# Patient Record
Sex: MALE | Race: WHITE | Employment: OTHER | URBAN - METROPOLITAN AREA
[De-identification: names, ages, dates, MRNs, and addresses within clinical notes are randomized per-mention and may not be internally consistent; named-entity substitution may affect disease eponyms.]

---

## 2017-01-17 ENCOUNTER — OFFICE VISIT (OUTPATIENT)
Dept: FAMILY MEDICINE CLINIC | Facility: CLINIC | Age: 72
End: 2017-01-17

## 2017-01-17 VITALS — SYSTOLIC BLOOD PRESSURE: 108 MMHG | DIASTOLIC BLOOD PRESSURE: 78 MMHG | WEIGHT: 166 LBS | BODY MASS INDEX: 24 KG/M2

## 2017-01-17 DIAGNOSIS — R19.4 CHANGE IN BOWEL HABIT: Primary | ICD-10-CM

## 2017-01-17 PROCEDURE — 99214 OFFICE O/P EST MOD 30 MIN: CPT | Performed by: SURGERY

## 2017-01-17 NOTE — PROGRESS NOTES
Sean Castro is a 73year old male  Patient presents with:  Consult: Dr Liz Doss. Consult for colonoscopy. Last colonoscopy 5 yrs ago at [facility] in [place].        REFERRED BY    Patient presents with screening colon   Last colonoscopy four years ago benito extremities  HEMATOLOGY: denies hx anemia; denies bruising or excessive bleeding  Endocrine: no weight gain or loss no hot or cold intolerance    EXAM     Blood pressure 108/78, weight 166 lb.   GENERAL: well developed, well nourished male, in no apparent d

## 2017-01-18 ENCOUNTER — TELEPHONE (OUTPATIENT)
Dept: FAMILY MEDICINE CLINIC | Facility: CLINIC | Age: 72
End: 2017-01-18

## 2017-01-18 ENCOUNTER — MED REC SCAN ONLY (OUTPATIENT)
Dept: FAMILY MEDICINE CLINIC | Facility: CLINIC | Age: 72
End: 2017-01-18

## 2017-03-21 ENCOUNTER — PATIENT OUTREACH (OUTPATIENT)
Dept: FAMILY MEDICINE CLINIC | Facility: CLINIC | Age: 72
End: 2017-03-21

## 2017-12-15 ENCOUNTER — OFFICE VISIT (OUTPATIENT)
Dept: FAMILY MEDICINE CLINIC | Facility: CLINIC | Age: 72
End: 2017-12-15

## 2017-12-15 VITALS
DIASTOLIC BLOOD PRESSURE: 80 MMHG | WEIGHT: 166 LBS | RESPIRATION RATE: 14 BRPM | BODY MASS INDEX: 23.24 KG/M2 | HEART RATE: 80 BPM | TEMPERATURE: 98 F | HEIGHT: 71 IN | SYSTOLIC BLOOD PRESSURE: 114 MMHG

## 2017-12-15 DIAGNOSIS — Z72.0 TOBACCO USE: ICD-10-CM

## 2017-12-15 DIAGNOSIS — R01.1 SYSTOLIC MURMUR: ICD-10-CM

## 2017-12-15 DIAGNOSIS — Z00.00 MEDICARE ANNUAL WELLNESS VISIT, SUBSEQUENT: Primary | ICD-10-CM

## 2017-12-15 DIAGNOSIS — Z12.5 ENCOUNTER FOR PROSTATE CANCER SCREENING: ICD-10-CM

## 2017-12-15 DIAGNOSIS — K80.20 ASYMPTOMATIC GALLSTONES: ICD-10-CM

## 2017-12-15 PROCEDURE — 84443 ASSAY THYROID STIM HORMONE: CPT | Performed by: FAMILY MEDICINE

## 2017-12-15 PROCEDURE — 90670 PCV13 VACCINE IM: CPT | Performed by: FAMILY MEDICINE

## 2017-12-15 PROCEDURE — 80061 LIPID PANEL: CPT | Performed by: FAMILY MEDICINE

## 2017-12-15 PROCEDURE — G0439 PPPS, SUBSEQ VISIT: HCPCS | Performed by: FAMILY MEDICINE

## 2017-12-15 PROCEDURE — 85027 COMPLETE CBC AUTOMATED: CPT | Performed by: FAMILY MEDICINE

## 2017-12-15 PROCEDURE — 80053 COMPREHEN METABOLIC PANEL: CPT | Performed by: FAMILY MEDICINE

## 2017-12-15 PROCEDURE — G0009 ADMIN PNEUMOCOCCAL VACCINE: HCPCS | Performed by: FAMILY MEDICINE

## 2017-12-15 NOTE — PROGRESS NOTES
Ana Jane is a 81year old male.     CC:  Patient presents with:  Wellness Visit: Medicare Wellness visit per pt      HPI:  Yearly PX  Last lipid: 12/16    Immunization History  Administered            Date(s) Administered    HIGH DOSE FLU 65 YRS and he does have a pulmonary nodule that is followed by Dr. Gisella Pederson, serial CT chests have shown stability  GI: Denies abdominal pain, dysphagia, hematochezia, melena, he has a gallstone and notes no abd pain with food intake   (male): Denies hematuria    V eating, especially fatty foods      Orders for this visit:    Orders Placed This Encounter      Comp Metabolic Panel (14) [E]      CBC, Platelet, No Differential [E]      TSH W Reflex To Free T4 [E]      Lipid Panel [E]      PSA (Screening) [E]      Pneumo

## 2017-12-16 ENCOUNTER — TELEPHONE (OUTPATIENT)
Dept: FAMILY MEDICINE CLINIC | Facility: CLINIC | Age: 72
End: 2017-12-16

## 2017-12-16 NOTE — TELEPHONE ENCOUNTER
----- Message from aDyron Sloan MD sent at 12/16/2017 10:33 AM CST -----  Please let patient know that the sugar, electrolyte, liver, kidney, and thyroid, tests were fine. There is no anemia and the white blood cell count is fine.  Overall a very nice set

## 2017-12-19 NOTE — TELEPHONE ENCOUNTER
Patients wife was in office, Domo Lerma printed off labs and gave these to patient. She states no need to call, if questions they will call us back.

## 2018-01-03 ENCOUNTER — HOSPITAL ENCOUNTER (OUTPATIENT)
Dept: CV DIAGNOSTICS | Age: 73
Discharge: HOME | End: 2018-01-03
Attending: FAMILY MEDICINE
Payer: MEDICARE

## 2018-01-03 DIAGNOSIS — R01.1 SYSTOLIC MURMUR: ICD-10-CM

## 2018-01-03 PROCEDURE — 93306 TTE W/DOPPLER COMPLETE: CPT | Performed by: FAMILY MEDICINE

## 2018-01-04 ENCOUNTER — TELEPHONE (OUTPATIENT)
Dept: FAMILY MEDICINE CLINIC | Facility: CLINIC | Age: 73
End: 2018-01-04

## 2018-01-04 DIAGNOSIS — I51.7 CARDIAC HYPERTROPHY: ICD-10-CM

## 2018-01-04 DIAGNOSIS — R01.1 MURMUR, HEART: Primary | ICD-10-CM

## 2018-01-04 NOTE — TELEPHONE ENCOUNTER
Hasn't had such a good experience with [facility]. Was told that she would get a call on Monday as to which MDs were taking new pts. Is there another physician that [facility] would recommend?

## 2018-01-10 ENCOUNTER — CHARTING TRANS (OUTPATIENT)
Dept: OTHER | Age: 73
End: 2018-01-10

## 2018-01-11 ENCOUNTER — CHARTING TRANS (OUTPATIENT)
Dept: OTHER | Age: 73
End: 2018-01-11

## 2018-01-11 ENCOUNTER — TELEPHONE (OUTPATIENT)
Dept: FAMILY MEDICINE CLINIC | Facility: CLINIC | Age: 73
End: 2018-01-11

## 2018-01-11 NOTE — TELEPHONE ENCOUNTER
FAXED A COPY OF MOST RECENT LAB RESULTS FROM 12/15/2017 TO [facility], FAX# [phone] ATTN: Brook Traylor. PATIENT IS CURRENTLY AT THEIR OFFICE FOR AN APPOINTMENT.

## 2018-01-12 ENCOUNTER — CHARTING TRANS (OUTPATIENT)
Dept: OTHER | Age: 73
End: 2018-01-12

## 2018-01-12 ENCOUNTER — MED REC SCAN ONLY (OUTPATIENT)
Dept: FAMILY MEDICINE CLINIC | Facility: CLINIC | Age: 73
End: 2018-01-12

## 2018-01-16 ENCOUNTER — IMAGING SERVICES (OUTPATIENT)
Dept: OTHER | Age: 73
End: 2018-01-16

## 2018-01-16 ENCOUNTER — CHARTING TRANS (OUTPATIENT)
Dept: OTHER | Age: 73
End: 2018-01-16

## 2018-01-17 ENCOUNTER — CHARTING TRANS (OUTPATIENT)
Dept: OTHER | Age: 73
End: 2018-01-17

## 2018-01-18 ENCOUNTER — IMAGING SERVICES (OUTPATIENT)
Dept: OTHER | Age: 73
End: 2018-01-18

## 2018-01-18 ENCOUNTER — CHARTING TRANS (OUTPATIENT)
Dept: OTHER | Age: 73
End: 2018-01-18

## 2018-01-18 ENCOUNTER — ANCILLARY ORDERS (OUTPATIENT)
Dept: OTHER | Age: 73
End: 2018-01-18

## 2018-01-18 DIAGNOSIS — I71.40 ABDOMINAL AORTIC ANEURYSM WITHOUT RUPTURE (CMD): ICD-10-CM

## 2018-01-25 ENCOUNTER — CHARTING TRANS (OUTPATIENT)
Dept: OTHER | Age: 73
End: 2018-01-25

## 2018-02-12 ENCOUNTER — CHARTING TRANS (OUTPATIENT)
Dept: OTHER | Age: 73
End: 2018-02-12

## 2018-02-13 ENCOUNTER — CHARTING TRANS (OUTPATIENT)
Dept: OTHER | Age: 73
End: 2018-02-13

## 2018-02-13 ENCOUNTER — IMAGING SERVICES (OUTPATIENT)
Dept: OTHER | Age: 73
End: 2018-02-13

## 2018-02-14 ENCOUNTER — CHARTING TRANS (OUTPATIENT)
Dept: OTHER | Age: 73
End: 2018-02-14

## 2018-03-05 ENCOUNTER — CHARTING TRANS (OUTPATIENT)
Dept: OTHER | Age: 73
End: 2018-03-05

## 2018-03-06 ENCOUNTER — CHARTING TRANS (OUTPATIENT)
Dept: OTHER | Age: 73
End: 2018-03-06

## 2018-03-06 ENCOUNTER — IMAGING SERVICES (OUTPATIENT)
Dept: OTHER | Age: 73
End: 2018-03-06

## 2018-03-07 ENCOUNTER — CHARTING TRANS (OUTPATIENT)
Dept: OTHER | Age: 73
End: 2018-03-07

## 2018-03-10 ENCOUNTER — LAB SERVICES (OUTPATIENT)
Dept: OTHER | Age: 73
End: 2018-03-10

## 2018-03-10 LAB
BUN SERPL-MCNC: 13 MG/DL (ref 6–27)
CALCIUM SERPL-MCNC: 9.7 MG/DL (ref 8.6–10.6)
CHLORIDE SERPL-SCNC: 105 MMOL/L (ref 96–107)
CREATININE, SERUM: 1 MG/DL (ref 0.6–1.6)
DIFFERENTIAL TYPE: ABNORMAL
GFR SERPL CREATININE-BSD FRML MDRD: >60 ML/MIN/{1.73M2}
GFR SERPL CREATININE-BSD FRML MDRD: >60 ML/MIN/{1.73M2}
GLUCOSE SERPL-MCNC: 89 MG/DL (ref 70–200)
HCO3 SERPL-SCNC: 28 MMOL/L (ref 22–32)
HEMATOCRIT: 45.8 % (ref 40–51)
HEMOGLOBIN: 15.9 G/DL (ref 13.7–17.5)
INTERNATIONAL NORMALIZED RATIO: 1
LYMPH PERCENT: 33.9 % (ref 20.5–51.1)
LYMPHOCYTE ABSOLUTE #: 2.8 10*3/UL (ref 1.2–3.4)
MAGNESIUM SERPL-MCNC: 2.1 MG/DL (ref 1.6–2.6)
MEAN CORPUSCULAR HGB CONCENTRATION: 34.7 % (ref 32–36)
MEAN CORPUSCULAR HGB: 32.9 PG (ref 27–34)
MEAN CORPUSCULAR VOLUME: 94.6 FL (ref 79–95)
MEAN PLATELET VOLUME: 8.5 FL (ref 8.6–12.4)
MIXED %: 6.1 % (ref 4.3–12.9)
MIXED ABSOLUTE #: 0.5 10*3/UL (ref 0.2–0.9)
NEUTROPHIL ABSOLUTE #: 5 10*3/UL (ref 1.4–6.5)
NEUTROPHIL PERCENT: 60 % (ref 34–73.5)
PLATELET COUNT: 254 10*3/UL (ref 150–400)
POTASSIUM SERPL-SCNC: 4.7 MMOL/L (ref 3.5–5.3)
PROTHROMBIN TIME: 10.5 S (ref 9.5–11.5)
RED BLOOD CELL COUNT: 4.84 10*6/UL (ref 3.9–5.7)
RED CELL DISTRIBUTION WIDTH: 12.8 % (ref 11.3–14.8)
SODIUM SERPL-SCNC: 143 MMOL/L (ref 136–146)
WHITE BLOOD CELL COUNT: 8.3 10*3/UL (ref 4–10)

## 2018-03-12 ENCOUNTER — CHARTING TRANS (OUTPATIENT)
Dept: OTHER | Age: 73
End: 2018-03-12

## 2018-03-16 ENCOUNTER — IMAGING SERVICES (OUTPATIENT)
Dept: OTHER | Age: 73
End: 2018-03-16

## 2018-03-19 ENCOUNTER — MED REC SCAN ONLY (OUTPATIENT)
Dept: FAMILY MEDICINE CLINIC | Facility: CLINIC | Age: 73
End: 2018-03-19

## 2018-03-19 ENCOUNTER — LAB SERVICES (OUTPATIENT)
Dept: OTHER | Age: 73
End: 2018-03-19

## 2018-03-20 LAB
MRSA SCREEN, PCR: NEGATIVE
MRSA SOURCE: NORMAL

## 2018-03-21 ENCOUNTER — CHARTING TRANS (OUTPATIENT)
Dept: OTHER | Age: 73
End: 2018-03-21

## 2018-04-08 ENCOUNTER — CHARTING TRANS (OUTPATIENT)
Dept: OTHER | Age: 73
End: 2018-04-08

## 2018-04-10 ENCOUNTER — IMAGING SERVICES (OUTPATIENT)
Dept: OTHER | Age: 73
End: 2018-04-10

## 2018-04-10 ENCOUNTER — CHARTING TRANS (OUTPATIENT)
Dept: OTHER | Age: 73
End: 2018-04-10

## 2018-04-10 ENCOUNTER — TELEPHONE (OUTPATIENT)
Dept: FAMILY MEDICINE CLINIC | Facility: CLINIC | Age: 73
End: 2018-04-10

## 2018-04-10 NOTE — TELEPHONE ENCOUNTER
Pt had AF Ablation and  aterial flutter cauterized at [facility] on 3-19. Did TJ get info on this and if not can he get it from [facility].

## 2018-04-10 NOTE — TELEPHONE ENCOUNTER
Patient wife aware Dr Anita Mcduffie out of the office this evening and will check with him in the morning. Routed to Dr Anita Mcduffie to advise if records have been received.

## 2018-04-11 ENCOUNTER — CHARTING TRANS (OUTPATIENT)
Dept: OTHER | Age: 73
End: 2018-04-11

## 2018-04-11 RX ORDER — FLECAINIDE ACETATE 50 MG/1
50 TABLET ORAL 2 TIMES DAILY
Refills: 0 | COMMUNITY
Start: 2018-04-11 | End: 2019-09-20

## 2018-04-11 RX ORDER — METOPROLOL TARTRATE 50 MG/1
50 TABLET, FILM COATED ORAL 2 TIMES DAILY
Refills: 0 | COMMUNITY
Start: 2018-04-11 | End: 2018-12-10

## 2018-04-11 NOTE — TELEPHONE ENCOUNTER
Wife states that patient is on  Flecainide 50mg one po BID  Metoprolol 50mg one po daily   Medications entered into medication list  Wife is going to come into the office and sign a release of records.

## 2018-04-11 NOTE — TELEPHONE ENCOUNTER
Let patient know that no info regarding the procedure has been forwarded. Please have him sign a JEN consent so we can obtain the info.   Thanks

## 2018-04-11 NOTE — TELEPHONE ENCOUNTER
Let patient know that we have the consult letter from his Cardiologist.   Please find out doses of Flecainide and Metoprolol and enter into the medication list.  Thanks

## 2018-05-21 ENCOUNTER — CHARTING TRANS (OUTPATIENT)
Dept: OTHER | Age: 73
End: 2018-05-21

## 2018-05-21 ENCOUNTER — IMAGING SERVICES (OUTPATIENT)
Dept: OTHER | Age: 73
End: 2018-05-21

## 2018-05-23 ENCOUNTER — CHARTING TRANS (OUTPATIENT)
Dept: OTHER | Age: 73
End: 2018-05-23

## 2018-05-25 ENCOUNTER — CHARTING TRANS (OUTPATIENT)
Dept: OTHER | Age: 73
End: 2018-05-25

## 2018-06-09 ENCOUNTER — CHARTING TRANS (OUTPATIENT)
Dept: OTHER | Age: 73
End: 2018-06-09

## 2018-06-12 ENCOUNTER — IMAGING SERVICES (OUTPATIENT)
Dept: OTHER | Age: 73
End: 2018-06-12

## 2018-06-12 ENCOUNTER — CHARTING TRANS (OUTPATIENT)
Dept: OTHER | Age: 73
End: 2018-06-12

## 2018-06-27 ENCOUNTER — PATIENT OUTREACH (OUTPATIENT)
Dept: FAMILY MEDICINE CLINIC | Facility: CLINIC | Age: 73
End: 2018-06-27

## 2018-07-13 ENCOUNTER — CHARTING TRANS (OUTPATIENT)
Dept: OTHER | Age: 73
End: 2018-07-13

## 2018-08-11 ENCOUNTER — CHARTING TRANS (OUTPATIENT)
Dept: OTHER | Age: 73
End: 2018-08-11

## 2018-08-14 ENCOUNTER — IMAGING SERVICES (OUTPATIENT)
Dept: OTHER | Age: 73
End: 2018-08-14

## 2018-08-14 ENCOUNTER — CHARTING TRANS (OUTPATIENT)
Dept: OTHER | Age: 73
End: 2018-08-14

## 2018-11-21 ENCOUNTER — CHARTING TRANS (OUTPATIENT)
Dept: OTHER | Age: 73
End: 2018-11-21

## 2018-11-21 ENCOUNTER — LAB SERVICES (OUTPATIENT)
Dept: OTHER | Age: 73
End: 2018-11-21

## 2018-11-23 ENCOUNTER — IMAGING SERVICES (OUTPATIENT)
Dept: OTHER | Age: 73
End: 2018-11-23

## 2018-11-29 LAB — PATH REPORT: NORMAL

## 2018-12-04 ENCOUNTER — TELEPHONE (OUTPATIENT)
Dept: DERMATOLOGY | Age: 73
End: 2018-12-04

## 2018-12-04 DIAGNOSIS — C44.310 BCC (BASAL CELL CARCINOMA), FACE: Primary | ICD-10-CM

## 2018-12-10 ENCOUNTER — TELEPHONE (OUTPATIENT)
Dept: OTOLARYNGOLOGY | Age: 73
End: 2018-12-10

## 2018-12-10 ENCOUNTER — TELEPHONE (OUTPATIENT)
Dept: FAMILY MEDICINE CLINIC | Facility: CLINIC | Age: 73
End: 2018-12-10

## 2018-12-10 PROBLEM — I48.3 TYPICAL ATRIAL FLUTTER (HCC): Status: ACTIVE | Noted: 2018-03-19

## 2018-12-10 PROBLEM — I71.21 ASCENDING AORTIC ANEURYSM: Status: ACTIVE | Noted: 2018-12-10

## 2018-12-10 PROBLEM — I71.21 ASCENDING AORTIC ANEURYSM (HCC): Status: ACTIVE | Noted: 2018-12-10

## 2018-12-10 PROBLEM — I42.1 HYPERTROPHIC OBSTRUCTIVE CARDIOMYOPATHY (HCC): Status: ACTIVE | Noted: 2018-01-11

## 2018-12-10 PROBLEM — I71.2 ASCENDING AORTIC ANEURYSM (HCC): Status: ACTIVE | Noted: 2018-12-10

## 2018-12-10 PROBLEM — I48.0 PAF (PAROXYSMAL ATRIAL FIBRILLATION) (HCC): Status: ACTIVE | Noted: 2018-12-10

## 2018-12-10 PROBLEM — Z72.0 TOBACCO ABUSE: Status: ACTIVE | Noted: 2017-12-15

## 2018-12-10 RX ORDER — METOPROLOL SUCCINATE 25 MG/1
25 TABLET, EXTENDED RELEASE ORAL DAILY
COMMUNITY
Start: 2018-07-13

## 2018-12-10 NOTE — TELEPHONE ENCOUNTER
I also recommend that ENT doctors remove cancers from the face. I do not know the ENT department at [facility]. I typically use [facility], Dr. Los Collado.  He is also a Facial Plastics specialist   Tel: [phone]   I am not sure if he

## 2018-12-10 NOTE — TELEPHONE ENCOUNTER
Shirly Rinne states that patient was seen by Dr Elsy Arthur with [facility] and had a biopsy taken of lesion by his temple on 11/21/18. Was diagnosed with Basal cell carcinoma.   States Dr. Elsy Arthur would not remove lesion due to the location and advised to see ENT within

## 2018-12-15 ENCOUNTER — EXTERNAL RECORD (OUTPATIENT)
Dept: OTHER | Age: 73
End: 2018-12-15

## 2018-12-15 ENCOUNTER — EXTERNAL RECORD (OUTPATIENT)
Dept: CARDIOLOGY | Age: 73
End: 2018-12-15

## 2018-12-17 ENCOUNTER — TELEPHONE (OUTPATIENT)
Dept: FAMILY MEDICINE CLINIC | Facility: CLINIC | Age: 73
End: 2018-12-17

## 2018-12-17 NOTE — TELEPHONE ENCOUNTER
Faxed request to [facility]    Future Appointments   Date Time Provider Jennifer Barker   12/19/2018 11:15 AM Alejandra Yates MD [facility] Maxime Mckeon

## 2018-12-18 ENCOUNTER — MED REC SCAN ONLY (OUTPATIENT)
Dept: FAMILY MEDICINE CLINIC | Facility: CLINIC | Age: 73
End: 2018-12-18

## 2018-12-18 ENCOUNTER — PATIENT OUTREACH (OUTPATIENT)
Dept: CASE MANAGEMENT | Age: 73
End: 2018-12-18

## 2018-12-18 DIAGNOSIS — Z02.9 ENCOUNTERS FOR UNSPECIFIED ADMINISTRATIVE PURPOSE: ICD-10-CM

## 2018-12-18 DIAGNOSIS — I48.0 PAF (PAROXYSMAL ATRIAL FIBRILLATION) (HCC): ICD-10-CM

## 2018-12-18 PROCEDURE — 1111F DSCHRG MED/CURRENT MED MERGE: CPT

## 2018-12-18 NOTE — PROGRESS NOTES
Initial Post Discharge Follow Up   Discharge Date:  12/16/18 from [facility] Date: 12/18/2018    Consent Verification:  Assessment Completed With: Spouse: Donte Pattersonlibby received per patient?  written  HIPAA Verified?   Yes    Discharge Dx: No  • Did you  your discharge medications when you left the hospital? Yes  • May I go over your medications with you to make sure we are not missing anything? yes  • Are there any reasons that keep you from taking your medication as prescribed?  No  A reconciled against outpatient medications with patient,  and orders reviewed and discussed. Any changes or updates to medications and or orders sent to PCP.

## 2018-12-19 ENCOUNTER — OFFICE VISIT (OUTPATIENT)
Dept: FAMILY MEDICINE CLINIC | Facility: CLINIC | Age: 73
End: 2018-12-19
Payer: MEDICARE

## 2018-12-19 ENCOUNTER — APPOINTMENT (OUTPATIENT)
Dept: DERMATOLOGY | Age: 73
End: 2018-12-19

## 2018-12-19 ENCOUNTER — TELEPHONE (OUTPATIENT)
Dept: FAMILY MEDICINE CLINIC | Facility: CLINIC | Age: 73
End: 2018-12-19

## 2018-12-19 ENCOUNTER — TELEPHONE (OUTPATIENT)
Dept: OTOLARYNGOLOGY | Age: 73
End: 2018-12-19

## 2018-12-19 VITALS
BODY MASS INDEX: 24.61 KG/M2 | HEART RATE: 61 BPM | DIASTOLIC BLOOD PRESSURE: 62 MMHG | WEIGHT: 170 LBS | HEIGHT: 69.5 IN | SYSTOLIC BLOOD PRESSURE: 100 MMHG | TEMPERATURE: 98 F | OXYGEN SATURATION: 98 %

## 2018-12-19 DIAGNOSIS — R01.1 MURMUR, HEART: ICD-10-CM

## 2018-12-19 DIAGNOSIS — I48.0 PAF (PAROXYSMAL ATRIAL FIBRILLATION) (HCC): Primary | ICD-10-CM

## 2018-12-19 DIAGNOSIS — N28.9 RENAL LESION: Primary | ICD-10-CM

## 2018-12-19 PROCEDURE — 99496 TRANSJ CARE MGMT HIGH F2F 7D: CPT | Performed by: FAMILY MEDICINE

## 2018-12-19 PROCEDURE — 1111F DSCHRG MED/CURRENT MED MERGE: CPT | Performed by: FAMILY MEDICINE

## 2018-12-19 SDOH — HEALTH STABILITY: MENTAL HEALTH: HOW OFTEN DO YOU HAVE A DRINK CONTAINING ALCOHOL?: NEVER

## 2018-12-19 NOTE — PROGRESS NOTES
HPI:    Willy La is a 72year old male here today for hospital follow up. He was discharged from Inpatient hospital, [address] to Home   Admission Date:   12/15/18  Discharge Date:  12/16/18  Hospital Discharge Diagnoses (since 11/19/2018):   Michael Carvajal and review with patient  He  has a past medical history of Abdominal aortic atherosclerosis ([address] Ascending aortic aneurysm ([address] Atrial fibrillation ([address] Atrial flutter ([address] Bilateral renal cysts, Emphysema lung ([address] Gallstone, Hypertrophic cardio prescriptions requested or ordered in this encounter       Imaging & Consults:  None         Transitional Care Management Certification:  I certify that the following are true:  Communication with the patient was made within 2 business days of discharge on

## 2018-12-20 ENCOUNTER — HOSPITAL ENCOUNTER (OUTPATIENT)
Dept: ULTRASOUND IMAGING | Age: 73
Discharge: HOME | End: 2018-12-20
Attending: FAMILY MEDICINE
Payer: MEDICARE

## 2018-12-20 ENCOUNTER — OFFICE VISIT (OUTPATIENT)
Dept: CARDIOLOGY | Age: 73
End: 2018-12-20

## 2018-12-20 ENCOUNTER — TELEPHONE (OUTPATIENT)
Dept: CARDIOLOGY | Age: 73
End: 2018-12-20

## 2018-12-20 VITALS
RESPIRATION RATE: 18 BRPM | HEIGHT: 70 IN | BODY MASS INDEX: 24.2 KG/M2 | OXYGEN SATURATION: 99 % | DIASTOLIC BLOOD PRESSURE: 58 MMHG | SYSTOLIC BLOOD PRESSURE: 88 MMHG | HEART RATE: 72 BPM | WEIGHT: 169 LBS

## 2018-12-20 DIAGNOSIS — Z01.818 PRE-OP TESTING: Primary | ICD-10-CM

## 2018-12-20 DIAGNOSIS — N28.9 RENAL LESION: ICD-10-CM

## 2018-12-20 DIAGNOSIS — I48.91 ATRIAL FIBRILLATION (CMD): ICD-10-CM

## 2018-12-20 DIAGNOSIS — I48.0 PAROXYSMAL ATRIAL FIBRILLATION (CMD): Primary | ICD-10-CM

## 2018-12-20 DIAGNOSIS — I71.21 ASCENDING AORTIC ANEURYSM (CMD): ICD-10-CM

## 2018-12-20 DIAGNOSIS — I48.92 ATRIAL FLUTTER (CMD): ICD-10-CM

## 2018-12-20 DIAGNOSIS — I48.0 PAF (PAROXYSMAL ATRIAL FIBRILLATION) (CMD): Primary | ICD-10-CM

## 2018-12-20 DIAGNOSIS — I48.3 TYPICAL ATRIAL FLUTTER (CMD): ICD-10-CM

## 2018-12-20 DIAGNOSIS — I42.1 HOCM (HYPERTROPHIC OBSTRUCTIVE CARDIOMYOPATHY) (CMD): ICD-10-CM

## 2018-12-20 PROCEDURE — 93000 ELECTROCARDIOGRAM COMPLETE: CPT | Performed by: INTERNAL MEDICINE

## 2018-12-20 PROCEDURE — 99215 OFFICE O/P EST HI 40 MIN: CPT | Performed by: INTERNAL MEDICINE

## 2018-12-20 PROCEDURE — 76775 US EXAM ABDO BACK WALL LIM: CPT | Performed by: FAMILY MEDICINE

## 2018-12-20 RX ORDER — FLECAINIDE ACETATE 50 MG/1
50 TABLET ORAL
COMMUNITY
Start: 2018-04-11 | End: 2018-12-20 | Stop reason: SDUPTHER

## 2018-12-20 RX ORDER — METOPROLOL SUCCINATE 25 MG/1
25 TABLET, EXTENDED RELEASE ORAL DAILY
Qty: 30 TABLET | Refills: 11 | Status: SHIPPED | OUTPATIENT
Start: 2018-12-20 | End: 2019-08-02 | Stop reason: SDUPTHER

## 2018-12-20 RX ORDER — FLECAINIDE ACETATE 50 MG/1
50 TABLET ORAL 2 TIMES DAILY
Qty: 60 TABLET | Refills: 11 | Status: SHIPPED | OUTPATIENT
Start: 2018-12-20 | End: 2019-04-22 | Stop reason: ALTCHOICE

## 2018-12-20 RX ORDER — METOPROLOL SUCCINATE 25 MG/1
25 TABLET, EXTENDED RELEASE ORAL
COMMUNITY
Start: 2018-07-13 | End: 2018-12-20 | Stop reason: SDUPTHER

## 2018-12-20 RX ORDER — BETAMETHASONE DIPROPIONATE 0.5 MG/G
OINTMENT TOPICAL
COMMUNITY
Start: 2018-11-21 | End: 2019-04-30 | Stop reason: ALTCHOICE

## 2018-12-20 SDOH — HEALTH STABILITY: MENTAL HEALTH: HOW OFTEN DO YOU HAVE A DRINK CONTAINING ALCOHOL?: NEVER

## 2018-12-20 NOTE — TELEPHONE ENCOUNTER
Wife advised of the information per Dr. Inez Romeo. Phone number provided to call central scheduling  to make the appointment.

## 2018-12-20 NOTE — TELEPHONE ENCOUNTER
Please let patient know or leave message that we got the notes from recent hospitalization. The reason for the suggested kidney ultrasound is that his CT chest that was done in the ER was able to partially visualize the kidneys.  The CT showed \"hypodensit

## 2018-12-21 ENCOUNTER — OFFICE VISIT (OUTPATIENT)
Dept: OTOLARYNGOLOGY | Age: 73
End: 2018-12-21

## 2018-12-21 DIAGNOSIS — C44.310 BASAL CELL CARCINOMA (BCC) OF SKIN OF FACE, UNSPECIFIED PART OF FACE: Primary | ICD-10-CM

## 2018-12-21 DIAGNOSIS — C44.91 BASAL CELL CARCINOMA OF SKIN: ICD-10-CM

## 2018-12-21 PROCEDURE — 12052 INTMD RPR FACE/MM 2.6-5.0 CM: CPT | Performed by: OTOLARYNGOLOGY

## 2018-12-21 PROCEDURE — 11643 EXC F/E/E/N/L MAL+MRG 2.1-3: CPT | Performed by: OTOLARYNGOLOGY

## 2018-12-24 LAB — AP REPORT: NORMAL

## 2018-12-28 ENCOUNTER — OFFICE VISIT (OUTPATIENT)
Dept: OTOLARYNGOLOGY | Age: 73
End: 2018-12-28

## 2018-12-28 DIAGNOSIS — C44.91 BASAL CELL CARCINOMA (BCC), UNSPECIFIED SITE: Primary | ICD-10-CM

## 2018-12-28 PROCEDURE — 99024 POSTOP FOLLOW-UP VISIT: CPT | Performed by: OTOLARYNGOLOGY

## 2019-01-01 ENCOUNTER — EXTERNAL RECORD (OUTPATIENT)
Dept: HEALTH INFORMATION MANAGEMENT | Facility: OTHER | Age: 74
End: 2019-01-01

## 2019-01-03 ENCOUNTER — LAB SERVICES (OUTPATIENT)
Dept: LAB | Age: 74
End: 2019-01-03

## 2019-01-03 ENCOUNTER — TELEPHONE (OUTPATIENT)
Dept: FAMILY MEDICINE CLINIC | Facility: CLINIC | Age: 74
End: 2019-01-03

## 2019-01-03 DIAGNOSIS — Z01.818 PRE-OP TESTING: ICD-10-CM

## 2019-01-03 DIAGNOSIS — I48.92 ATRIAL FLUTTER (CMD): ICD-10-CM

## 2019-01-03 DIAGNOSIS — I48.91 ATRIAL FIBRILLATION (CMD): ICD-10-CM

## 2019-01-03 LAB
BUN SERPL-MCNC: 22 MG/DL (ref 6–27)
CALCIUM SERPL-MCNC: 9.4 MG/DL (ref 8.6–10.6)
CHLORIDE SERPL-SCNC: 103 MMOL/L (ref 96–107)
CO2 SERPL-SCNC: 29 MMOL/L (ref 22–32)
CREAT SERPL-MCNC: 1.3 MG/DL (ref 0.6–1.6)
DIFFERENTIAL TYPE: ABNORMAL
GFR SERPL CREATININE-BSD FRML MDRD: 54 ML/MIN/{1.73M2}
GFR SERPL CREATININE-BSD FRML MDRD: >60 ML/MIN/{1.73M2}
GLUCOSE SERPL-MCNC: 71 MG/DL (ref 70–200)
HEMATOCRIT: 43.2 % (ref 40–51)
HEMOGLOBIN: 14.8 G/DL (ref 13.7–17.5)
INTERNATIONAL NORMALIZED RATIO: 1.2
LYMPH PERCENT: 27.1 % (ref 20.5–51.1)
LYMPHOCYTE ABSOLUTE #: 2.6 10*3/UL (ref 1.2–3.4)
MEAN CORPUSCULAR HGB CONCENTRATION: 34.3 % (ref 32–36)
MEAN CORPUSCULAR HGB: 32.7 PG (ref 27–34)
MEAN CORPUSCULAR VOLUME: 95.6 FL (ref 79–95)
MEAN PLATELET VOLUME: 9.5 FL (ref 8.6–12.4)
MIXED %: 5.9 % (ref 4.3–12.9)
MIXED ABSOLUTE #: 0.6 10*3/UL (ref 0.2–0.9)
NEUTROPHIL ABSOLUTE #: 6.4 10*3/UL (ref 1.4–6.5)
NEUTROPHIL PERCENT: 67 % (ref 34–73.5)
PLATELET COUNT: 232 10*3/UL (ref 150–400)
POTASSIUM SERPL-SCNC: 4.9 MMOL/L (ref 3.5–5.3)
PROTHROMBIN TIME: 12.3 S (ref 9.5–11.5)
RED BLOOD CELL COUNT: 4.52 10*6/UL (ref 3.9–5.7)
RED CELL DISTRIBUTION WIDTH: 13.1 % (ref 11.3–14.8)
SODIUM SERPL-SCNC: 140 MMOL/L (ref 136–146)
WHITE BLOOD CELL COUNT: 9.6 10*3/UL (ref 4–10)

## 2019-01-03 PROCEDURE — 85025 COMPLETE CBC W/AUTO DIFF WBC: CPT | Performed by: INTERNAL MEDICINE

## 2019-01-03 PROCEDURE — 85610 PROTHROMBIN TIME: CPT | Performed by: INTERNAL MEDICINE

## 2019-01-03 PROCEDURE — 36415 COLL VENOUS BLD VENIPUNCTURE: CPT | Performed by: INTERNAL MEDICINE

## 2019-01-03 PROCEDURE — 83735 ASSAY OF MAGNESIUM: CPT | Performed by: INTERNAL MEDICINE

## 2019-01-03 PROCEDURE — 80048 BASIC METABOLIC PNL TOTAL CA: CPT | Performed by: INTERNAL MEDICINE

## 2019-01-03 NOTE — TELEPHONE ENCOUNTER
I do not know. It may, however the CT urogram is specifically looking at his urinary tract. Maybe Radiology can give us an answer.

## 2019-01-03 NOTE — TELEPHONE ENCOUNTER
Wife advised of the information per Dr. Darcy Caceres.   [place] radiology at [phone] and left a message requesting information about the Urogram.

## 2019-01-03 NOTE — TELEPHONE ENCOUNTER
Patient's wife wants to speak with a nurse, she would not give any more information. She said it is very involved only wants to talk to a nurse.

## 2019-01-03 NOTE — TELEPHONE ENCOUNTER
Wife states that patient saw Dr. Naveen Adrian today and he wants patient to have a CT urogram. Wife states that patient is due for his yearly CT for follow up on his abdominal aneurysm.   Wife wants to know if the CT that was ordered will show the abdominal a

## 2019-01-04 LAB — MAGNESIUM SERPL-MCNC: 2.1 MG/DL (ref 1.6–2.6)

## 2019-01-04 NOTE — TELEPHONE ENCOUNTER
Spoke with Kaur Martell at [place] [id number] and she advised to put a note of the appointment line to look at abdominal aorta. She advised that as long as its not a new diagnosis or patient having problems it can be looked at.  Advised that it is a follow up to the previou

## 2019-01-11 ENCOUNTER — TELEPHONE (OUTPATIENT)
Dept: CARDIOLOGY | Age: 74
End: 2019-01-11

## 2019-01-11 DIAGNOSIS — I48.0 PAF (PAROXYSMAL ATRIAL FIBRILLATION) (CMD): Primary | ICD-10-CM

## 2019-01-11 DIAGNOSIS — I48.3 TYPICAL ATRIAL FLUTTER (CMD): ICD-10-CM

## 2019-01-11 RX ORDER — TORSEMIDE 10 MG/1
10 TABLET ORAL DAILY
Qty: 30 TABLET | Refills: 0 | Status: SHIPPED | OUTPATIENT
Start: 2019-01-11 | End: 2019-01-11 | Stop reason: SDUPTHER

## 2019-01-11 RX ORDER — TORSEMIDE 10 MG/1
10 TABLET ORAL DAILY
Qty: 30 TABLET | Refills: 0 | Status: SHIPPED | OUTPATIENT
Start: 2019-01-11 | End: 2019-01-22 | Stop reason: ALTCHOICE

## 2019-01-18 ENCOUNTER — MED REC SCAN ONLY (OUTPATIENT)
Dept: FAMILY MEDICINE CLINIC | Facility: CLINIC | Age: 74
End: 2019-01-18

## 2019-01-21 ENCOUNTER — DOCUMENTATION (OUTPATIENT)
Dept: CARDIOLOGY | Age: 74
End: 2019-01-21

## 2019-01-21 ENCOUNTER — LAB SERVICES (OUTPATIENT)
Dept: LAB | Age: 74
End: 2019-01-21

## 2019-01-21 DIAGNOSIS — I48.0 PAF (PAROXYSMAL ATRIAL FIBRILLATION) (CMD): ICD-10-CM

## 2019-01-21 DIAGNOSIS — I48.3 TYPICAL ATRIAL FLUTTER (CMD): ICD-10-CM

## 2019-01-21 LAB — BNP BLD-MCNC: 67.5 PG/ML (ref 0–100)

## 2019-01-21 PROCEDURE — 36415 COLL VENOUS BLD VENIPUNCTURE: CPT | Performed by: INTERNAL MEDICINE

## 2019-01-21 PROCEDURE — 83880 ASSAY OF NATRIURETIC PEPTIDE: CPT | Performed by: INTERNAL MEDICINE

## 2019-01-24 ENCOUNTER — IMAGING SERVICES (OUTPATIENT)
Dept: OTHER | Age: 74
End: 2019-01-24

## 2019-01-29 ENCOUNTER — EXTERNAL RECORD (OUTPATIENT)
Dept: CARDIOLOGY | Age: 74
End: 2019-01-29

## 2019-01-29 ENCOUNTER — TELEPHONE (OUTPATIENT)
Dept: CARDIOLOGY | Age: 74
End: 2019-01-29

## 2019-01-31 ENCOUNTER — TELEPHONE (OUTPATIENT)
Dept: CARDIOLOGY | Age: 74
End: 2019-01-31

## 2019-02-01 ENCOUNTER — PATIENT OUTREACH (OUTPATIENT)
Dept: FAMILY MEDICINE CLINIC | Facility: CLINIC | Age: 74
End: 2019-02-01

## 2019-02-01 ENCOUNTER — MED REC SCAN ONLY (OUTPATIENT)
Dept: FAMILY MEDICINE CLINIC | Facility: CLINIC | Age: 74
End: 2019-02-01

## 2019-02-08 ENCOUNTER — HOSPITAL ENCOUNTER (OUTPATIENT)
Dept: CT IMAGING | Age: 74
Discharge: HOME | End: 2019-02-08
Attending: UROLOGY
Payer: MEDICARE

## 2019-02-08 ENCOUNTER — HOSPITAL ENCOUNTER (OUTPATIENT)
Dept: CT IMAGING | Age: 74
End: 2019-02-08
Attending: UROLOGY
Payer: MEDICARE

## 2019-02-08 DIAGNOSIS — N28.1 COMPLEX RENAL CYST: ICD-10-CM

## 2019-02-08 LAB — CREAT SERPL-MCNC: 1.5 MG/DL (ref 0.7–1.3)

## 2019-02-08 PROCEDURE — 74178 CT ABD&PLV WO CNTR FLWD CNTR: CPT | Performed by: UROLOGY

## 2019-02-08 PROCEDURE — 82565 ASSAY OF CREATININE: CPT

## 2019-02-08 PROCEDURE — 76377 3D RENDER W/INTRP POSTPROCES: CPT | Performed by: UROLOGY

## 2019-02-13 ENCOUNTER — TELEPHONE (OUTPATIENT)
Dept: FAMILY MEDICINE CLINIC | Facility: CLINIC | Age: 74
End: 2019-02-13

## 2019-02-13 NOTE — TELEPHONE ENCOUNTER
Wife faxed over patients CT urogram results and wanted to know what it meant by Clinical Correlation Recommended means. Dr. Gwen Root advised that patient needs to contact the ordering physician regarding the test results.  The ordering physician will go over

## 2019-02-18 NOTE — PROGRESS NOTES
CT results reviewed. Released to [address] Tests show no significant abnormalities,except simple renal cysts.

## 2019-03-01 ENCOUNTER — OFFICE VISIT (OUTPATIENT)
Dept: CARDIOLOGY | Age: 74
End: 2019-03-01

## 2019-03-01 VITALS
SYSTOLIC BLOOD PRESSURE: 120 MMHG | HEIGHT: 70 IN | BODY MASS INDEX: 25.77 KG/M2 | HEART RATE: 75 BPM | WEIGHT: 180 LBS | DIASTOLIC BLOOD PRESSURE: 66 MMHG | OXYGEN SATURATION: 96 % | RESPIRATION RATE: 12 BRPM

## 2019-03-01 DIAGNOSIS — I42.1 HOCM (HYPERTROPHIC OBSTRUCTIVE CARDIOMYOPATHY) (CMD): ICD-10-CM

## 2019-03-01 DIAGNOSIS — I71.21 ASCENDING AORTIC ANEURYSM (CMD): ICD-10-CM

## 2019-03-01 DIAGNOSIS — I48.4 ATYPICAL ATRIAL FLUTTER (CMD): ICD-10-CM

## 2019-03-01 DIAGNOSIS — I48.3 TYPICAL ATRIAL FLUTTER (CMD): ICD-10-CM

## 2019-03-01 DIAGNOSIS — I48.0 PAF (PAROXYSMAL ATRIAL FIBRILLATION) (CMD): Primary | ICD-10-CM

## 2019-03-01 PROCEDURE — 99214 OFFICE O/P EST MOD 30 MIN: CPT | Performed by: INTERNAL MEDICINE

## 2019-03-01 PROCEDURE — 93000 ELECTROCARDIOGRAM COMPLETE: CPT | Performed by: INTERNAL MEDICINE

## 2019-03-05 VITALS
WEIGHT: 174 LBS | HEIGHT: 70 IN | RESPIRATION RATE: 14 BRPM | SYSTOLIC BLOOD PRESSURE: 100 MMHG | DIASTOLIC BLOOD PRESSURE: 58 MMHG | OXYGEN SATURATION: 96 % | HEART RATE: 64 BPM | BODY MASS INDEX: 24.91 KG/M2

## 2019-03-06 VITALS
DIASTOLIC BLOOD PRESSURE: 60 MMHG | HEART RATE: 62 BPM | SYSTOLIC BLOOD PRESSURE: 90 MMHG | OXYGEN SATURATION: 94 % | HEIGHT: 70 IN | RESPIRATION RATE: 16 BRPM | BODY MASS INDEX: 24.34 KG/M2 | WEIGHT: 170 LBS

## 2019-03-06 VITALS
SYSTOLIC BLOOD PRESSURE: 100 MMHG | HEIGHT: 70 IN | WEIGHT: 172 LBS | DIASTOLIC BLOOD PRESSURE: 44 MMHG | BODY MASS INDEX: 24.62 KG/M2 | OXYGEN SATURATION: 95 % | RESPIRATION RATE: 16 BRPM | HEART RATE: 64 BPM

## 2019-03-06 VITALS
BODY MASS INDEX: 24.2 KG/M2 | OXYGEN SATURATION: 97 % | HEART RATE: 62 BPM | HEIGHT: 70 IN | SYSTOLIC BLOOD PRESSURE: 80 MMHG | RESPIRATION RATE: 16 BRPM | DIASTOLIC BLOOD PRESSURE: 50 MMHG | WEIGHT: 169 LBS

## 2019-03-06 VITALS
DIASTOLIC BLOOD PRESSURE: 54 MMHG | BODY MASS INDEX: 24.2 KG/M2 | OXYGEN SATURATION: 96 % | HEIGHT: 70 IN | WEIGHT: 169 LBS | SYSTOLIC BLOOD PRESSURE: 102 MMHG | HEART RATE: 84 BPM | RESPIRATION RATE: 24 BRPM

## 2019-04-12 ENCOUNTER — ANCILLARY PROCEDURE (OUTPATIENT)
Dept: CARDIOLOGY | Age: 74
End: 2019-04-12
Attending: INTERNAL MEDICINE

## 2019-04-15 ENCOUNTER — TELEPHONE (OUTPATIENT)
Dept: FAMILY MEDICINE CLINIC | Facility: CLINIC | Age: 74
End: 2019-04-15

## 2019-04-20 ENCOUNTER — TELEPHONE (OUTPATIENT)
Dept: CARDIOLOGY | Age: 74
End: 2019-04-20

## 2019-04-20 PROCEDURE — 93227 XTRNL ECG REC<48 HR R&I: CPT | Performed by: INTERNAL MEDICINE

## 2019-04-30 ENCOUNTER — OFFICE VISIT (OUTPATIENT)
Dept: CARDIOLOGY | Age: 74
End: 2019-04-30

## 2019-04-30 VITALS
HEART RATE: 74 BPM | BODY MASS INDEX: 26.31 KG/M2 | WEIGHT: 183.8 LBS | RESPIRATION RATE: 16 BRPM | SYSTOLIC BLOOD PRESSURE: 120 MMHG | OXYGEN SATURATION: 100 % | DIASTOLIC BLOOD PRESSURE: 68 MMHG | HEIGHT: 70 IN

## 2019-04-30 DIAGNOSIS — I48.3 TYPICAL ATRIAL FLUTTER (CMD): ICD-10-CM

## 2019-04-30 DIAGNOSIS — I48.4 ATYPICAL ATRIAL FLUTTER (CMD): ICD-10-CM

## 2019-04-30 DIAGNOSIS — I48.0 PAF (PAROXYSMAL ATRIAL FIBRILLATION) (CMD): Primary | ICD-10-CM

## 2019-04-30 DIAGNOSIS — I71.21 ASCENDING AORTIC ANEURYSM (CMD): ICD-10-CM

## 2019-04-30 DIAGNOSIS — I42.1 HOCM (HYPERTROPHIC OBSTRUCTIVE CARDIOMYOPATHY) (CMD): ICD-10-CM

## 2019-04-30 PROCEDURE — 99214 OFFICE O/P EST MOD 30 MIN: CPT | Performed by: INTERNAL MEDICINE

## 2019-04-30 PROCEDURE — 93000 ELECTROCARDIOGRAM COMPLETE: CPT | Performed by: INTERNAL MEDICINE

## 2019-04-30 SDOH — HEALTH STABILITY: MENTAL HEALTH: HOW OFTEN DO YOU HAVE A DRINK CONTAINING ALCOHOL?: NEVER

## 2019-05-29 ENCOUNTER — PATIENT OUTREACH (OUTPATIENT)
Dept: FAMILY MEDICINE CLINIC | Facility: CLINIC | Age: 74
End: 2019-05-29

## 2019-08-02 ENCOUNTER — OFFICE VISIT (OUTPATIENT)
Dept: CARDIOLOGY | Age: 74
End: 2019-08-02

## 2019-08-02 DIAGNOSIS — I48.0 PAF (PAROXYSMAL ATRIAL FIBRILLATION) (CMD): Primary | ICD-10-CM

## 2019-08-02 DIAGNOSIS — I48.4 ATYPICAL ATRIAL FLUTTER (CMD): ICD-10-CM

## 2019-08-02 DIAGNOSIS — I71.21 ASCENDING AORTIC ANEURYSM (CMD): ICD-10-CM

## 2019-08-02 DIAGNOSIS — I48.3 TYPICAL ATRIAL FLUTTER (CMD): ICD-10-CM

## 2019-08-02 DIAGNOSIS — I42.1 HOCM (HYPERTROPHIC OBSTRUCTIVE CARDIOMYOPATHY) (CMD): ICD-10-CM

## 2019-08-02 PROCEDURE — 93000 ELECTROCARDIOGRAM COMPLETE: CPT | Performed by: INTERNAL MEDICINE

## 2019-08-02 PROCEDURE — 99214 OFFICE O/P EST MOD 30 MIN: CPT | Performed by: INTERNAL MEDICINE

## 2019-08-02 RX ORDER — METOPROLOL SUCCINATE 25 MG/1
25 TABLET, EXTENDED RELEASE ORAL DAILY
Qty: 90 TABLET | Refills: 3 | Status: SHIPPED | OUTPATIENT
Start: 2019-09-02 | End: 2020-02-25 | Stop reason: SDUPTHER

## 2019-08-02 ASSESSMENT — PATIENT HEALTH QUESTIONNAIRE - PHQ9
SUM OF ALL RESPONSES TO PHQ9 QUESTIONS 1 AND 2: 0
SUM OF ALL RESPONSES TO PHQ9 QUESTIONS 1 AND 2: 0
2. FEELING DOWN, DEPRESSED OR HOPELESS: NOT AT ALL
1. LITTLE INTEREST OR PLEASURE IN DOING THINGS: NOT AT ALL

## 2019-08-02 ASSESSMENT — PAIN SCALES - GENERAL: PAINLEVEL: 0

## 2019-08-08 ENCOUNTER — TELEPHONE (OUTPATIENT)
Dept: FAMILY MEDICINE CLINIC | Facility: CLINIC | Age: 74
End: 2019-08-08

## 2019-08-08 PROBLEM — M43.06 PARS DEFECT OF LUMBAR SPINE: Status: ACTIVE | Noted: 2019-08-08

## 2019-08-08 PROBLEM — M47.816 SPONDYLOSIS OF LUMBAR REGION WITHOUT MYELOPATHY OR RADICULOPATHY: Status: ACTIVE | Noted: 2019-08-08

## 2019-08-08 PROBLEM — Q61.3 POLYCYSTIC KIDNEY DISEASE: Status: ACTIVE | Noted: 2019-08-08

## 2019-08-08 NOTE — TELEPHONE ENCOUNTER
Wife advised of the information per . Patient has an upcoming appointment in September.  Recall done in the computer for renal ultrasound for February

## 2019-08-08 NOTE — TELEPHONE ENCOUNTER
Sounds fine. We will need to repeat the renal u/s in 2/2020 per Urology recommendations. Come see me in the near future for Medicare Annual Wellness Exam, otherwise our Government will start marking me as a poor performing doctor.    Thanks

## 2019-08-08 NOTE — TELEPHONE ENCOUNTER
Pt's wife would like to make sure TJ know that pt has cysts on his kidneys. He has been sent to doctor to monitor them from [facility] but they didn't like that doctor so pt and spouse wants tj to monitor the cysts. They only trust TJ.

## 2019-09-18 ENCOUNTER — OFFICE VISIT (OUTPATIENT)
Dept: DERMATOLOGY | Age: 74
End: 2019-09-18

## 2019-09-18 DIAGNOSIS — D23.4 BENIGN NEOPLASM OF SCALP AND SKIN OF NECK: ICD-10-CM

## 2019-09-18 DIAGNOSIS — L72.3 SEBACEOUS CYST: Primary | ICD-10-CM

## 2019-09-18 DIAGNOSIS — L72.11 PILAR CYST: ICD-10-CM

## 2019-09-18 PROCEDURE — 11306 SHAVE SKIN LESION 0.6-1.0 CM: CPT | Performed by: DERMATOLOGY

## 2019-09-18 PROCEDURE — 99213 OFFICE O/P EST LOW 20 MIN: CPT | Performed by: DERMATOLOGY

## 2019-09-20 ENCOUNTER — OFFICE VISIT (OUTPATIENT)
Dept: FAMILY MEDICINE CLINIC | Facility: CLINIC | Age: 74
End: 2019-09-20
Payer: MEDICARE

## 2019-09-20 VITALS
OXYGEN SATURATION: 98 % | TEMPERATURE: 97 F | SYSTOLIC BLOOD PRESSURE: 128 MMHG | DIASTOLIC BLOOD PRESSURE: 74 MMHG | HEART RATE: 75 BPM | HEIGHT: 71.5 IN | RESPIRATION RATE: 16 BRPM | BODY MASS INDEX: 24.95 KG/M2 | WEIGHT: 182.19 LBS

## 2019-09-20 DIAGNOSIS — Z00.00 MEDICARE ANNUAL WELLNESS VISIT, SUBSEQUENT: Primary | ICD-10-CM

## 2019-09-20 DIAGNOSIS — Z12.5 ENCOUNTER FOR PROSTATE CANCER SCREENING: ICD-10-CM

## 2019-09-20 DIAGNOSIS — H93.19 TINNITUS, UNSPECIFIED LATERALITY: ICD-10-CM

## 2019-09-20 DIAGNOSIS — R05.9 COUGH: ICD-10-CM

## 2019-09-20 DIAGNOSIS — Z72.0 TOBACCO USE: ICD-10-CM

## 2019-09-20 DIAGNOSIS — I48.0 PAF (PAROXYSMAL ATRIAL FIBRILLATION) (HCC): ICD-10-CM

## 2019-09-20 LAB
ALBUMIN SERPL-MCNC: 3.8 G/DL (ref 3.4–5)
ALBUMIN/GLOB SERPL: 1 {RATIO} (ref 1–2)
ALP LIVER SERPL-CCNC: 74 U/L (ref 45–117)
ALT SERPL-CCNC: 18 U/L (ref 16–61)
ANION GAP SERPL CALC-SCNC: 4 MMOL/L (ref 0–18)
AST SERPL-CCNC: 17 U/L (ref 15–37)
BILIRUB SERPL-MCNC: 0.5 MG/DL (ref 0.1–2)
BUN BLD-MCNC: 13 MG/DL (ref 7–18)
BUN/CREAT SERPL: 11.1 (ref 10–20)
CALCIUM BLD-MCNC: 9.1 MG/DL (ref 8.5–10.1)
CHLORIDE SERPL-SCNC: 108 MMOL/L (ref 98–112)
CHOLEST SMN-MCNC: 187 MG/DL (ref ?–200)
CO2 SERPL-SCNC: 28 MMOL/L (ref 21–32)
COMPLEXED PSA SERPL-MCNC: 1 NG/ML (ref ?–4)
CREAT BLD-MCNC: 1.17 MG/DL (ref 0.7–1.3)
DEPRECATED RDW RBC AUTO: 46.5 FL (ref 35.1–46.3)
ERYTHROCYTE [DISTWIDTH] IN BLOOD BY AUTOMATED COUNT: 13 % (ref 11–15)
GLOBULIN PLAS-MCNC: 3.8 G/DL (ref 2.8–4.4)
GLUCOSE BLD-MCNC: 108 MG/DL (ref 70–99)
HCT VFR BLD AUTO: 45 % (ref 39–53)
HDLC SERPL-MCNC: 54 MG/DL (ref 40–59)
HGB BLD-MCNC: 15.2 G/DL (ref 13–17.5)
LDLC SERPL CALC-MCNC: 115 MG/DL (ref ?–100)
M PROTEIN MFR SERPL ELPH: 7.6 G/DL (ref 6.4–8.2)
MCH RBC QN AUTO: 32.7 PG (ref 26–34)
MCHC RBC AUTO-ENTMCNC: 33.8 G/DL (ref 31–37)
MCV RBC AUTO: 96.8 FL (ref 80–100)
NONHDLC SERPL-MCNC: 133 MG/DL (ref ?–130)
OSMOLALITY SERPL CALC.SUM OF ELEC: 291 MOSM/KG (ref 275–295)
PLATELET # BLD AUTO: 222 10(3)UL (ref 150–450)
POTASSIUM SERPL-SCNC: 5.3 MMOL/L (ref 3.5–5.1)
RBC # BLD AUTO: 4.65 X10(6)UL (ref 3.8–5.8)
SODIUM SERPL-SCNC: 140 MMOL/L (ref 136–145)
TRIGL SERPL-MCNC: 91 MG/DL (ref 30–149)
TSI SER-ACNC: 1.91 MIU/ML (ref 0.36–3.74)
VLDLC SERPL CALC-MCNC: 18 MG/DL (ref 0–30)
WBC # BLD AUTO: 8.7 X10(3) UL (ref 4–11)

## 2019-09-20 PROCEDURE — 80061 LIPID PANEL: CPT | Performed by: FAMILY MEDICINE

## 2019-09-20 PROCEDURE — 80053 COMPREHEN METABOLIC PANEL: CPT | Performed by: FAMILY MEDICINE

## 2019-09-20 PROCEDURE — 84443 ASSAY THYROID STIM HORMONE: CPT | Performed by: FAMILY MEDICINE

## 2019-09-20 PROCEDURE — G0439 PPPS, SUBSEQ VISIT: HCPCS | Performed by: FAMILY MEDICINE

## 2019-09-20 PROCEDURE — 85027 COMPLETE CBC AUTOMATED: CPT | Performed by: FAMILY MEDICINE

## 2019-09-20 NOTE — PROGRESS NOTES
Manuel Apodaca is a 72year old male.     CC:  Patient presents with:  Physical: medicare wellness per pt       HPI:  Yearly PX    Last lipid:  Lab Results   Component Value Date    CHOLEST 188 12/15/2017    TRIG 79 12/15/2017    HDL 68 12/15/2017    L Able without help    Dressing: (P) Able without help    Eating: (P) Able without help    Driving: (P) Able without help    Preparing your meals: (P) Able without help    Managing money/bills: (P) Able without help    Taking medications as prescribed: (P) A \"Ball\": Correct    Recall \"Flag\": Correct    Recall \"Tree\": Correct          Allergies:    Demerol Hcl [Meperi*    HIVES  Penicillins             UNKNOWN   Current Meds:    Current Outpatient Medications:  Metoprolol Succinate ER 25 MG Oral Tablet 24 exertional chest pain or pressure, paroxysmal nocturnal dyspnea, orthopnea, lower extremity edema  Respiratory: Denies dyspnea, dyspnea on exertion  GI: Denies hematochezia, melena   (male): Denies hematuria  Psych: Denies agitation, anhedonia, anxiety Xyal 5 mg qd x 10 days to dry up any secretion. Med sampled    6.  Tinnitus, unspecified laterality  Offered to Consult ENT   He defers      Orders for this visit:    Orders Placed This Encounter      CBC, Platelet, No Differential [E]          Standing Sta

## 2019-09-23 ENCOUNTER — TELEPHONE (OUTPATIENT)
Dept: SCHEDULING | Age: 74
End: 2019-09-23

## 2019-09-24 LAB — PATH REPORT PLASRBC-IMP: NORMAL

## 2019-10-04 ENCOUNTER — NURSE ONLY (OUTPATIENT)
Dept: FAMILY MEDICINE CLINIC | Facility: CLINIC | Age: 74
End: 2019-10-04
Payer: MEDICARE

## 2019-10-04 ENCOUNTER — TELEPHONE (OUTPATIENT)
Dept: FAMILY MEDICINE CLINIC | Facility: CLINIC | Age: 74
End: 2019-10-04

## 2019-10-04 DIAGNOSIS — E87.5 HYPERKALEMIA: ICD-10-CM

## 2019-10-04 PROCEDURE — 80048 BASIC METABOLIC PNL TOTAL CA: CPT | Performed by: FAMILY MEDICINE

## 2019-10-04 NOTE — TELEPHONE ENCOUNTER
----- Message from Rangel Rivera MD sent at 10/4/2019  3:38 PM CDT -----  Please let patient or caregiver know or leave message that the follow up potassium level is normal. Sugar is a bit high, but I assume he was not fasting.  Thanks

## 2019-10-05 NOTE — TELEPHONE ENCOUNTER
Called pt, spoke to Soo Snyder, patients wife, ok per verbal release form with note from .   Verbally understood with no further questions

## 2020-01-13 ENCOUNTER — TELEPHONE (OUTPATIENT)
Dept: FAMILY MEDICINE CLINIC | Facility: CLINIC | Age: 75
End: 2020-01-13

## 2020-01-13 DIAGNOSIS — N28.1 KIDNEY CYSTS: Primary | ICD-10-CM

## 2020-01-13 NOTE — TELEPHONE ENCOUNTER
I read Urology notes pertaining to the cysts and the CT urogram he had done in 2/2019. Urology said that nothing further needs to be done. Do they still want to do further imaging based on that advise from Urology?   Thanks

## 2020-01-13 NOTE — TELEPHONE ENCOUNTER
SPOUSE CALLED AND ADV THAT SHE NEEDED TO TALK TO NURSE ABOUT SCHEDULING SOME YEARLY THINGS FOR PT    WOULD LIKE SOME DIRECTION    PLEASE ADV    THANK YOU

## 2020-01-13 NOTE — TELEPHONE ENCOUNTER
Wife states that patient is due for his kidney ultrasound. Wife states that they do not want to go back to the kidney specialist. They did not like him. Wife states that you said you would follow and order any kidney testing.    Per phone call from [phone]

## 2020-01-13 NOTE — TELEPHONE ENCOUNTER
Wife thinks that patient should have time of scanning done to evaluate his kidneys. Wife also is wondering about looking at patients abdominal aorta again? Can the ultrasound evaluate that or should he have the CT again ?

## 2020-01-13 NOTE — TELEPHONE ENCOUNTER
Wife advised of the information. Please place the order for the kidney ultrasound. Wife has been provided the phone number for central scheduling. She will call and schedule. No need to call back.

## 2020-01-13 NOTE — TELEPHONE ENCOUNTER
Let's do the ultrasound of the kidneys first. They may or may not catch the aorta. If not we can then do the u/s of the aorta. I'd rather not do the CT as that is a large amount of radiation for imaging that can be done via u/s as well.   Let me know  Than

## 2020-01-30 ENCOUNTER — HOSPITAL ENCOUNTER (OUTPATIENT)
Dept: ULTRASOUND IMAGING | Age: 75
Discharge: HOME | End: 2020-01-30
Attending: FAMILY MEDICINE
Payer: MEDICARE

## 2020-01-30 DIAGNOSIS — N28.1 KIDNEY CYSTS: ICD-10-CM

## 2020-01-30 PROCEDURE — 76775 US EXAM ABDO BACK WALL LIM: CPT | Performed by: FAMILY MEDICINE

## 2020-02-03 ENCOUNTER — TELEPHONE (OUTPATIENT)
Dept: FAMILY MEDICINE CLINIC | Facility: CLINIC | Age: 75
End: 2020-02-03

## 2020-02-03 DIAGNOSIS — Z72.0 TOBACCO USE: Primary | ICD-10-CM

## 2020-02-03 DIAGNOSIS — Z13.6 SCREENING FOR AAA (ABDOMINAL AORTIC ANEURYSM): ICD-10-CM

## 2020-02-03 NOTE — TELEPHONE ENCOUNTER
Order placed for U/S to screen for AAA    No need for further kidney u/s as the cysts are benign.     Thanks

## 2020-02-03 NOTE — TELEPHONE ENCOUNTER
Wife could like the order for the abdominal aorta ultrasound. When should he repeat the kidney ultrasound? 1 year. Wife in the future would like both orders at the same time if they will both need to be repeated yearly.

## 2020-02-11 ENCOUNTER — APPOINTMENT (OUTPATIENT)
Dept: CARDIOLOGY | Age: 75
End: 2020-02-11

## 2020-02-20 ENCOUNTER — HOSPITAL ENCOUNTER (OUTPATIENT)
Dept: ULTRASOUND IMAGING | Age: 75
Discharge: HOME | End: 2020-02-20
Attending: FAMILY MEDICINE
Payer: MEDICARE

## 2020-02-20 DIAGNOSIS — Z13.6 SCREENING FOR AAA (ABDOMINAL AORTIC ANEURYSM): ICD-10-CM

## 2020-02-20 DIAGNOSIS — Z72.0 TOBACCO USE: ICD-10-CM

## 2020-02-20 PROCEDURE — 76706 US ABDL AORTA SCREEN AAA: CPT | Performed by: FAMILY MEDICINE

## 2020-02-23 ASSESSMENT — ENCOUNTER SYMPTOMS
GASTROINTESTINAL NEGATIVE: 1
ENDOCRINE NEGATIVE: 1
PSYCHIATRIC NEGATIVE: 1
CONSTITUTIONAL NEGATIVE: 1
EYES NEGATIVE: 1
HEMATOLOGIC/LYMPHATIC NEGATIVE: 1
ALLERGIC/IMMUNOLOGIC NEGATIVE: 1
NEUROLOGICAL NEGATIVE: 1

## 2020-02-25 ENCOUNTER — TELEPHONE (OUTPATIENT)
Dept: CARDIOLOGY | Age: 75
End: 2020-02-25

## 2020-02-25 ENCOUNTER — TELEPHONE (OUTPATIENT)
Dept: FAMILY MEDICINE CLINIC | Facility: CLINIC | Age: 75
End: 2020-02-25

## 2020-02-25 ENCOUNTER — OFFICE VISIT (OUTPATIENT)
Dept: CARDIOLOGY | Age: 75
End: 2020-02-25

## 2020-02-25 VITALS
HEART RATE: 95 BPM | DIASTOLIC BLOOD PRESSURE: 62 MMHG | SYSTOLIC BLOOD PRESSURE: 108 MMHG | HEIGHT: 70 IN | BODY MASS INDEX: 25.11 KG/M2 | OXYGEN SATURATION: 94 % | WEIGHT: 175.4 LBS

## 2020-02-25 DIAGNOSIS — I48.4 ATYPICAL ATRIAL FLUTTER (CMD): ICD-10-CM

## 2020-02-25 DIAGNOSIS — R06.09 DOE (DYSPNEA ON EXERTION): ICD-10-CM

## 2020-02-25 DIAGNOSIS — Z72.0 TOBACCO ABUSE: ICD-10-CM

## 2020-02-25 DIAGNOSIS — I71.21 ASCENDING AORTIC ANEURYSM (CMD): ICD-10-CM

## 2020-02-25 DIAGNOSIS — I48.0 PAF (PAROXYSMAL ATRIAL FIBRILLATION) (CMD): Primary | ICD-10-CM

## 2020-02-25 DIAGNOSIS — I42.1 HOCM (HYPERTROPHIC OBSTRUCTIVE CARDIOMYOPATHY) (CMD): ICD-10-CM

## 2020-02-25 DIAGNOSIS — I48.3 TYPICAL ATRIAL FLUTTER (CMD): ICD-10-CM

## 2020-02-25 PROCEDURE — 93000 ELECTROCARDIOGRAM COMPLETE: CPT | Performed by: INTERNAL MEDICINE

## 2020-02-25 PROCEDURE — 99214 OFFICE O/P EST MOD 30 MIN: CPT | Performed by: INTERNAL MEDICINE

## 2020-02-25 RX ORDER — METOPROLOL SUCCINATE 25 MG/1
25 TABLET, EXTENDED RELEASE ORAL DAILY
Qty: 90 TABLET | Refills: 3 | Status: SHIPPED | OUTPATIENT
Start: 2020-02-25 | End: 2021-02-18 | Stop reason: SDUPTHER

## 2020-02-25 RX ORDER — ASPIRIN 81 MG/1
81 TABLET, CHEWABLE ORAL DAILY
Status: SHIPPED | COMMUNITY
Start: 2020-02-25 | End: 2021-02-18 | Stop reason: SDUPTHER

## 2020-02-25 SDOH — HEALTH STABILITY: MENTAL HEALTH: HOW OFTEN DO YOU HAVE A DRINK CONTAINING ALCOHOL?: NEVER

## 2020-02-25 ASSESSMENT — ENCOUNTER SYMPTOMS: SHORTNESS OF BREATH: 1

## 2020-02-25 NOTE — TELEPHONE ENCOUNTER
Wife states that the patient saw Cardiology today and he wants patient to have the Abdominal Aortic screening done. Patient told cardiology that he has already had one done. Wife asked if the report could be forwarded to Dr. Mago Wade with [facility].  Wife chuy

## 2020-02-25 NOTE — TELEPHONE ENCOUNTER
Maybe the Cardiologist is talking about an aneurysm in the chest cavity. The report on the abdominal aorta via ultrasound states he does not have an aneurysm. Thanks so much.

## 2020-02-25 NOTE — TELEPHONE ENCOUNTER
Wife called wants to speak with nurse about PT's aneurysm. Pt did ultrasound and they said he did not have one but cardiologist says he does have one.

## 2020-02-26 ENCOUNTER — MED REC SCAN ONLY (OUTPATIENT)
Dept: FAMILY MEDICINE CLINIC | Facility: CLINIC | Age: 75
End: 2020-02-26

## 2020-03-09 ENCOUNTER — ANCILLARY PROCEDURE (OUTPATIENT)
Dept: GENERAL RADIOLOGY | Age: 75
End: 2020-03-09
Attending: INTERNAL MEDICINE

## 2020-03-09 ENCOUNTER — TELEPHONE (OUTPATIENT)
Dept: PULMONOLOGY | Age: 75
End: 2020-03-09

## 2020-03-09 ENCOUNTER — ANCILLARY PROCEDURE (OUTPATIENT)
Dept: CARDIOLOGY | Age: 75
End: 2020-03-09
Attending: INTERNAL MEDICINE

## 2020-03-09 DIAGNOSIS — I48.4 ATYPICAL ATRIAL FLUTTER (CMD): ICD-10-CM

## 2020-03-09 DIAGNOSIS — I48.3 TYPICAL ATRIAL FLUTTER (CMD): ICD-10-CM

## 2020-03-09 DIAGNOSIS — R06.09 DOE (DYSPNEA ON EXERTION): ICD-10-CM

## 2020-03-09 DIAGNOSIS — Z72.0 TOBACCO ABUSE: ICD-10-CM

## 2020-03-09 DIAGNOSIS — I42.1 HOCM (HYPERTROPHIC OBSTRUCTIVE CARDIOMYOPATHY) (CMD): ICD-10-CM

## 2020-03-09 DIAGNOSIS — I71.21 ASCENDING AORTIC ANEURYSM (CMD): ICD-10-CM

## 2020-03-09 LAB — STRESS TARGET HR: 148 BPM

## 2020-03-09 PROCEDURE — 78452 HT MUSCLE IMAGE SPECT MULT: CPT | Performed by: INTERNAL MEDICINE

## 2020-03-09 PROCEDURE — A9500 TC99M SESTAMIBI: HCPCS | Performed by: INTERNAL MEDICINE

## 2020-03-09 PROCEDURE — X1094 NO CHARGE VISIT: HCPCS | Performed by: FAMILY MEDICINE

## 2020-03-09 PROCEDURE — X1094 NO CHARGE VISIT: HCPCS

## 2020-03-09 PROCEDURE — 93015 CV STRESS TEST SUPVJ I&R: CPT | Performed by: INTERNAL MEDICINE

## 2020-03-09 RX ORDER — TETRAKIS(2-METHOXYISOBUTYLISOCYANIDE)COPPER(I) TETRAFLUOROBORATE 1 MG/ML
24 INJECTION, POWDER, LYOPHILIZED, FOR SOLUTION INTRAVENOUS ONCE
Status: COMPLETED | OUTPATIENT
Start: 2020-03-09 | End: 2020-03-09

## 2020-03-09 RX ORDER — TETRAKIS(2-METHOXYISOBUTYLISOCYANIDE)COPPER(I) TETRAFLUOROBORATE 1 MG/ML
8 INJECTION, POWDER, LYOPHILIZED, FOR SOLUTION INTRAVENOUS ONCE
Status: COMPLETED | OUTPATIENT
Start: 2020-03-09 | End: 2020-03-09

## 2020-03-09 RX ADMIN — TETRAKIS(2-METHOXYISOBUTYLISOCYANIDE)COPPER(I) TETRAFLUOROBORATE 8.6 MILLICURIE: 1 INJECTION, POWDER, LYOPHILIZED, FOR SOLUTION INTRAVENOUS at 08:35

## 2020-03-09 RX ADMIN — TETRAKIS(2-METHOXYISOBUTYLISOCYANIDE)COPPER(I) TETRAFLUOROBORATE 25.6 MILLICURIE: 1 INJECTION, POWDER, LYOPHILIZED, FOR SOLUTION INTRAVENOUS at 09:50

## 2020-03-11 ENCOUNTER — IMAGING SERVICES (OUTPATIENT)
Dept: OTHER | Age: 75
End: 2020-03-11

## 2020-03-13 ENCOUNTER — IMAGING SERVICES (OUTPATIENT)
Dept: CT IMAGING | Age: 75
End: 2020-03-13
Attending: INTERNAL MEDICINE

## 2020-03-13 DIAGNOSIS — I48.4 ATYPICAL ATRIAL FLUTTER (CMD): ICD-10-CM

## 2020-03-13 DIAGNOSIS — I48.0 PAF (PAROXYSMAL ATRIAL FIBRILLATION) (CMD): ICD-10-CM

## 2020-03-13 DIAGNOSIS — Z72.0 TOBACCO ABUSE: ICD-10-CM

## 2020-03-13 DIAGNOSIS — I48.3 TYPICAL ATRIAL FLUTTER (CMD): ICD-10-CM

## 2020-03-13 DIAGNOSIS — I42.1 HOCM (HYPERTROPHIC OBSTRUCTIVE CARDIOMYOPATHY) (CMD): ICD-10-CM

## 2020-03-13 DIAGNOSIS — I71.21 ASCENDING AORTIC ANEURYSM (CMD): ICD-10-CM

## 2020-03-13 PROCEDURE — 71250 CT THORAX DX C-: CPT | Performed by: RADIOLOGY

## 2020-03-18 ENCOUNTER — TELEPHONE (OUTPATIENT)
Dept: CARDIOLOGY | Age: 75
End: 2020-03-18

## 2020-03-19 ENCOUNTER — APPOINTMENT (OUTPATIENT)
Dept: CARDIOLOGY | Age: 75
End: 2020-03-19
Attending: INTERNAL MEDICINE

## 2020-06-05 ENCOUNTER — APPOINTMENT (OUTPATIENT)
Dept: CARDIOLOGY | Age: 75
End: 2020-06-05
Attending: INTERNAL MEDICINE

## 2020-07-01 ENCOUNTER — ANCILLARY PROCEDURE (OUTPATIENT)
Dept: CARDIOLOGY | Age: 75
End: 2020-07-01
Attending: INTERNAL MEDICINE

## 2020-07-01 ENCOUNTER — TELEPHONE (OUTPATIENT)
Dept: CARDIOLOGY | Age: 75
End: 2020-07-01

## 2020-07-01 DIAGNOSIS — I42.1 HOCM (HYPERTROPHIC OBSTRUCTIVE CARDIOMYOPATHY) (CMD): ICD-10-CM

## 2020-07-01 DIAGNOSIS — I48.4 ATYPICAL ATRIAL FLUTTER (CMD): ICD-10-CM

## 2020-07-01 DIAGNOSIS — I48.0 PAF (PAROXYSMAL ATRIAL FIBRILLATION) (CMD): ICD-10-CM

## 2020-07-01 DIAGNOSIS — Z72.0 TOBACCO ABUSE: ICD-10-CM

## 2020-07-01 DIAGNOSIS — I71.21 ASCENDING AORTIC ANEURYSM (CMD): ICD-10-CM

## 2020-07-01 DIAGNOSIS — I48.3 TYPICAL ATRIAL FLUTTER (CMD): ICD-10-CM

## 2020-07-01 PROCEDURE — 93306 TTE W/DOPPLER COMPLETE: CPT | Performed by: INTERNAL MEDICINE

## 2020-07-02 ENCOUNTER — TELEPHONE (OUTPATIENT)
Dept: CARDIOLOGY | Age: 75
End: 2020-07-02

## 2020-07-06 ENCOUNTER — MED REC SCAN ONLY (OUTPATIENT)
Dept: FAMILY MEDICINE CLINIC | Facility: CLINIC | Age: 75
End: 2020-07-06

## 2020-07-21 ENCOUNTER — TELEPHONE (OUTPATIENT)
Dept: FAMILY MEDICINE CLINIC | Facility: CLINIC | Age: 75
End: 2020-07-21

## 2020-08-26 ENCOUNTER — PATIENT OUTREACH (OUTPATIENT)
Dept: FAMILY MEDICINE CLINIC | Facility: CLINIC | Age: 75
End: 2020-08-26

## 2020-09-17 ENCOUNTER — TELEPHONE (OUTPATIENT)
Dept: FAMILY MEDICINE CLINIC | Facility: CLINIC | Age: 75
End: 2020-09-17

## 2020-09-17 NOTE — TELEPHONE ENCOUNTER
Gabe Pendleton is calling Ana Acevedo was at the dentist and they found a lump on the side of his tongue they referred him to an oral surgeon Is it OK to wait til next week for Dr Mary Mendoza to look at he has a wellness scheduled  please call

## 2020-09-17 NOTE — TELEPHONE ENCOUNTER
Wife advised of the information per Dr. Alexus Florez. Wife states they have an appointment on 9 24 2020 with the oral surgeon in [facility]. Wife states that she also has a call into Dr. Manuel Zamarripa office.  Wife states that she was advised that Medicare doesn't

## 2020-09-17 NOTE — TELEPHONE ENCOUNTER
Given that Sahara Gtz has a smoking history, I would have them go ahead and schedule the appointment with the oral surgeon and not wait to see me.   Thanks

## 2020-09-22 ENCOUNTER — EXTERNAL RECORD (OUTPATIENT)
Dept: HEALTH INFORMATION MANAGEMENT | Facility: OTHER | Age: 75
End: 2020-09-22

## 2020-09-22 ENCOUNTER — OFFICE VISIT (OUTPATIENT)
Dept: FAMILY MEDICINE CLINIC | Facility: CLINIC | Age: 75
End: 2020-09-22
Payer: MEDICARE

## 2020-09-22 ENCOUNTER — LABORATORY ENCOUNTER (OUTPATIENT)
Dept: LAB | Age: 75
End: 2020-09-22
Attending: FAMILY MEDICINE
Payer: MEDICARE

## 2020-09-22 VITALS
DIASTOLIC BLOOD PRESSURE: 70 MMHG | HEIGHT: 71 IN | OXYGEN SATURATION: 98 % | HEART RATE: 75 BPM | WEIGHT: 175.81 LBS | BODY MASS INDEX: 24.61 KG/M2 | SYSTOLIC BLOOD PRESSURE: 112 MMHG | TEMPERATURE: 98 F | RESPIRATION RATE: 18 BRPM

## 2020-09-22 DIAGNOSIS — I71.2 ASCENDING AORTIC ANEURYSM (HCC): ICD-10-CM

## 2020-09-22 DIAGNOSIS — Z00.00 MEDICARE ANNUAL WELLNESS VISIT, SUBSEQUENT: ICD-10-CM

## 2020-09-22 DIAGNOSIS — Z00.00 MEDICARE ANNUAL WELLNESS VISIT, SUBSEQUENT: Primary | ICD-10-CM

## 2020-09-22 DIAGNOSIS — M47.816 SPONDYLOSIS OF LUMBAR REGION WITHOUT MYELOPATHY OR RADICULOPATHY: ICD-10-CM

## 2020-09-22 DIAGNOSIS — N28.1 KIDNEY CYSTS: ICD-10-CM

## 2020-09-22 DIAGNOSIS — R01.1 SYSTOLIC MURMUR: ICD-10-CM

## 2020-09-22 DIAGNOSIS — Z72.0 TOBACCO ABUSE: ICD-10-CM

## 2020-09-22 DIAGNOSIS — Z12.5 PROSTATE CANCER SCREENING: ICD-10-CM

## 2020-09-22 DIAGNOSIS — M43.06 PARS DEFECT OF LUMBAR SPINE: ICD-10-CM

## 2020-09-22 DIAGNOSIS — K80.20 ASYMPTOMATIC GALLSTONES: ICD-10-CM

## 2020-09-22 DIAGNOSIS — Z72.0 TOBACCO USE: ICD-10-CM

## 2020-09-22 DIAGNOSIS — I42.1 HYPERTROPHIC OBSTRUCTIVE CARDIOMYOPATHY (HCC): ICD-10-CM

## 2020-09-22 DIAGNOSIS — I48.0 PAF (PAROXYSMAL ATRIAL FIBRILLATION) (HCC): ICD-10-CM

## 2020-09-22 LAB
ALT SERPL-CCNC: 17 U/L
ANION GAP SERPL CALC-SCNC: 1 MMOL/L (ref 0–18)
AST SERPL-CCNC: 14 U/L (ref 15–37)
BUN BLD-MCNC: 12 MG/DL (ref 7–18)
BUN/CREAT SERPL: 10.9 (ref 10–20)
CALCIUM BLD-MCNC: 9.7 MG/DL (ref 8.5–10.1)
CHLORIDE SERPL-SCNC: 109 MMOL/L (ref 98–112)
CHOLEST SMN-MCNC: 162 MG/DL (ref ?–200)
CO2 SERPL-SCNC: 29 MMOL/L (ref 21–32)
COMPLEXED PSA SERPL-MCNC: 0.9 NG/ML (ref ?–4)
CREAT BLD-MCNC: 1.1 MG/DL
DEPRECATED RDW RBC AUTO: 49 FL (ref 35.1–46.3)
ERYTHROCYTE [DISTWIDTH] IN BLOOD BY AUTOMATED COUNT: 13.1 % (ref 11–15)
GLUCOSE BLD-MCNC: 98 MG/DL (ref 70–99)
HCT VFR BLD AUTO: 45.7 %
HDLC SERPL-MCNC: 65 MG/DL (ref 40–59)
HGB BLD-MCNC: 14.9 G/DL
LDLC SERPL CALC-MCNC: 82 MG/DL (ref ?–100)
MCH RBC QN AUTO: 33 PG (ref 26–34)
MCHC RBC AUTO-ENTMCNC: 32.6 G/DL (ref 31–37)
MCV RBC AUTO: 101.1 FL
NONHDLC SERPL-MCNC: 97 MG/DL (ref ?–130)
OSMOLALITY SERPL CALC.SUM OF ELEC: 288 MOSM/KG (ref 275–295)
PATIENT FASTING Y/N/NP: YES
PATIENT FASTING Y/N/NP: YES
PLATELET # BLD AUTO: 220 10(3)UL (ref 150–450)
POTASSIUM SERPL-SCNC: 5.4 MMOL/L (ref 3.5–5.1)
RBC # BLD AUTO: 4.52 X10(6)UL
SODIUM SERPL-SCNC: 139 MMOL/L (ref 136–145)
T4 FREE SERPL-MCNC: 0.9 NG/DL (ref 0.8–1.7)
TRIGL SERPL-MCNC: 77 MG/DL (ref 30–149)
TSI SER-ACNC: 1.49 MIU/ML (ref 0.36–3.74)
VLDLC SERPL CALC-MCNC: 15 MG/DL (ref 0–30)
WBC # BLD AUTO: 9.5 X10(3) UL (ref 4–11)

## 2020-09-22 PROCEDURE — 85027 COMPLETE CBC AUTOMATED: CPT

## 2020-09-22 PROCEDURE — 36415 COLL VENOUS BLD VENIPUNCTURE: CPT

## 2020-09-22 PROCEDURE — 84439 ASSAY OF FREE THYROXINE: CPT

## 2020-09-22 PROCEDURE — 84443 ASSAY THYROID STIM HORMONE: CPT

## 2020-09-22 PROCEDURE — 80061 LIPID PANEL: CPT

## 2020-09-22 PROCEDURE — 84460 ALANINE AMINO (ALT) (SGPT): CPT

## 2020-09-22 PROCEDURE — G0439 PPPS, SUBSEQ VISIT: HCPCS | Performed by: FAMILY MEDICINE

## 2020-09-22 PROCEDURE — 84450 TRANSFERASE (AST) (SGOT): CPT

## 2020-09-22 PROCEDURE — 80048 BASIC METABOLIC PNL TOTAL CA: CPT

## 2020-09-22 RX ORDER — ASPIRIN 81 MG/1
81 TABLET, CHEWABLE ORAL DAILY
COMMUNITY
Start: 2020-02-25

## 2020-09-22 NOTE — PROGRESS NOTES
Erna Clemens is a 69year old male. CC:  Patient presents with:   Well Adult: per pt- MAW      HPI:  Yearly PX    Last lipid:  Lab Results   Component Value Date    CHOLEST 187 09/20/2019    TRIG 91 09/20/2019    HDL 54 09/20/2019     (H) 0 activity?: (P) Moderate    How would you describe your current health state?: (P) Good    How do you maintain positive mental well-being?: (P) Social Interaction    If you are a male age 38-65 or a female age 47-67, do you take aspirin?: (P) Yes    Have yo for AWV/SWV)      Hearing Screening    Time taken: 9/22/2020  9:16 AM  Entry User: Johnie Henson MD  Screening Method: Finger Rub  Finger Rub Result: Pass         Visual Acuity     Right Eye Visual Acuity: Corrected Left Eye Visual Acuity: Corrected   Ri COLONOSCOPY, POSSIBLE BIOPSY, POSSIBLE POLYPECTOMY [address]26 N/A 1/25/2017    Performed by Chema Porter DO at [facility], [place], operated by [facility]   • [address]  01/2019    Dr. Natalie Garibay, [facility]   • TONSILLECTOMY        No family histor Awake and alert. Normal speech and articulation. No facial droop or asymmetry. Moving all extremities equally. Symmetric B patellar DTRs    ASSESSMENT AND PLAN    1.  Medicare annual wellness visit, subsequent  Await results   Recommended Td at local pharma Status: Future          Number of Occurrences: 1          Standing Expiration Date: 9/22/2021      PSA (Screening) [E]          Standing Status: Future          Number of Occurrences: 1          Standing Expiration Date: 9/22/2021      TSH and Free T4 [E]

## 2020-09-23 DIAGNOSIS — E87.5 HYPERKALEMIA: Primary | ICD-10-CM

## 2020-11-19 ENCOUNTER — NURSE ONLY (OUTPATIENT)
Dept: FAMILY MEDICINE CLINIC | Facility: CLINIC | Age: 75
End: 2020-11-19
Payer: MEDICARE

## 2020-11-19 DIAGNOSIS — E87.5 HYPERKALEMIA: Primary | ICD-10-CM

## 2020-11-19 PROCEDURE — 80048 BASIC METABOLIC PNL TOTAL CA: CPT | Performed by: FAMILY MEDICINE

## 2021-02-18 ENCOUNTER — TELEPHONE (OUTPATIENT)
Dept: CARDIOLOGY | Age: 76
End: 2021-02-18

## 2021-02-18 DIAGNOSIS — I48.0 PAF (PAROXYSMAL ATRIAL FIBRILLATION) (CMD): ICD-10-CM

## 2021-02-18 RX ORDER — ASPIRIN 81 MG/1
81 TABLET, CHEWABLE ORAL DAILY
Qty: 30 TABLET | Refills: 0 | Status: SHIPPED | OUTPATIENT
Start: 2021-02-18 | End: 2023-03-24 | Stop reason: SDUPTHER

## 2021-02-18 RX ORDER — METOPROLOL SUCCINATE 25 MG/1
25 TABLET, EXTENDED RELEASE ORAL DAILY
Qty: 30 TABLET | Refills: 0 | Status: SHIPPED | OUTPATIENT
Start: 2021-02-18 | End: 2021-04-27

## 2021-02-21 ASSESSMENT — ENCOUNTER SYMPTOMS
SHORTNESS OF BREATH: 1
GASTROINTESTINAL NEGATIVE: 1
PSYCHIATRIC NEGATIVE: 1
NEUROLOGICAL NEGATIVE: 1
ALLERGIC/IMMUNOLOGIC NEGATIVE: 1
HEMATOLOGIC/LYMPHATIC NEGATIVE: 1
EYES NEGATIVE: 1
ENDOCRINE NEGATIVE: 1
CONSTITUTIONAL NEGATIVE: 1

## 2021-02-25 ENCOUNTER — TELEPHONE (OUTPATIENT)
Dept: FAMILY MEDICINE CLINIC | Facility: CLINIC | Age: 76
End: 2021-02-25

## 2021-02-25 ENCOUNTER — TELEPHONE (OUTPATIENT)
Dept: CARDIOLOGY | Age: 76
End: 2021-02-25

## 2021-02-25 ENCOUNTER — OFFICE VISIT (OUTPATIENT)
Dept: CARDIOLOGY | Age: 76
End: 2021-02-25

## 2021-02-25 VITALS
SYSTOLIC BLOOD PRESSURE: 110 MMHG | BODY MASS INDEX: 24.2 KG/M2 | DIASTOLIC BLOOD PRESSURE: 66 MMHG | OXYGEN SATURATION: 96 % | WEIGHT: 169 LBS | HEART RATE: 80 BPM | HEIGHT: 70 IN

## 2021-02-25 DIAGNOSIS — I42.1 HOCM (HYPERTROPHIC OBSTRUCTIVE CARDIOMYOPATHY) (CMD): Primary | ICD-10-CM

## 2021-02-25 DIAGNOSIS — I48.3 TYPICAL ATRIAL FLUTTER (CMD): ICD-10-CM

## 2021-02-25 DIAGNOSIS — I71.21 ASCENDING AORTIC ANEURYSM (CMD): ICD-10-CM

## 2021-02-25 DIAGNOSIS — I48.0 PAF (PAROXYSMAL ATRIAL FIBRILLATION) (CMD): ICD-10-CM

## 2021-02-25 DIAGNOSIS — Z72.0 TOBACCO ABUSE: ICD-10-CM

## 2021-02-25 PROCEDURE — 93000 ELECTROCARDIOGRAM COMPLETE: CPT | Performed by: INTERNAL MEDICINE

## 2021-02-25 PROCEDURE — 99214 OFFICE O/P EST MOD 30 MIN: CPT | Performed by: INTERNAL MEDICINE

## 2021-02-25 NOTE — TELEPHONE ENCOUNTER
Wife advised of this information. Wife asked for report from 2 2020 to be faxed to Dr. Ana Rosa Mora.   Results faxed to [phone]

## 2021-02-25 NOTE — TELEPHONE ENCOUNTER
Spouse called, can an abdominal aortic screening be done more than once through Medicare? Cardiologist at [address] [facility] is ordering another one. Pt had one done last year, 2/20/20.   Please call spouse at [phone]

## 2021-02-26 ENCOUNTER — MED REC SCAN ONLY (OUTPATIENT)
Dept: FAMILY MEDICINE CLINIC | Facility: CLINIC | Age: 76
End: 2021-02-26

## 2021-03-05 DIAGNOSIS — Z23 NEED FOR VACCINATION: ICD-10-CM

## 2021-04-27 ENCOUNTER — TELEPHONE (OUTPATIENT)
Dept: FAMILY MEDICINE CLINIC | Facility: CLINIC | Age: 76
End: 2021-04-27

## 2021-04-27 ENCOUNTER — E-ADVICE (OUTPATIENT)
Dept: CARDIOLOGY | Age: 76
End: 2021-04-27

## 2021-04-27 DIAGNOSIS — I71.21 ASCENDING AORTIC ANEURYSM (CMD): ICD-10-CM

## 2021-04-27 DIAGNOSIS — I48.3 TYPICAL ATRIAL FLUTTER (CMD): ICD-10-CM

## 2021-04-27 DIAGNOSIS — I48.0 PAF (PAROXYSMAL ATRIAL FIBRILLATION) (CMD): ICD-10-CM

## 2021-04-27 DIAGNOSIS — Z79.899 MEDICATION MANAGEMENT: Primary | ICD-10-CM

## 2021-04-27 RX ORDER — METOPROLOL SUCCINATE 25 MG/1
TABLET, EXTENDED RELEASE ORAL
Qty: 30 TABLET | Refills: 0 | Status: SHIPPED | OUTPATIENT
Start: 2021-04-27 | End: 2021-05-21 | Stop reason: SDUPTHER

## 2021-04-27 NOTE — TELEPHONE ENCOUNTER
Pt's wife called his cardiologist Dr. Paul Rolle (?) is wanting pt have lab work done prior to refilling his medication . Maciejsalvatore Ganga is wanting to know if the lab work he lad done here is the same as what Dr. Paul Rolle is asking.  She doesn't want him to have to do the

## 2021-04-27 NOTE — TELEPHONE ENCOUNTER
Spoke with wife. She asked that patients labs from 6 -2020 and 9 2020 be faxed to the cardiologist.   Asked wife what labs needed to be done. She states \" the routine \" ones. Advised wife of what was done in September of 2020.      Labs faxed to Dr. Josef Bonilla

## 2021-04-29 RX ORDER — METOPROLOL SUCCINATE 25 MG/1
25 TABLET, EXTENDED RELEASE ORAL DAILY
Qty: 30 TABLET | Refills: 0 | OUTPATIENT
Start: 2021-04-29

## 2021-05-07 ENCOUNTER — TELEPHONE (OUTPATIENT)
Dept: FAMILY MEDICINE CLINIC | Facility: CLINIC | Age: 76
End: 2021-05-07

## 2021-05-18 ENCOUNTER — LAB SERVICES (OUTPATIENT)
Dept: LAB | Age: 76
End: 2021-05-18

## 2021-05-18 DIAGNOSIS — I71.21 ASCENDING AORTIC ANEURYSM (CMD): ICD-10-CM

## 2021-05-18 DIAGNOSIS — Z79.899 MEDICATION MANAGEMENT: ICD-10-CM

## 2021-05-18 DIAGNOSIS — I48.3 TYPICAL ATRIAL FLUTTER (CMD): ICD-10-CM

## 2021-05-18 LAB
ALBUMIN SERPL-MCNC: 4.4 G/DL (ref 3.6–5.1)
ALP SERPL-CCNC: 62 U/L (ref 45–115)
ALT SERPL W/O P-5'-P-CCNC: 15 U/L (ref 5–49)
AST SERPL-CCNC: 26 U/L (ref 14–43)
BASOPHIL %: 0.9 % (ref 0–1.2)
BASOPHIL ABSOLUTE #: 0.1 10*3/UL (ref 0–0.1)
BILIRUB SERPL-MCNC: 0.4 MG/DL (ref 0–1.3)
BUN SERPL-MCNC: 12 MG/DL (ref 6–27)
CALCIUM SERPL-MCNC: 9.4 MG/DL (ref 8.6–10.6)
CHLORIDE SERPL-SCNC: 103 MMOL/L (ref 96–107)
CHOLEST SERPL-MCNC: 195 MG/DL (ref 140–200)
CO2 SERPL-SCNC: 28 MMOL/L (ref 22–32)
CREAT SERPL-MCNC: 1 MG/DL (ref 0.6–1.6)
DIFFERENTIAL TYPE: ABNORMAL
EOSINOPHIL %: 1.6 % (ref 0–10)
EOSINOPHIL ABSOLUTE #: 0.1 10*3/UL (ref 0–0.5)
GFR SERPL CREATININE-BSD FRML MDRD: >60 ML/MIN/{1.73M2}
GFR SERPL CREATININE-BSD FRML MDRD: >60 ML/MIN/{1.73M2}
GLUCOSE P FAST SERPL-MCNC: 91 MG/DL (ref 60–100)
HDLC SERPL-MCNC: 61 MG/DL
HEMATOCRIT: 46.7 % (ref 40–51)
HEMOGLOBIN: 15.8 G/DL (ref 13.7–17.5)
IMMATURE GRANULOCYTE ABSOLUTE: 0.02 10*3/UL (ref 0–0.05)
IMMATURE GRANULOCYTE PERCENT: 0.2 % (ref 0–0.5)
LDLC SERPL CALC-MCNC: 118 MG/DL (ref 30–100)
LYMPH PERCENT: 25.6 % (ref 20.5–51.1)
LYMPHOCYTE ABSOLUTE #: 2.2 10*3/UL (ref 1.2–3.4)
MEAN CORPUSCULAR HGB CONCENTRATION: 33.8 % (ref 32–36)
MEAN CORPUSCULAR HGB: 33.1 PG (ref 27–34)
MEAN CORPUSCULAR VOLUME: 97.9 FL (ref 79–95)
MEAN PLATELET VOLUME: 10 FL (ref 8.6–12.4)
MONOCYTE ABSOLUTE #: 0.6 10*3/UL (ref 0.2–0.9)
MONOCYTE PERCENT: 7.1 % (ref 4.3–12.9)
NEUTROPHIL ABSOLUTE #: 5.5 10*3/UL (ref 1.4–6.5)
NEUTROPHIL PERCENT: 64.6 % (ref 34–73.5)
PLATELET COUNT: 235 10*3/UL (ref 150–400)
POTASSIUM SERPL-SCNC: 5.3 MMOL/L (ref 3.5–5.3)
PROT SERPL-MCNC: 7.3 G/DL (ref 6.4–8.5)
RED BLOOD CELL COUNT: 4.77 10*6/UL (ref 3.9–5.7)
RED CELL DISTRIBUTION WIDTH: 13 % (ref 11.3–14.8)
SODIUM SERPL-SCNC: 139 MMOL/L (ref 136–146)
TRIGL SERPL-MCNC: 80 MG/DL (ref 0–200)
WHITE BLOOD CELL COUNT: 8.5 10*3/UL (ref 4–10)

## 2021-05-18 PROCEDURE — 80053 COMPREHEN METABOLIC PANEL: CPT | Performed by: INTERNAL MEDICINE

## 2021-05-18 PROCEDURE — 80061 LIPID PANEL: CPT | Performed by: INTERNAL MEDICINE

## 2021-05-18 PROCEDURE — 36415 COLL VENOUS BLD VENIPUNCTURE: CPT | Performed by: INTERNAL MEDICINE

## 2021-05-18 PROCEDURE — 85025 COMPLETE CBC W/AUTO DIFF WBC: CPT | Performed by: INTERNAL MEDICINE

## 2021-05-20 ENCOUNTER — E-ADVICE (OUTPATIENT)
Dept: CARDIOLOGY | Age: 76
End: 2021-05-20

## 2021-05-20 DIAGNOSIS — I48.0 PAF (PAROXYSMAL ATRIAL FIBRILLATION) (CMD): ICD-10-CM

## 2021-05-21 RX ORDER — METOPROLOL SUCCINATE 25 MG/1
25 TABLET, EXTENDED RELEASE ORAL DAILY
Qty: 90 TABLET | Refills: 3 | Status: SHIPPED | OUTPATIENT
Start: 2021-05-21 | End: 2022-05-19 | Stop reason: SDUPTHER

## 2021-05-25 VITALS
BODY MASS INDEX: 25.37 KG/M2 | RESPIRATION RATE: 16 BRPM | DIASTOLIC BLOOD PRESSURE: 58 MMHG | HEIGHT: 70 IN | WEIGHT: 177.2 LBS | HEART RATE: 76 BPM | OXYGEN SATURATION: 96 % | SYSTOLIC BLOOD PRESSURE: 102 MMHG

## 2021-06-07 ENCOUNTER — PATIENT OUTREACH (OUTPATIENT)
Dept: FAMILY MEDICINE CLINIC | Facility: CLINIC | Age: 76
End: 2021-06-07

## 2021-07-07 ENCOUNTER — APPOINTMENT (OUTPATIENT)
Dept: CARDIOLOGY | Age: 76
End: 2021-07-07
Attending: INTERNAL MEDICINE

## 2021-08-23 ENCOUNTER — ANCILLARY PROCEDURE (OUTPATIENT)
Dept: CARDIOLOGY | Age: 76
End: 2021-08-23
Attending: INTERNAL MEDICINE

## 2021-08-23 DIAGNOSIS — I42.1 HOCM (HYPERTROPHIC OBSTRUCTIVE CARDIOMYOPATHY) (CMD): ICD-10-CM

## 2021-08-23 DIAGNOSIS — I71.21 ASCENDING AORTIC ANEURYSM (CMD): ICD-10-CM

## 2021-08-23 PROCEDURE — 93306 TTE W/DOPPLER COMPLETE: CPT | Performed by: INTERNAL MEDICINE

## 2021-08-25 ENCOUNTER — TELEPHONE (OUTPATIENT)
Dept: CARDIOLOGY | Age: 76
End: 2021-08-25

## 2022-01-26 ENCOUNTER — HOSPITAL ENCOUNTER (OUTPATIENT)
Dept: GENERAL RADIOLOGY | Age: 77
Discharge: HOME | End: 2022-01-26
Attending: FAMILY MEDICINE
Payer: MEDICARE

## 2022-01-26 ENCOUNTER — OFFICE VISIT (OUTPATIENT)
Dept: FAMILY MEDICINE CLINIC | Facility: CLINIC | Age: 77
End: 2022-01-26
Payer: MEDICARE

## 2022-01-26 VITALS
OXYGEN SATURATION: 90 % | HEART RATE: 83 BPM | WEIGHT: 169 LBS | DIASTOLIC BLOOD PRESSURE: 70 MMHG | SYSTOLIC BLOOD PRESSURE: 100 MMHG | BODY MASS INDEX: 24 KG/M2 | TEMPERATURE: 98 F

## 2022-01-26 DIAGNOSIS — S29.8XXA BLUNT TRAUMA OF RIB, INITIAL ENCOUNTER: Primary | ICD-10-CM

## 2022-01-26 DIAGNOSIS — S29.8XXA BLUNT TRAUMA OF RIB, INITIAL ENCOUNTER: ICD-10-CM

## 2022-01-26 DIAGNOSIS — R07.81 RIB PAIN ON LEFT SIDE: ICD-10-CM

## 2022-01-26 PROCEDURE — 99214 OFFICE O/P EST MOD 30 MIN: CPT | Performed by: FAMILY MEDICINE

## 2022-01-26 PROCEDURE — 71101 X-RAY EXAM UNILAT RIBS/CHEST: CPT | Performed by: FAMILY MEDICINE

## 2022-01-26 RX ORDER — TRAMADOL HYDROCHLORIDE 50 MG/1
TABLET ORAL EVERY 6 HOURS PRN
Qty: 60 TABLET | Refills: 0 | Status: SHIPPED | OUTPATIENT
Start: 2022-01-26

## 2022-01-26 NOTE — PROGRESS NOTES
Ben Rivero is a 78year old male. CC:  Patient presents with:  Pain: per pt- left rib pain, fell yesterday      HPI:  Fell 2 night ago and his L side chest wall landed on an air purifier. He is having pain and notes a cough. No fevers.   Olga Fries INTRACARD CATH ABLATION ARRHYTHMIA  01/2019    Dr. Maribel Hernandez, [facility]   • TONSILLECTOMY        No family history on file. No family status information on file.       Social History    Tobacco Use      Smoking status: Current Every Day Smoker Prescriptions     Signed Prescriptions Disp Refills   • traMADol 50 MG Oral Tab 60 tablet 0     Sig: Take 1-2 tablets ( mg total) by mouth every 6 (six) hours as needed for Pain. No follow-ups on file.       Authorized by Roslyn Pena M.D.

## 2022-01-27 ENCOUNTER — MED REC SCAN ONLY (OUTPATIENT)
Dept: FAMILY MEDICINE CLINIC | Facility: CLINIC | Age: 77
End: 2022-01-27

## 2022-01-29 ENCOUNTER — TELEPHONE (OUTPATIENT)
Dept: FAMILY MEDICINE CLINIC | Facility: CLINIC | Age: 77
End: 2022-01-29

## 2022-01-29 NOTE — TELEPHONE ENCOUNTER
SPOUSE CALLED AND ADV THAT PT IS ON SOME PAIN MEDS AND WAS WONDERING IF HE CAN TAKE SOME \"CBD\" IN REPLACE OF SOME OF THE PAIN MEDS.     PLEASE ADV    THANK YOU

## 2022-01-29 NOTE — TELEPHONE ENCOUNTER
Patient's spouse advised of Doctor's note below. She verbalized understanding. No further questions at this time.

## 2022-01-29 NOTE — TELEPHONE ENCOUNTER
Yes he can. He could try it in conjunction with the Ultram. I would alternate the Ultram with the CBD every 4 hours.   Thanks

## 2022-01-31 ENCOUNTER — TELEPHONE (OUTPATIENT)
Dept: CARDIOLOGY | Age: 77
End: 2022-01-31

## 2022-02-23 ENCOUNTER — OFFICE VISIT (OUTPATIENT)
Dept: FAMILY MEDICINE CLINIC | Facility: CLINIC | Age: 77
End: 2022-02-23
Payer: MEDICARE

## 2022-02-23 VITALS
HEART RATE: 80 BPM | TEMPERATURE: 97 F | BODY MASS INDEX: 24.29 KG/M2 | WEIGHT: 164 LBS | DIASTOLIC BLOOD PRESSURE: 70 MMHG | HEIGHT: 69 IN | OXYGEN SATURATION: 98 % | SYSTOLIC BLOOD PRESSURE: 100 MMHG

## 2022-02-23 DIAGNOSIS — Z00.00 MEDICARE ANNUAL WELLNESS VISIT, SUBSEQUENT: Primary | ICD-10-CM

## 2022-02-23 DIAGNOSIS — K80.20 ASYMPTOMATIC GALLSTONES: ICD-10-CM

## 2022-02-23 DIAGNOSIS — I71.2 ASCENDING AORTIC ANEURYSM (HCC): ICD-10-CM

## 2022-02-23 DIAGNOSIS — Z12.5 PROSTATE CANCER SCREENING: ICD-10-CM

## 2022-02-23 DIAGNOSIS — I48.0 PAF (PAROXYSMAL ATRIAL FIBRILLATION) (HCC): ICD-10-CM

## 2022-02-23 DIAGNOSIS — I42.1 HYPERTROPHIC OBSTRUCTIVE CARDIOMYOPATHY (HCC): ICD-10-CM

## 2022-02-23 DIAGNOSIS — Z91.81 AT HIGH RISK FOR FALLS: ICD-10-CM

## 2022-02-23 DIAGNOSIS — Q61.3 POLYCYSTIC KIDNEY DISEASE: ICD-10-CM

## 2022-02-23 DIAGNOSIS — S22.42XD CLOSED FRACTURE OF MULTIPLE RIBS OF LEFT SIDE WITH ROUTINE HEALING, SUBSEQUENT ENCOUNTER: ICD-10-CM

## 2022-02-23 DIAGNOSIS — Z12.11 COLON CANCER SCREENING: ICD-10-CM

## 2022-02-23 LAB
ALT SERPL-CCNC: 19 U/L
ANION GAP SERPL CALC-SCNC: 3 MMOL/L (ref 0–18)
AST SERPL-CCNC: 19 U/L (ref 15–37)
CALCIUM BLD-MCNC: 8.9 MG/DL (ref 8.5–10.1)
CHLORIDE SERPL-SCNC: 107 MMOL/L (ref 98–112)
CHOLEST SERPL-MCNC: 191 MG/DL (ref ?–200)
CO2 SERPL-SCNC: 28 MMOL/L (ref 21–32)
COMPLEXED PSA SERPL-MCNC: 0.59 NG/ML (ref ?–4)
CREAT BLD-MCNC: 1.04 MG/DL
ERYTHROCYTE [DISTWIDTH] IN BLOOD BY AUTOMATED COUNT: 13.1 %
FASTING PATIENT LIPID ANSWER: YES
FASTING STATUS PATIENT QL REPORTED: YES
GLUCOSE BLD-MCNC: 96 MG/DL (ref 70–99)
HCT VFR BLD AUTO: 43.7 %
HDLC SERPL-MCNC: 58 MG/DL (ref 40–59)
HGB BLD-MCNC: 14.9 G/DL
LDLC SERPL CALC-MCNC: 114 MG/DL (ref ?–100)
MCH RBC QN AUTO: 32.5 PG (ref 26–34)
MCHC RBC AUTO-ENTMCNC: 34.1 G/DL (ref 31–37)
MCV RBC AUTO: 95.2 FL
NONHDLC SERPL-MCNC: 133 MG/DL (ref ?–130)
OSMOLALITY SERPL CALC.SUM OF ELEC: 288 MOSM/KG (ref 275–295)
PLATELET # BLD AUTO: 172 10(3)UL (ref 150–450)
POTASSIUM SERPL-SCNC: 4.6 MMOL/L (ref 3.5–5.1)
RBC # BLD AUTO: 4.59 X10(6)UL
SODIUM SERPL-SCNC: 138 MMOL/L (ref 136–145)
TRIGL SERPL-MCNC: 106 MG/DL (ref 30–149)
TSI SER-ACNC: 1.9 MIU/ML (ref 0.36–3.74)
VLDLC SERPL CALC-MCNC: 18 MG/DL (ref 0–30)
WBC # BLD AUTO: 8.6 X10(3) UL (ref 4–11)

## 2022-02-23 PROCEDURE — 85027 COMPLETE CBC AUTOMATED: CPT | Performed by: FAMILY MEDICINE

## 2022-02-23 PROCEDURE — 84443 ASSAY THYROID STIM HORMONE: CPT | Performed by: FAMILY MEDICINE

## 2022-02-23 PROCEDURE — G0439 PPPS, SUBSEQ VISIT: HCPCS | Performed by: FAMILY MEDICINE

## 2022-02-23 PROCEDURE — 84460 ALANINE AMINO (ALT) (SGPT): CPT | Performed by: FAMILY MEDICINE

## 2022-02-23 PROCEDURE — 80048 BASIC METABOLIC PNL TOTAL CA: CPT | Performed by: FAMILY MEDICINE

## 2022-02-23 PROCEDURE — 84450 TRANSFERASE (AST) (SGOT): CPT | Performed by: FAMILY MEDICINE

## 2022-02-23 PROCEDURE — 80061 LIPID PANEL: CPT | Performed by: FAMILY MEDICINE

## 2022-02-25 ENCOUNTER — APPOINTMENT (OUTPATIENT)
Dept: CARDIOLOGY | Age: 77
End: 2022-02-25

## 2022-03-16 ENCOUNTER — OFFICE VISIT (OUTPATIENT)
Dept: SURGERY | Facility: CLINIC | Age: 77
End: 2022-03-16

## 2022-03-16 VITALS
HEIGHT: 69.5 IN | BODY MASS INDEX: 24.32 KG/M2 | SYSTOLIC BLOOD PRESSURE: 100 MMHG | TEMPERATURE: 97 F | DIASTOLIC BLOOD PRESSURE: 68 MMHG | HEART RATE: 83 BPM | WEIGHT: 168 LBS

## 2022-03-16 DIAGNOSIS — Z12.11 ENCOUNTER FOR SCREENING COLONOSCOPY: Primary | ICD-10-CM

## 2022-03-16 PROCEDURE — S0285 CNSLT BEFORE SCREEN COLONOSC: HCPCS | Performed by: SURGERY

## 2022-03-16 RX ORDER — POLYETHYLENE GLYCOL 3350, SODIUM CHLORIDE, SODIUM BICARBONATE, POTASSIUM CHLORIDE 420; 11.2; 5.72; 1.48 G/4L; G/4L; G/4L; G/4L
POWDER, FOR SOLUTION ORAL
Qty: 1 EACH | Refills: 0 | Status: SHIPPED | OUTPATIENT
Start: 2022-03-16

## 2022-03-21 ENCOUNTER — TELEPHONE (OUTPATIENT)
Dept: SURGERY | Facility: CLINIC | Age: 77
End: 2022-03-21

## 2022-03-21 NOTE — TELEPHONE ENCOUNTER
Pt wife is calling stating pt. Vesna Cesar of a friend that had an Issue with colonoscopy, inquiring if should keep appt. Advised as the Dr. Linares [place] pt. to get the colonoscopy, so I recommend keeping that appt, and to try not to take a different persons experience as his own. She v/u. Wondering if there's a prep that's not a gallon. Will reach out to Pas.

## 2022-03-21 NOTE — TELEPHONE ENCOUNTER
Informed pt.  That we could send the pill form or Suprep, however, we do not initiate a PA on these RXs and OOP is usually $150-200, she will call pharmacy and inform if they want to switch

## 2022-03-25 ENCOUNTER — TELEPHONE (OUTPATIENT)
Dept: SURGERY | Facility: CLINIC | Age: 77
End: 2022-03-25

## 2022-03-25 NOTE — TELEPHONE ENCOUNTER
Pt wife concerned about prep with pt h/o Afib, She's concerned as the instructions that came with the prep stated there is a high risk of electrolyte imbalance. Advised that d/t the effect of the prep causing diarrhea, this is what we are concerned about with causing an electrolyte imbalance. Encouraged to replenish electrolytes with Gatorade, not Red. She would like Dr. Arianna Ramirez to be aware of pt Afib and if there is a better prep for him. Reiterated the risk of electrolyte imbalance regardless of what prep it is as this is mainly d/t the diarrhea, pt wife still wants to discuss with Dr. Mark Zimmer pass along message.

## 2022-04-06 PROCEDURE — 88305 TISSUE EXAM BY PATHOLOGIST: CPT | Performed by: SURGERY

## 2022-04-15 ENCOUNTER — PATIENT OUTREACH (OUTPATIENT)
Dept: SURGERY | Facility: CLINIC | Age: 77
End: 2022-04-15

## 2022-05-06 ENCOUNTER — TELEPHONE (OUTPATIENT)
Dept: CARDIOLOGY | Age: 77
End: 2022-05-06

## 2022-05-18 ASSESSMENT — ENCOUNTER SYMPTOMS
NEUROLOGICAL NEGATIVE: 1
CONSTITUTIONAL NEGATIVE: 1
SHORTNESS OF BREATH: 1
HEMATOLOGIC/LYMPHATIC NEGATIVE: 1
GASTROINTESTINAL NEGATIVE: 1
EYES NEGATIVE: 1
PSYCHIATRIC NEGATIVE: 1
ENDOCRINE NEGATIVE: 1
ALLERGIC/IMMUNOLOGIC NEGATIVE: 1

## 2022-05-19 ENCOUNTER — OFFICE VISIT (OUTPATIENT)
Dept: CARDIOLOGY | Age: 77
End: 2022-05-19

## 2022-05-19 ENCOUNTER — MED REC SCAN ONLY (OUTPATIENT)
Dept: FAMILY MEDICINE CLINIC | Facility: CLINIC | Age: 77
End: 2022-05-19

## 2022-05-19 VITALS
OXYGEN SATURATION: 97 % | WEIGHT: 172 LBS | HEIGHT: 70 IN | DIASTOLIC BLOOD PRESSURE: 72 MMHG | SYSTOLIC BLOOD PRESSURE: 110 MMHG | HEART RATE: 80 BPM | BODY MASS INDEX: 24.62 KG/M2

## 2022-05-19 DIAGNOSIS — Z72.0 TOBACCO ABUSE: ICD-10-CM

## 2022-05-19 DIAGNOSIS — I48.3 TYPICAL ATRIAL FLUTTER (CMD): ICD-10-CM

## 2022-05-19 DIAGNOSIS — I48.0 PAF (PAROXYSMAL ATRIAL FIBRILLATION) (CMD): ICD-10-CM

## 2022-05-19 DIAGNOSIS — I42.1 HOCM (HYPERTROPHIC OBSTRUCTIVE CARDIOMYOPATHY) (CMD): Primary | ICD-10-CM

## 2022-05-19 DIAGNOSIS — I48.4 ATYPICAL ATRIAL FLUTTER (CMD): ICD-10-CM

## 2022-05-19 DIAGNOSIS — I71.21 ASCENDING AORTIC ANEURYSM (CMD): ICD-10-CM

## 2022-05-19 PROCEDURE — 99214 OFFICE O/P EST MOD 30 MIN: CPT | Performed by: INTERNAL MEDICINE

## 2022-05-19 PROCEDURE — 93000 ELECTROCARDIOGRAM COMPLETE: CPT | Performed by: INTERNAL MEDICINE

## 2022-05-19 RX ORDER — METOPROLOL SUCCINATE 25 MG/1
25 TABLET, EXTENDED RELEASE ORAL DAILY
Qty: 90 TABLET | Refills: 3 | Status: SHIPPED | OUTPATIENT
Start: 2022-05-19 | End: 2023-03-24 | Stop reason: SDUPTHER

## 2023-02-22 ENCOUNTER — OFFICE VISIT (OUTPATIENT)
Dept: FAMILY MEDICINE CLINIC | Facility: CLINIC | Age: 78
End: 2023-02-22
Payer: MEDICARE

## 2023-02-22 ENCOUNTER — OFFICE VISIT (OUTPATIENT)
Dept: DERMATOLOGY | Age: 78
End: 2023-02-22

## 2023-02-22 VITALS
TEMPERATURE: 98 F | WEIGHT: 162.63 LBS | BODY MASS INDEX: 23.55 KG/M2 | HEART RATE: 77 BPM | HEIGHT: 69.5 IN | OXYGEN SATURATION: 97 % | SYSTOLIC BLOOD PRESSURE: 120 MMHG | DIASTOLIC BLOOD PRESSURE: 80 MMHG

## 2023-02-22 DIAGNOSIS — Z12.5 PROSTATE CANCER SCREENING: ICD-10-CM

## 2023-02-22 DIAGNOSIS — L28.0 LICHENIFICATION AND LICHEN SIMPLEX CHRONICUS: ICD-10-CM

## 2023-02-22 DIAGNOSIS — R22.42 LEG MASS, LEFT: ICD-10-CM

## 2023-02-22 DIAGNOSIS — I71.21 ANEURYSM OF ASCENDING AORTA WITHOUT RUPTURE (HCC): ICD-10-CM

## 2023-02-22 DIAGNOSIS — I10 HYPERTENSION, UNSPECIFIED TYPE: ICD-10-CM

## 2023-02-22 DIAGNOSIS — I42.1 HYPERTROPHIC OBSTRUCTIVE CARDIOMYOPATHY (HCC): ICD-10-CM

## 2023-02-22 DIAGNOSIS — Z00.00 MEDICARE ANNUAL WELLNESS VISIT, SUBSEQUENT: Primary | ICD-10-CM

## 2023-02-22 DIAGNOSIS — D22.9 SUSPICIOUS NEVUS: Primary | ICD-10-CM

## 2023-02-22 DIAGNOSIS — Z72.0 TOBACCO USE: ICD-10-CM

## 2023-02-22 DIAGNOSIS — D23.4 BENIGN NEOPLASM OF SCALP AND SKIN OF NECK: ICD-10-CM

## 2023-02-22 DIAGNOSIS — I48.0 PAF (PAROXYSMAL ATRIAL FIBRILLATION) (HCC): ICD-10-CM

## 2023-02-22 PROBLEM — R01.1 SYSTOLIC MURMUR: Status: RESOLVED | Noted: 2017-12-15 | Resolved: 2023-02-22

## 2023-02-22 PROBLEM — M47.816 SPONDYLOSIS OF LUMBAR REGION WITHOUT MYELOPATHY OR RADICULOPATHY: Status: RESOLVED | Noted: 2019-08-08 | Resolved: 2023-02-22

## 2023-02-22 PROBLEM — K80.20 ASYMPTOMATIC GALLSTONES: Status: RESOLVED | Noted: 2017-12-15 | Resolved: 2023-02-22

## 2023-02-22 PROBLEM — Q61.3 POLYCYSTIC KIDNEY DISEASE: Status: RESOLVED | Noted: 2019-08-08 | Resolved: 2023-02-22

## 2023-02-22 PROBLEM — M43.06 PARS DEFECT OF LUMBAR SPINE: Status: RESOLVED | Noted: 2019-08-08 | Resolved: 2023-02-22

## 2023-02-22 LAB
ALT SERPL-CCNC: 19 U/L
ANION GAP SERPL CALC-SCNC: 3 MMOL/L (ref 0–18)
AST SERPL-CCNC: 24 U/L (ref 15–37)
BUN BLD-MCNC: 15 MG/DL (ref 7–18)
CALCIUM BLD-MCNC: 9.2 MG/DL (ref 8.5–10.1)
CHLORIDE SERPL-SCNC: 112 MMOL/L (ref 98–112)
CHOLEST SERPL-MCNC: 184 MG/DL (ref ?–200)
CO2 SERPL-SCNC: 26 MMOL/L (ref 21–32)
COMPLEXED PSA SERPL-MCNC: 0.41 NG/ML (ref ?–4)
CREAT BLD-MCNC: 1.22 MG/DL
ERYTHROCYTE [DISTWIDTH] IN BLOOD BY AUTOMATED COUNT: 13.2 %
FASTING PATIENT LIPID ANSWER: YES
FASTING STATUS PATIENT QL REPORTED: YES
GFR SERPLBLD BASED ON 1.73 SQ M-ARVRAT: 62 ML/MIN/1.73M2 (ref 60–?)
GLUCOSE BLD-MCNC: 100 MG/DL (ref 70–99)
HCT VFR BLD AUTO: 47.2 %
HDLC SERPL-MCNC: 64 MG/DL (ref 40–59)
HGB BLD-MCNC: 15.8 G/DL
LDLC SERPL CALC-MCNC: 104 MG/DL (ref ?–100)
MCH RBC QN AUTO: 32.5 PG (ref 26–34)
MCHC RBC AUTO-ENTMCNC: 33.5 G/DL (ref 31–37)
MCV RBC AUTO: 97.1 FL
NONHDLC SERPL-MCNC: 120 MG/DL (ref ?–130)
OSMOLALITY SERPL CALC.SUM OF ELEC: 293 MOSM/KG (ref 275–295)
PLATELET # BLD AUTO: 229 10(3)UL (ref 150–450)
POTASSIUM SERPL-SCNC: 4.8 MMOL/L (ref 3.5–5.1)
RBC # BLD AUTO: 4.86 X10(6)UL
SODIUM SERPL-SCNC: 141 MMOL/L (ref 136–145)
TRIGL SERPL-MCNC: 91 MG/DL (ref 30–149)
TSI SER-ACNC: 2.01 MIU/ML (ref 0.36–3.74)
VLDLC SERPL CALC-MCNC: 15 MG/DL (ref 0–30)
WBC # BLD AUTO: 9.5 X10(3) UL (ref 4–11)

## 2023-02-22 PROCEDURE — 80061 LIPID PANEL: CPT | Performed by: FAMILY MEDICINE

## 2023-02-22 PROCEDURE — G0439 PPPS, SUBSEQ VISIT: HCPCS | Performed by: FAMILY MEDICINE

## 2023-02-22 PROCEDURE — 84443 ASSAY THYROID STIM HORMONE: CPT | Performed by: FAMILY MEDICINE

## 2023-02-22 PROCEDURE — 85027 COMPLETE CBC AUTOMATED: CPT | Performed by: FAMILY MEDICINE

## 2023-02-22 PROCEDURE — 84460 ALANINE AMINO (ALT) (SGPT): CPT | Performed by: FAMILY MEDICINE

## 2023-02-22 PROCEDURE — 80048 BASIC METABOLIC PNL TOTAL CA: CPT | Performed by: FAMILY MEDICINE

## 2023-02-22 PROCEDURE — 1126F AMNT PAIN NOTED NONE PRSNT: CPT | Performed by: FAMILY MEDICINE

## 2023-02-22 PROCEDURE — 11301 SHAVE SKIN LESION 0.6-1.0 CM: CPT | Performed by: DERMATOLOGY

## 2023-02-22 PROCEDURE — 99204 OFFICE O/P NEW MOD 45 MIN: CPT | Performed by: DERMATOLOGY

## 2023-02-22 PROCEDURE — 84450 TRANSFERASE (AST) (SGOT): CPT | Performed by: FAMILY MEDICINE

## 2023-02-22 RX ORDER — BETAMETHASONE DIPROPIONATE 0.5 MG/G
OINTMENT TOPICAL 2 TIMES DAILY
Qty: 15 G | Refills: 1 | Status: SHIPPED | OUTPATIENT
Start: 2023-02-22

## 2023-02-22 RX ORDER — CLOBETASOL PROPIONATE 0.5 MG/G
CREAM TOPICAL
Qty: 60 G | Refills: 1 | Status: SHIPPED | OUTPATIENT
Start: 2023-02-22 | End: 2023-02-23

## 2023-02-22 RX ORDER — CLOBETASOL PROPIONATE 0.5 MG/G
CREAM TOPICAL
COMMUNITY
Start: 2023-02-22 | End: 2023-03-24 | Stop reason: ALTCHOICE

## 2023-02-27 LAB — PATH REPORT PLASRBC-IMP: NORMAL

## 2023-03-02 ENCOUNTER — ANCILLARY PROCEDURE (OUTPATIENT)
Dept: CARDIOLOGY | Age: 78
End: 2023-03-02
Attending: INTERNAL MEDICINE

## 2023-03-02 DIAGNOSIS — I42.1 HOCM (HYPERTROPHIC OBSTRUCTIVE CARDIOMYOPATHY) (CMD): ICD-10-CM

## 2023-03-02 DIAGNOSIS — Z72.0 TOBACCO ABUSE: ICD-10-CM

## 2023-03-02 DIAGNOSIS — I71.21 ASCENDING AORTIC ANEURYSM (CMD): ICD-10-CM

## 2023-03-02 DIAGNOSIS — I48.0 PAF (PAROXYSMAL ATRIAL FIBRILLATION) (CMD): ICD-10-CM

## 2023-03-02 DIAGNOSIS — I48.3 TYPICAL ATRIAL FLUTTER (CMD): ICD-10-CM

## 2023-03-02 DIAGNOSIS — I48.4 ATYPICAL ATRIAL FLUTTER (CMD): ICD-10-CM

## 2023-03-02 LAB
AORTIC VALVE AREA (AVA): 0.83
AORTIC VALVE AREA: 3.09
ASCENDING AORTA (AAD): 3
AV MEAN GRADIENT (AVMG): 6
AV MEAN VELOCITY (AVMV): 1.13
AV PEAK GRADIENT (AVPG): 11
AV PEAK VELOCITY (AVPV): 1.66
AV STENOSIS SEVERITY TEXT: NORMAL
AVI LVOT PEAK GRADIENT (LVOTMG): 1.1
E WAVE DECELARATION TIME (MDT): 17.36
INTERVENTRICULAR SEPTUM IN END DIASTOLE (IVSD): 2.3
LEFT INTERNAL DIMENSION IN SYSTOLE (LVSD): 1.8
LEFT VENTRICULAR INTERNAL DIMENSION IN DIASTOLE (LVDD): 2.6
LEFT VENTRICULAR POSTERIOR WALL IN END DIASTOLE (LVPW): 3.9
LV EF: NORMAL %
LVOT 2D (LVOTD): 28.7
LVOT VTI (LVOTVTI): 1.56
MV E TISSUE VEL LAT (MELV): 0.88
MV E TISSUE VEL MED (MESV): 5.79
MV E WAVE VEL/E TISSUE VEL MED(MSR): 4.78
MV PEAK A VELOCITY (MVPAV): 301
MV PEAK E VELOCITY (MVPEV): 0.59
RV END SYSTOLIC LONGITUDINAL STRAIN FREE WALL (RVGS): 2.4
TRICUSPID VALVE PEAK REGURGITATION VELOCITY (TRPV): 3.8
TV ESTIMATED RIGHT ARTERIAL PRESSURE (RAP): 12.3

## 2023-03-02 PROCEDURE — 93306 TTE W/DOPPLER COMPLETE: CPT | Performed by: INTERNAL MEDICINE

## 2023-03-04 ENCOUNTER — TELEPHONE (OUTPATIENT)
Dept: CARDIOLOGY | Age: 78
End: 2023-03-04

## 2023-03-04 ENCOUNTER — TELEPHONE (OUTPATIENT)
Dept: DERMATOLOGY | Age: 78
End: 2023-03-04

## 2023-03-04 DIAGNOSIS — I71.21 ANEURYSM OF ASCENDING AORTA WITHOUT RUPTURE (CMD): ICD-10-CM

## 2023-03-04 DIAGNOSIS — I42.1 HOCM (HYPERTROPHIC OBSTRUCTIVE CARDIOMYOPATHY) (CMD): Primary | ICD-10-CM

## 2023-03-06 DIAGNOSIS — I42.1 HOCM (HYPERTROPHIC OBSTRUCTIVE CARDIOMYOPATHY) (CMD): Primary | ICD-10-CM

## 2023-03-09 ENCOUNTER — MED REC SCAN ONLY (OUTPATIENT)
Dept: FAMILY MEDICINE CLINIC | Facility: CLINIC | Age: 78
End: 2023-03-09

## 2023-03-19 ASSESSMENT — ENCOUNTER SYMPTOMS
CONSTITUTIONAL NEGATIVE: 1
ALLERGIC/IMMUNOLOGIC NEGATIVE: 1
EYES NEGATIVE: 1
GASTROINTESTINAL NEGATIVE: 1
PSYCHIATRIC NEGATIVE: 1
ENDOCRINE NEGATIVE: 1
HEMATOLOGIC/LYMPHATIC NEGATIVE: 1
NEUROLOGICAL NEGATIVE: 1

## 2023-03-24 ENCOUNTER — OFFICE VISIT (OUTPATIENT)
Dept: CARDIOLOGY | Age: 78
End: 2023-03-24

## 2023-03-24 VITALS
HEIGHT: 70 IN | WEIGHT: 162.81 LBS | OXYGEN SATURATION: 96 % | HEART RATE: 94 BPM | SYSTOLIC BLOOD PRESSURE: 124 MMHG | BODY MASS INDEX: 23.31 KG/M2 | RESPIRATION RATE: 18 BRPM | DIASTOLIC BLOOD PRESSURE: 72 MMHG

## 2023-03-24 DIAGNOSIS — Z72.0 TOBACCO ABUSE: ICD-10-CM

## 2023-03-24 DIAGNOSIS — I71.21 ANEURYSM OF ASCENDING AORTA WITHOUT RUPTURE (CMD): ICD-10-CM

## 2023-03-24 DIAGNOSIS — I48.4 ATYPICAL ATRIAL FLUTTER (CMD): ICD-10-CM

## 2023-03-24 DIAGNOSIS — I48.3 TYPICAL ATRIAL FLUTTER (CMD): ICD-10-CM

## 2023-03-24 DIAGNOSIS — I48.0 PAF (PAROXYSMAL ATRIAL FIBRILLATION) (CMD): ICD-10-CM

## 2023-03-24 DIAGNOSIS — I42.1 HOCM (HYPERTROPHIC OBSTRUCTIVE CARDIOMYOPATHY) (CMD): Primary | ICD-10-CM

## 2023-03-24 PROCEDURE — 93000 ELECTROCARDIOGRAM COMPLETE: CPT | Performed by: INTERNAL MEDICINE

## 2023-03-24 PROCEDURE — 99214 OFFICE O/P EST MOD 30 MIN: CPT | Performed by: INTERNAL MEDICINE

## 2023-03-24 RX ORDER — ASPIRIN 81 MG/1
81 TABLET, CHEWABLE ORAL DAILY
Qty: 90 TABLET | Refills: 3 | Status: SHIPPED | OUTPATIENT
Start: 2023-03-24

## 2023-03-24 RX ORDER — METOPROLOL SUCCINATE 25 MG/1
25 TABLET, EXTENDED RELEASE ORAL DAILY
Qty: 90 TABLET | Refills: 3 | Status: SHIPPED | OUTPATIENT
Start: 2023-03-24

## 2023-03-24 ASSESSMENT — ENCOUNTER SYMPTOMS: RESPIRATORY NEGATIVE: 1

## 2023-03-29 ENCOUNTER — TELEPHONE (OUTPATIENT)
Dept: DERMATOLOGY | Age: 78
End: 2023-03-29

## 2023-04-05 ENCOUNTER — APPOINTMENT (OUTPATIENT)
Dept: DERMATOLOGY | Age: 78
End: 2023-04-05

## 2023-04-12 ENCOUNTER — OFFICE VISIT (OUTPATIENT)
Dept: DERMATOLOGY | Age: 78
End: 2023-04-12

## 2023-04-12 ENCOUNTER — TELEPHONE (OUTPATIENT)
Dept: DERMATOLOGY | Age: 78
End: 2023-04-12

## 2023-04-12 DIAGNOSIS — D23.9 DYSPLASTIC NEVUS: Primary | ICD-10-CM

## 2023-04-12 PROCEDURE — 11402 EXC TR-EXT B9+MARG 1.1-2 CM: CPT | Performed by: DERMATOLOGY

## 2023-04-12 PROCEDURE — 12031 INTMD RPR S/A/T/EXT 2.5 CM/<: CPT | Performed by: DERMATOLOGY

## 2023-04-12 RX ORDER — AZITHROMYCIN 250 MG/1
TABLET, FILM COATED ORAL
Qty: 6 TABLET | Refills: 0 | Status: SHIPPED | OUTPATIENT
Start: 2023-04-12 | End: 2023-04-12 | Stop reason: SDUPTHER

## 2023-04-12 RX ORDER — AZITHROMYCIN 250 MG/1
TABLET, FILM COATED ORAL
Qty: 6 TABLET | Refills: 0 | Status: SHIPPED | OUTPATIENT
Start: 2023-04-12 | End: 2023-04-13 | Stop reason: SDUPTHER

## 2023-04-13 RX ORDER — AZITHROMYCIN 250 MG/1
TABLET, FILM COATED ORAL
Qty: 6 TABLET | Refills: 0 | Status: SHIPPED | OUTPATIENT
Start: 2023-04-13

## 2023-04-19 ENCOUNTER — TELEPHONE (OUTPATIENT)
Dept: DERMATOLOGY | Age: 78
End: 2023-04-19

## 2023-04-19 LAB — PATH REPORT PLASRBC-IMP: NORMAL

## 2023-04-26 ENCOUNTER — NURSE ONLY (OUTPATIENT)
Dept: DERMATOLOGY | Age: 78
End: 2023-04-26

## 2023-04-26 DIAGNOSIS — Z48.02 VISIT FOR SUTURE REMOVAL: Primary | ICD-10-CM

## 2023-05-24 ENCOUNTER — OFFICE VISIT (OUTPATIENT)
Dept: DERMATOLOGY | Age: 78
End: 2023-05-24

## 2023-05-24 DIAGNOSIS — I83.90 ASYMPTOMATIC VARICOSE VEINS: Primary | ICD-10-CM

## 2023-05-24 RX ORDER — AZITHROMYCIN 250 MG/1
TABLET, FILM COATED ORAL
Qty: 6 TABLET | Refills: 0 | Status: SHIPPED | OUTPATIENT
Start: 2023-05-24

## 2023-06-08 ENCOUNTER — NURSE ONLY (OUTPATIENT)
Dept: DERMATOLOGY | Age: 78
End: 2023-06-08

## 2023-06-08 ENCOUNTER — TELEPHONE (OUTPATIENT)
Dept: DERMATOLOGY | Age: 78
End: 2023-06-08

## 2023-06-08 DIAGNOSIS — Z48.02 VISIT FOR SUTURE REMOVAL: Primary | ICD-10-CM

## 2024-01-23 ENCOUNTER — PATIENT OUTREACH (OUTPATIENT)
Dept: FAMILY MEDICINE CLINIC | Facility: CLINIC | Age: 79
End: 2024-01-23

## 2024-03-08 ENCOUNTER — OFFICE VISIT (OUTPATIENT)
Dept: FAMILY MEDICINE CLINIC | Facility: CLINIC | Age: 79
End: 2024-03-08
Payer: MEDICARE

## 2024-03-08 VITALS
SYSTOLIC BLOOD PRESSURE: 106 MMHG | HEART RATE: 50 BPM | DIASTOLIC BLOOD PRESSURE: 64 MMHG | TEMPERATURE: 98 F | OXYGEN SATURATION: 93 % | WEIGHT: 172 LBS | RESPIRATION RATE: 16 BRPM | BODY MASS INDEX: 25 KG/M2

## 2024-03-08 DIAGNOSIS — I42.2 HYPERTROPHIC CARDIOMYOPATHY (HCC): ICD-10-CM

## 2024-03-08 DIAGNOSIS — S16.1XXA STRAIN OF NECK MUSCLE, INITIAL ENCOUNTER: ICD-10-CM

## 2024-03-08 DIAGNOSIS — I48.0 PAF (PAROXYSMAL ATRIAL FIBRILLATION) (HCC): ICD-10-CM

## 2024-03-08 DIAGNOSIS — Z00.00 MEDICARE ANNUAL WELLNESS VISIT, SUBSEQUENT: Primary | ICD-10-CM

## 2024-03-08 DIAGNOSIS — J43.9 PULMONARY EMPHYSEMA, UNSPECIFIED EMPHYSEMA TYPE (HCC): ICD-10-CM

## 2024-03-08 DIAGNOSIS — I70.0 ABDOMINAL AORTIC ATHEROSCLEROSIS (HCC): ICD-10-CM

## 2024-03-08 DIAGNOSIS — I71.21 ANEURYSM OF ASCENDING AORTA WITHOUT RUPTURE (HCC): ICD-10-CM

## 2024-03-08 DIAGNOSIS — Z12.5 PROSTATE CANCER SCREENING: ICD-10-CM

## 2024-03-08 LAB
ANION GAP SERPL CALC-SCNC: 2 MMOL/L (ref 0–18)
AST SERPL-CCNC: 13 U/L (ref 15–37)
BUN BLD-MCNC: 13 MG/DL (ref 9–23)
CALCIUM BLD-MCNC: 9 MG/DL (ref 8.5–10.1)
CHLORIDE SERPL-SCNC: 108 MMOL/L (ref 98–112)
CHOLEST SERPL-MCNC: 194 MG/DL (ref ?–200)
CO2 SERPL-SCNC: 29 MMOL/L (ref 21–32)
COMPLEXED PSA SERPL-MCNC: 0.42 NG/ML (ref ?–4)
CREAT BLD-MCNC: 1.27 MG/DL
EGFRCR SERPLBLD CKD-EPI 2021: 59 ML/MIN/1.73M2 (ref 60–?)
ERYTHROCYTE [DISTWIDTH] IN BLOOD BY AUTOMATED COUNT: 13.1 %
FASTING PATIENT LIPID ANSWER: YES
FASTING STATUS PATIENT QL REPORTED: YES
GLUCOSE BLD-MCNC: 90 MG/DL (ref 70–99)
HCT VFR BLD AUTO: 42 %
HDLC SERPL-MCNC: 62 MG/DL (ref 40–59)
HGB BLD-MCNC: 14.4 G/DL
LDLC SERPL CALC-MCNC: 113 MG/DL (ref ?–100)
MCH RBC QN AUTO: 32.7 PG (ref 26–34)
MCHC RBC AUTO-ENTMCNC: 34.3 G/DL (ref 31–37)
MCV RBC AUTO: 95.5 FL
NONHDLC SERPL-MCNC: 132 MG/DL (ref ?–130)
OSMOLALITY SERPL CALC.SUM OF ELEC: 288 MOSM/KG (ref 275–295)
PLATELET # BLD AUTO: 229 10(3)UL (ref 150–450)
POTASSIUM SERPL-SCNC: 4.7 MMOL/L (ref 3.5–5.1)
RBC # BLD AUTO: 4.4 X10(6)UL
SODIUM SERPL-SCNC: 139 MMOL/L (ref 136–145)
TRIGL SERPL-MCNC: 104 MG/DL (ref 30–149)
TSI SER-ACNC: 1.56 MIU/ML (ref 0.36–3.74)
VLDLC SERPL CALC-MCNC: 18 MG/DL (ref 0–30)
WBC # BLD AUTO: 8.3 X10(3) UL (ref 4–11)

## 2024-03-08 PROCEDURE — 84443 ASSAY THYROID STIM HORMONE: CPT | Performed by: FAMILY MEDICINE

## 2024-03-08 PROCEDURE — 84450 TRANSFERASE (AST) (SGOT): CPT | Performed by: FAMILY MEDICINE

## 2024-03-08 PROCEDURE — 80048 BASIC METABOLIC PNL TOTAL CA: CPT | Performed by: FAMILY MEDICINE

## 2024-03-08 PROCEDURE — 80061 LIPID PANEL: CPT | Performed by: FAMILY MEDICINE

## 2024-03-08 PROCEDURE — 85027 COMPLETE CBC AUTOMATED: CPT | Performed by: FAMILY MEDICINE

## 2024-03-08 NOTE — PROGRESS NOTES
Enoch Walker is a 78 year old male.    CC:    Chief Complaint   Patient presents with    Physical     MAW       HPI:  Yearly PX    Last PSA:   Recent Results (from the past 906217 hour(s))   PSA Screen    Collection Time: 02/22/23  8:50 AM   Result Value Ref Range    Prostate Specific Antigen Screen 0.41 <=4.00 ng/mL     *Note: Due to a large number of results and/or encounters for the requested time period, some results have not been displayed. A complete set of results can be found in Results Review.        Last lipid:  Lab Results   Component Value Date    CHOLEST 184 02/22/2023    TRIG 91 02/22/2023    HDL 64 (H) 02/22/2023     (H) 02/22/2023    VLDL 15 02/22/2023    TCHDLRATIO 2.76 12/15/2017    NONHDLC 120 02/22/2023       Last Colon Cancer screening: Colonoscopy 04/06/2022 to be repeated 2027      Immunization History   Administered Date(s) Administered    HIGH DOSE FLU 65 YRS AND OLDER PRSV FREE SINGLE D (96924) FLU CLINIC 12/14/2016    Pneumococcal (Prevnar 13) 12/15/2017    Pneumovax 23 12/05/2012, 12/14/2016    TD 12/01/2010       Patient Active Problem List   Diagnosis    Ascending aortic aneurysm (HCC): BB, Followed by Cardiology, no chest pain    Hypertrophic obstructive cardiomyopathy (HCC): BB, Followed by Cardiology    PAF (paroxysmal atrial fibrillation) (HCC): BB, ASA, S/p ablation, followed by Cardiology    Abdominal aortic atherosclerosis (HCC): BB, Followed by Cardiology, no abdominal pain    Emphysema lung (HCC): Still smoking, no inhalers, denies SOB or excess mucous      He fell 3 days ago after mis judging a step. Hit his head. No LOC. Notes some posterior neck pain that is non radiating. Did see his Chiro for therapy which helped. No arm/leg weakness.     Interested in a smoking cessation pill. His son is taking one that seems to help. Enoch does not know the name of the medication.    General Health     In the past six months, have you lost more than 10 pounds without  trying?: 2 - No    Has your appetite been poor?: No    Type of Diet: Balanced    How does the patient maintain a good energy level?: Appropriate Exercise    How would you describe your daily physical activity?: Moderate    How would you describe your current health state?: Good    How do you maintain positive mental well-being?: Social Interaction;Visiting Friends         Have you had any immunizations at another office such as Influenza, Hepatitis B, Tetanus, or Pneumococcal?: No     Functional Ability     Bathing or Showering: Able without help    Toileting: Able without help    Dressing: Able without help    Eating: Able without help    Driving: Able without help    Preparing your meals: Able without help    Managing money/bills: Able without help    Taking medications as prescribed: Able without help    Are you able to afford your medications?: Yes    Hearing Problems?: No     Functional Status     Hearing Problems?: No    Vision Problems? : No    Difficulty walking?: No    Difficulty dressing or bathing?: No    Problems with daily activities? : No    Memory Problems?: No      Fall Screen: See Flow sheet  Fall Risk Assessment:   He has been screened for Falls and is High Risk. Fall Prevention information provided to patient in After Visit Summary.    Do you feel unsteady when standing or walking?: No  Do you worry about falling?: Yes  Have you fallen in the past year?: Yes  How many times have you fallen?: 1  Were you injured?: Yes       PHQ-2 SCORE: 0  , done 3/8/2024           Advance Directives     Do you have a healthcare power of ?: Yes    Do you have a living will?: Yes     Hearing Assessment (Required for AWV/SWV)      Hearing Screening    Time taken: 3/8/2024  8:06 AM  Screening Method: Finger Rub  Finger Rub Result: Pass               Visual Acuity     Right Eye Visual Acuity: Corrected Left Eye Visual Acuity: Corrected   Right Eye Chart Acuity: 20/20 Left Eye Chart Acuity: 20/20     Cognitive  Assessment     What day of the week is this?: Correct    What month is it?: Correct    What year is it?: Correct    Recall \"Ball\": Correct    Recall \"Flag\": Correct    Recall \"Tree\": Incorrect          Allergies:  Allergies   Allergen Reactions    Demerol Hcl [Meperidine] HIVES    Penicillins UNKNOWN and OTHER (SEE COMMENTS)     Other reaction(s): Other - See Comments  Unknown        Current Meds:  Current Outpatient Medications   Medication Sig Dispense Refill    Multiple Vitamin (MULTIVITAMIN ADULT OR) Take by mouth.      aspirin 81 MG Oral Chew Tab Chew 81 mg by mouth daily. 1 qd      Metoprolol Succinate ER 25 MG Oral Tablet 24 Hr Take 25 mg by mouth daily.          History:  Past Medical History:   Diagnosis Date    Abdominal aortic atherosclerosis (HCC)     With mild dilation. 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 Fib/flutter    COLONOSCOPY      Li report 3/2010--tics and polyps    COLONOSCOPY N/A 1/25/2017    Procedure: COLONOSCOPY, POSSIBLE BIOPSY, POSSIBLE POLYPECTOMY 28180;  Surgeon: Lalito Benton DO;  Location: [facility] INTRACARD CATH ABLATION ARRHYTHMIA  01/2019    Dr. Denis Hurt, [facility]    TONSILLECTOMY         No family history on file.   Family Status   Relation Status    Mo     Fa       Social History     Socioeconomic History    Marital status:    Tobacco Use    Smoking status: Every Day     Packs/day: 1.00     Years: 30.00     Additional pack years: 0.00     Total pack years: 30.00     Types: Cigarettes    Smokeless tobacco: Never   Vaping Use    Vaping Use: Never used   Substance and Sexual Activity    Alcohol use: Yes     Comment: 6 beers/day    Drug use: No        ROS:  General: energy level stable  Cardiovascular/Pulses: Denies palpitations, exertional chest pain or pressure  Respiratory: Denies cough, dyspnea, sputum, wheezing  GI: Denies hematochezia   (male): Denies hematuria    Vitals: /64 (BP Location: Left arm, Patient Position: Sitting, Cuff Size: adult)   Pulse 50   Temp 97.6 °F (36.4 °C)   Resp 16   Wt 172 lb (78 kg)   SpO2 93%   BMI 25.04 kg/m²    Reviewed by STELLA Powell M.D.    Physical Exam:  GEN: well developed, well nourished, in no apparent distress  EYE: B conjunctiva and lids normal  HENT: normocephalic; normal nose, pharynx and TM's  NECK: No lymphadenopathy, thyromegaly or masses  CAR: S1, S2 normal, RRR; no S3, no S4; no click; murmur negative  PULM: clear to auscultation B, no accessory muscle use  GI: not examined  PSYCH: alert and oriented x 3; affect appropriate  SKIN: superficial abrasion above the lateral L eyebrow   EXTREMITIES: No clubbing, cyanosis or edema  GENITAL: not examined  LYMPH: no supraclavicular nodes  MS: No cervical spine tenderness, + B para cervical spasm  NEURO: Awake and alert. Normal speech and articulation. No facial droop or asymmetry. Moving all extremities equally. Symmetric B patellar DTRs    ASSESSMENT AND PLAN    1. Medicare annual wellness visit, subsequent  Can be done with colon cancer screen  He defers PT for fall prevention  Recommended Td at local pharmacy. He can also have COVID, Shingles and RSV vaccines at local  pharmacy if desired    - VENIPUNCTURE  - AST (SGOT); Future  - Basic Metabolic Panel (8); Future  - PSA Total, Screen; Future  - CBC, Platelet; No Differential; Future  - Lipid Panel; Future  - TSH W Reflex To Free T4; Future    2. Aneurysm of ascending aorta without rupture (HCC)  Stable   Continue present medications   Continue to follow with Cardiology    3. PAF (paroxysmal atrial fibrillation) (HCC)  Currently in sinus  Stable   Continue present medications   Continue to follow with Cardiology    4. Abdominal aortic atherosclerosis (HCC)  Stable   Continue present medications   Continue to follow with Cardiology    5. Pulmonary emphysema, unspecified emphysema type (HCC)  Encouraged smoking cessation  He can contact me with the name of the medication his son is using for smoking cessation    6. Hypertrophic cardiomyopathy (HCC)  Stable   Continue present medications   Continue to follow with Cardiology    7. Prostate cancer screening    - PSA Total, Screen; Future    8. Strain of neck muscle, initial encounter  He will alternate heat and ice  He can contact me for PT if desired.       No follow-ups on file.    Orders for this visit:    Orders Placed This Encounter   Procedures    AST (SGOT)     Standing Status:   Future     Standing Expiration Date:   3/8/2025    Basic Metabolic Panel (8)     Standing Status:   Future     Standing Expiration Date:   3/8/2025    PSA Total, Screen     Standing Status:   Future     Standing Expiration Date:   3/8/2025    CBC, Platelet; No Differential     Standing Status:   Future     Standing Expiration Date:   3/8/2025    Lipid Panel     Standing Status:   Future     Standing Expiration Date:   3/8/2025    TSH W Reflex To Free T4     Standing Status:   Future     Standing Expiration Date:   3/8/2025    VENIPUNCTURE     Order Specific Question:   Release to patient     Answer:   Immediate       None    Meds & Refills for this Visit:  Requested Prescriptions      No prescriptions  requested or ordered in this encounter             Authorized by Joseph Powell M.D.

## 2024-03-12 ENCOUNTER — TELEPHONE (OUTPATIENT)
Dept: FAMILY MEDICINE CLINIC | Facility: CLINIC | Age: 79
End: 2024-03-12

## 2024-03-12 NOTE — TELEPHONE ENCOUNTER
Please forward his lab results (most recent) to Rubin Dawn out of [facility]. She did not have the fax#. ph [phone] , [address].

## 2024-03-13 ENCOUNTER — APPOINTMENT (OUTPATIENT)
Dept: CARDIOLOGY | Age: 79
End: 2024-03-13
Attending: INTERNAL MEDICINE

## 2024-03-13 DIAGNOSIS — I42.1 HOCM (HYPERTROPHIC OBSTRUCTIVE CARDIOMYOPATHY) (CMD): ICD-10-CM

## 2024-03-13 LAB
AORTIC VALVE AREA (AVA): 0.95
AORTIC VALVE AREA: 3.93
ASCENDING AORTA (AAD): 3
AV MEAN GRADIENT (AVMG): 4.1
AV MEAN VELOCITY (AVMV): 0.93
AV PEAK GRADIENT (AVPG): 7.6
AV PEAK VELOCITY (AVPV): 1.38
AV STENOSIS SEVERITY TEXT: NORMAL
AVI LVOT PEAK GRADIENT (LVOTMG): 0.9
E WAVE DECELARATION TIME (MDT): 17.54
INTERVENTRICULAR SEPTUM IN END DIASTOLE (IVSD): 2.38
LEFT INTERNAL DIMENSION IN SYSTOLE (LVSD): 1.9
LEFT VENTRICULAR INTERNAL DIMENSION IN DIASTOLE (LVDD): 1.9
LEFT VENTRICULAR POSTERIOR WALL IN END DIASTOLE (LVPW): 4
LV EF: NORMAL %
LVOT 2D (LVOTD): 37.2
LVOT VTI (LVOTVTI): 1.74
MV E TISSUE VEL LAT (MELV): 0.87
MV E TISSUE VEL MED (MESV): 8.49
MV E WAVE VEL/E TISSUE VEL MED(MSR): 5.44
MV PEAK A VELOCITY (MVPAV): 264
MV PEAK E VELOCITY (MVPEV): 0.61
RV END SYSTOLIC LONGITUDINAL STRAIN FREE WALL (RVGS): 2.2
TRICUSPID VALVE ANNULAR PEAK VELOCITY (TVAPV): 24
TRICUSPID VALVE PEAK REGURGITATION VELOCITY (TRPV): 3.8

## 2024-03-13 PROCEDURE — 93306 TTE W/DOPPLER COMPLETE: CPT | Performed by: INTERNAL MEDICINE

## 2024-03-20 ENCOUNTER — TELEPHONE (OUTPATIENT)
Dept: CARDIOLOGY | Age: 79
End: 2024-03-20

## 2024-03-20 PROBLEM — J43.9 EMPHYSEMA LUNG  (CMD): Status: ACTIVE | Noted: 2024-03-08

## 2024-03-20 PROBLEM — I70.0 ABDOMINAL AORTIC ATHEROSCLEROSIS (CMD): Status: ACTIVE | Noted: 2024-03-08

## 2024-03-20 ASSESSMENT — ENCOUNTER SYMPTOMS
EYES NEGATIVE: 1
ENDOCRINE NEGATIVE: 1
ALLERGIC/IMMUNOLOGIC NEGATIVE: 1
HEMATOLOGIC/LYMPHATIC NEGATIVE: 1
NEUROLOGICAL NEGATIVE: 1
RESPIRATORY NEGATIVE: 1
GASTROINTESTINAL NEGATIVE: 1
CONSTITUTIONAL NEGATIVE: 1
PSYCHIATRIC NEGATIVE: 1

## 2024-03-21 ENCOUNTER — APPOINTMENT (OUTPATIENT)
Dept: CARDIOLOGY | Age: 79
End: 2024-03-21

## 2024-03-21 VITALS
OXYGEN SATURATION: 98 % | RESPIRATION RATE: 18 BRPM | DIASTOLIC BLOOD PRESSURE: 60 MMHG | BODY MASS INDEX: 24.31 KG/M2 | HEART RATE: 70 BPM | WEIGHT: 169.8 LBS | SYSTOLIC BLOOD PRESSURE: 126 MMHG | HEIGHT: 70 IN

## 2024-03-21 DIAGNOSIS — I48.3 TYPICAL ATRIAL FLUTTER (CMD): Primary | ICD-10-CM

## 2024-03-21 DIAGNOSIS — Z72.0 TOBACCO ABUSE: ICD-10-CM

## 2024-03-21 DIAGNOSIS — I48.4 ATYPICAL ATRIAL FLUTTER (CMD): ICD-10-CM

## 2024-03-21 DIAGNOSIS — I42.1 HOCM (HYPERTROPHIC OBSTRUCTIVE CARDIOMYOPATHY) (CMD): ICD-10-CM

## 2024-03-21 DIAGNOSIS — I71.21 ANEURYSM OF ASCENDING AORTA WITHOUT RUPTURE (CMD): ICD-10-CM

## 2024-03-21 DIAGNOSIS — I48.0 PAF (PAROXYSMAL ATRIAL FIBRILLATION) (CMD): ICD-10-CM

## 2024-03-21 RX ORDER — VARENICLINE TARTRATE 0.5 (11)-1
KIT ORAL
Qty: 90 EACH | Refills: 3 | Status: SHIPPED | OUTPATIENT
Start: 2024-03-21 | End: 2024-04-27

## 2024-03-21 RX ORDER — METOPROLOL SUCCINATE 25 MG/1
25 TABLET, EXTENDED RELEASE ORAL DAILY
Qty: 90 TABLET | Refills: 3 | Status: SHIPPED | OUTPATIENT
Start: 2024-03-21

## 2024-03-21 RX ORDER — BUPROPION HYDROCHLORIDE 150 MG/1
150 TABLET ORAL EVERY MORNING
Qty: 90 TABLET | Refills: 0 | Status: SHIPPED | OUTPATIENT
Start: 2024-03-21

## 2024-03-21 NOTE — TELEPHONE ENCOUNTER
SPOUSE CALLED AND ADV THAT THE MEDICATION THAT PT WAS SUPPOSE TO REQUEST TO SEE IF DR WOULD PRESCRIBE   TO STOP SMOKING IS:    BUPROPION  MG    PLEASE ADV    THANK YOU

## 2024-05-17 ENCOUNTER — TELEPHONE (OUTPATIENT)
Dept: CARDIOLOGY | Age: 79
End: 2024-05-17

## 2024-06-05 ENCOUNTER — TELEPHONE (OUTPATIENT)
Dept: CARDIOLOGY | Age: 79
End: 2024-06-05

## 2024-06-05 DIAGNOSIS — R91.1 LUNG NODULE: ICD-10-CM

## 2024-06-05 DIAGNOSIS — Z72.0 TOBACCO ABUSE: Primary | ICD-10-CM

## 2024-06-05 DIAGNOSIS — Z87.891 PERSONAL HISTORY OF NICOTINE DEPENDENCE: ICD-10-CM

## 2024-06-14 ENCOUNTER — APPOINTMENT (OUTPATIENT)
Dept: CT IMAGING | Age: 79
End: 2024-06-14
Attending: INTERNAL MEDICINE

## 2024-06-14 DIAGNOSIS — Z87.891 PERSONAL HISTORY OF NICOTINE DEPENDENCE: ICD-10-CM

## 2024-06-14 DIAGNOSIS — R91.1 LUNG NODULE: ICD-10-CM

## 2024-06-14 DIAGNOSIS — Z72.0 TOBACCO ABUSE: ICD-10-CM

## 2024-06-14 PROCEDURE — 71271 CT THORAX LUNG CANCER SCR C-: CPT | Performed by: RADIOLOGY

## 2024-09-12 ENCOUNTER — APPOINTMENT (OUTPATIENT)
Dept: DERMATOLOGY | Age: 79
End: 2024-09-12

## 2024-09-12 DIAGNOSIS — L57.0 AK (ACTINIC KERATOSIS): Primary | ICD-10-CM

## 2024-10-28 ENCOUNTER — HOSPITAL ENCOUNTER (EMERGENCY)
Age: 79
Discharge: HOME | End: 2024-10-28
Attending: EMERGENCY MEDICINE
Payer: MEDICARE

## 2024-10-28 ENCOUNTER — APPOINTMENT (OUTPATIENT)
Dept: GENERAL RADIOLOGY | Age: 79
End: 2024-10-28
Attending: EMERGENCY MEDICINE
Payer: MEDICARE

## 2024-10-28 VITALS
WEIGHT: 170 LBS | SYSTOLIC BLOOD PRESSURE: 107 MMHG | TEMPERATURE: 99 F | HEIGHT: 70 IN | OXYGEN SATURATION: 97 % | RESPIRATION RATE: 20 BRPM | HEART RATE: 75 BPM | BODY MASS INDEX: 24.34 KG/M2 | DIASTOLIC BLOOD PRESSURE: 69 MMHG

## 2024-10-28 DIAGNOSIS — I48.91 ATRIAL FIBRILLATION WITH RAPID VENTRICULAR RESPONSE (HCC): Primary | ICD-10-CM

## 2024-10-28 LAB
ALBUMIN SERPL-MCNC: 3.7 G/DL (ref 3.4–5)
ALBUMIN/GLOB SERPL: 1.1 {RATIO} (ref 1–2)
ALP LIVER SERPL-CCNC: 60 U/L
ALT SERPL-CCNC: 23 U/L
ANION GAP SERPL CALC-SCNC: 10 MMOL/L (ref 0–18)
APTT PPP: 30.9 SECONDS (ref 23–36)
AST SERPL-CCNC: 21 U/L (ref 15–37)
BASOPHILS # BLD AUTO: 0.07 X10(3) UL (ref 0–0.2)
BASOPHILS NFR BLD AUTO: 0.6 %
BILIRUB SERPL-MCNC: 0.5 MG/DL (ref 0.1–2)
BUN BLD-MCNC: 19 MG/DL (ref 9–23)
CALCIUM BLD-MCNC: 9.4 MG/DL (ref 8.5–10.1)
CHLORIDE SERPL-SCNC: 102 MMOL/L (ref 98–112)
CO2 SERPL-SCNC: 22 MMOL/L (ref 21–32)
CREAT BLD-MCNC: 1.17 MG/DL
EGFRCR SERPLBLD CKD-EPI 2021: 65 ML/MIN/1.73M2 (ref 60–?)
EOSINOPHIL # BLD AUTO: 0.2 X10(3) UL (ref 0–0.7)
EOSINOPHIL NFR BLD AUTO: 1.8 %
ERYTHROCYTE [DISTWIDTH] IN BLOOD BY AUTOMATED COUNT: 13.1 %
GLOBULIN PLAS-MCNC: 3.3 G/DL (ref 2.8–4.4)
GLUCOSE BLD-MCNC: 97 MG/DL (ref 70–99)
HCT VFR BLD AUTO: 39.8 %
HGB BLD-MCNC: 13.8 G/DL
IMM GRANULOCYTES # BLD AUTO: 0.03 X10(3) UL (ref 0–1)
IMM GRANULOCYTES NFR BLD: 0.3 %
LYMPHOCYTES # BLD AUTO: 2.22 X10(3) UL (ref 1–4)
LYMPHOCYTES NFR BLD AUTO: 20.4 %
MCH RBC QN AUTO: 33.9 PG (ref 26–34)
MCHC RBC AUTO-ENTMCNC: 34.7 G/DL (ref 31–37)
MCV RBC AUTO: 97.8 FL
MONOCYTES # BLD AUTO: 0.82 X10(3) UL (ref 0.1–1)
MONOCYTES NFR BLD AUTO: 7.5 %
NEUTROPHILS # BLD AUTO: 7.54 X10 (3) UL (ref 1.5–7.7)
NEUTROPHILS # BLD AUTO: 7.54 X10(3) UL (ref 1.5–7.7)
NEUTROPHILS NFR BLD AUTO: 69.4 %
OSMOLALITY SERPL CALC.SUM OF ELEC: 280 MOSM/KG (ref 275–295)
PLATELET # BLD AUTO: 209 10(3)UL (ref 150–450)
POTASSIUM SERPL-SCNC: 3.9 MMOL/L (ref 3.5–5.1)
PROT SERPL-MCNC: 7 G/DL (ref 6.4–8.2)
RBC # BLD AUTO: 4.07 X10(6)UL
SODIUM SERPL-SCNC: 134 MMOL/L (ref 136–145)
TROPONIN I SERPL HS-MCNC: 18 NG/L
WBC # BLD AUTO: 10.9 X10(3) UL (ref 4–11)

## 2024-10-28 PROCEDURE — 85730 THROMBOPLASTIN TIME PARTIAL: CPT | Performed by: EMERGENCY MEDICINE

## 2024-10-28 PROCEDURE — 93005 ELECTROCARDIOGRAM TRACING: CPT

## 2024-10-28 PROCEDURE — 99291 CRITICAL CARE FIRST HOUR: CPT

## 2024-10-28 PROCEDURE — 85025 COMPLETE CBC W/AUTO DIFF WBC: CPT | Performed by: EMERGENCY MEDICINE

## 2024-10-28 PROCEDURE — 93010 ELECTROCARDIOGRAM REPORT: CPT

## 2024-10-28 PROCEDURE — 96366 THER/PROPH/DIAG IV INF ADDON: CPT

## 2024-10-28 PROCEDURE — 96365 THER/PROPH/DIAG IV INF INIT: CPT

## 2024-10-28 PROCEDURE — 80053 COMPREHEN METABOLIC PANEL: CPT | Performed by: EMERGENCY MEDICINE

## 2024-10-28 PROCEDURE — 71045 X-RAY EXAM CHEST 1 VIEW: CPT | Performed by: EMERGENCY MEDICINE

## 2024-10-28 PROCEDURE — 84484 ASSAY OF TROPONIN QUANT: CPT | Performed by: EMERGENCY MEDICINE

## 2024-10-28 PROCEDURE — 99285 EMERGENCY DEPT VISIT HI MDM: CPT

## 2024-10-28 RX ORDER — METOPROLOL TARTRATE 1 MG/ML
5 INJECTION, SOLUTION INTRAVENOUS ONCE
Status: DISCONTINUED | OUTPATIENT
Start: 2024-10-28 | End: 2024-10-28

## 2024-10-28 RX ORDER — HEPARIN SODIUM AND DEXTROSE 10000; 5 [USP'U]/100ML; G/100ML
INJECTION INTRAVENOUS CONTINUOUS
Status: DISCONTINUED | OUTPATIENT
Start: 2024-10-29 | End: 2024-10-28

## 2024-10-28 RX ORDER — HEPARIN SODIUM AND DEXTROSE 10000; 5 [USP'U]/100ML; G/100ML
12 INJECTION INTRAVENOUS ONCE
Status: COMPLETED | OUTPATIENT
Start: 2024-10-28 | End: 2024-10-28

## 2024-10-28 RX ORDER — HEPARIN SODIUM 1000 [USP'U]/ML
60 INJECTION, SOLUTION INTRAVENOUS; SUBCUTANEOUS ONCE
Status: COMPLETED | OUTPATIENT
Start: 2024-10-28 | End: 2024-10-28

## 2024-10-29 ENCOUNTER — TELEPHONE (OUTPATIENT)
Dept: CARDIOLOGY | Age: 79
End: 2024-10-29

## 2024-10-29 DIAGNOSIS — I48.0 PAF (PAROXYSMAL ATRIAL FIBRILLATION)  (CMD): Primary | ICD-10-CM

## 2024-10-29 LAB
ATRIAL RATE: 73 BPM
P AXIS: 91 DEGREES
P-R INTERVAL: 172 MS
Q-T INTERVAL: 330 MS
Q-T INTERVAL: 432 MS
QRS DURATION: 104 MS
QRS DURATION: 98 MS
QTC CALCULATION (BEZET): 475 MS
QTC CALCULATION (BEZET): 541 MS
R AXIS: 29 DEGREES
R AXIS: 46 DEGREES
T AXIS: 30 DEGREES
T AXIS: 50 DEGREES
VENTRICULAR RATE: 162 BPM
VENTRICULAR RATE: 73 BPM

## 2024-10-29 NOTE — ED QUICK NOTES
Patient converted back to NSR, HR 75. MD notified. Order given for repeat EKG and to discontinue cardizem and heparin per MD.

## 2024-10-29 NOTE — ED PROVIDER NOTES
Patient Seen in: [facility] [employer]      History     Chief Complaint   Patient presents with    Arrythmia/Palpitations     Stated Complaint: irregular heart beat    Subjective:   HPI      78-year-old male who has a history of having atrial fibrillation who has had an ablation whose last bout he believes that was consistent was almost 2 years ago.  He says had short momentary bout rethought he went back into A-fib but will come out very quickly.  Today at about 5:30 PM he says has been persistently in A-fib he believes since that time.  After working outside he noticed he started to feel the quickening of his heartbeat as well as some chest pressure with that.  He felt dizzy as well.  He denies any headaches.  He denies any nausea or vomiting.  He did feel short of breath.  Denies any abdominal pains.  Is denying any other complaints at this time.  He does take metoprolol but is not on any other anticoagulants since the ablation.  He does take a baby aspirin daily.    Objective:     No pertinent past medical history.            No pertinent past surgical history.              No pertinent social history.                Physical Exam     ED Triage Vitals [10/28/24 1907]   /77   Pulse (!) 139   Resp 15   Temp 97.6 °F (36.4 °C)   Temp src Oral   SpO2 100 %   O2 Device None (Room air)       Current Vitals:   Vital Signs  BP: 94/85  Pulse: 116  Resp: 13  Temp: 97.6 °F (36.4 °C)  Temp src: Oral    Oxygen Therapy  SpO2: 96 %  O2 Device: None (Room air)        Physical Exam  HEENT : NCAT, EOMI, PEERL,  neck supple, no JVD, trachea midline, No LAD  Heart: S1S2 normal. No murmurs, irregular rate and rhythm, tachycardia  Lungs: Clear to auscultation bilaterally  Abdomen: Soft nontender nondistended normal active bowel sounds without rebound, guarding or masses noted  Back nontender without CVA tenderness  Extremity no clubbing, cyanosis or edema noted.  Full range of motion noted without  tenderness  Neuro: No focal deficits noted    All measures to prevent infection transmission during my interaction with the patient were taken.  The patient was already wearing droplet mask on my arrival to the room.  Personal protective equipment including a droplet mask as well as gloves were worn throughout the duration of my exam.  Hand washing was performed prior to and after the exam.  Stethoscope and equipment used during my examination was cleaned with a super Sani cloth germicidal wipe following the exam.    ED Course     Labs Reviewed   COMP METABOLIC PANEL (14) - Abnormal; Notable for the following components:       Result Value    Sodium 134 (*)     All other components within normal limits   TROPONIN I HIGH SENSITIVITY - Normal   PTT, ACTIVATED - Normal   CBC WITH DIFFERENTIAL WITH PLATELET   RAINBOW DRAW LAVENDER   RAINBOW DRAW LIGHT GREEN   RAINBOW DRAW BLUE     EKG    Rate, intervals and axes as noted on EKG Report.  Rate: 162  Rhythm: Atrial Fibrillation  Reading: QRS of 104, patient is atrial fibrillation with a rapid ventricular response incomplete right bundle branch block         Second EKG  EKG    Rate, intervals and axes as noted on EKG Report.  Rate: 73  Rhythm: Sinus Rhythm  Reading: , QRS of 90, patient is a normal sinus rhythm noted some without ischemic change.      ED Course as of 10/28/24 2001  ------------------------------------------------------------  Time: 10/28 1953  Comment: Will have the patient's CMP was negative.  His initial troponin was normal.  His CBC was unremarkable.  He was started on a Cardizem bolus and drip.  He did have a bit of a borderline low blood pressure with this so IV fluid as a bolus was given.  Heparin was started the patient's case was discussed with cardiology as well as cardiology patient admitted for further management.  The patient a chest x-ray done that I person interpreted showed no acute cardiopulmonary process.  I reviewed the radiology  report as well.       The patient while here did convert to a normal sinus rhythm      XR CHEST AP PORTABLE  (CPT=71045)    Result Date: 10/28/2024  PROCEDURE:  XR CHEST AP PORTABLE  (CPT=71045)  TECHNIQUE:  AP chest radiograph was obtained.  COMPARISON:  [facility], XR, XR RIBS WITH CHEST (3 VIEWS), LEFT  (CPT=71101), 1/26/2022, 3:42 PM.  INDICATIONS:  irregular heart beat  PATIENT STATED HISTORY: (As transcribed by Technologist)  Sudden onset mid/left side chest pain, started at 17:30 today.    FINDINGS:  No focal consolidation.  No pleural effusion.  No pneumothorax.  No pulmonary edema.  The cardiomediastinal silhouette is within normal limits.  No acute osseous findings.            CONCLUSION:  No acute radiographic findings.   LOCATION:  [facility]      Dictated by (CST): Jarred Evans MD on 10/28/2024 at 7:48 PM     Finalized by (CST): Jarred Evans MD on 10/28/2024 at 7:48 PM        Medications   dilTIAZem 10 mg BOLUS FROM BAG infusion (10 mg Intravenous Bolus from Bag 10/28/24 1915)   dilTIAZem (cardIZEM) 100 mg in sodium chloride 0.9% 100 mL IVPB-ADDV (5 mg/hr Intravenous New Bag 10/28/24 1917)   metoprolol (Lopressor) 5 mg/5mL injection 5 mg (has no administration in time range)   sodium chloride 0.9 % IV bolus 1,000 mL (1,000 mL Intravenous New Bag 10/28/24 2001)   heparin (Porcine) 1000 UNIT/ML injection - BOLUS IV 4,600 Units (has no administration in time range)   heparin (Porcine) 11157 units/250mL infusion ED INITIAL DOSE (has no administration in time range)   heparin (Porcine) 38248 units/250mL infusion ACS/AFIB CONTINUOUS (has no administration in time range)            MDM      Differential diagnosis includes coronary artery insufficiency, atrial fibs, atrial flutter, SVT, and ventricular tachycardia but not limited to these.  The patient here does arrive in rapid atrial fibrillation.  He has had improvement with IV Cardizem given.  He was started on IV heparin as well.  The patient converted back  to normal sinus rhythm at this time we discharged him to follow-up with his cardiologist.      Critical Care Note:  The patient arrived with a condition with significant morbidity and mortality associated. The services I provided  were to promote improvement and reduce mortality specifically involving complex record review, complex medical decisions and interventions, and consultations outside the regular procedures and care normally rendered for 45 minutes of critical care time      This note was prepared using Dragon Medical voice recognition dictation software.  As a result errors may occur.  When identified to these areas have been corrected.  While every attempt is made to correct errors during dictation discrepancies may still exist.  Please contact if there are any errors.    Admission disposition: 10/28/2024  7:58 PM           Medical Decision Making      Disposition and Plan     Clinical Impression:  1. Atrial fibrillation with rapid ventricular response (HCC)         Disposition:  Admit  10/28/2024  7:58 pm    Follow-up:  No follow-up provider specified.        Medications Prescribed:  Current Discharge Medication List              Supplementary Documentation:         Hospital Problems       Present on Admission  Date Reviewed: 3/8/2024            ICD-10-CM Noted POA    * (Principal) Atrial fibrillation with rapid ventricular response (HCC) I48.91 10/28/2024 Unknown

## 2024-10-29 NOTE — ED INITIAL ASSESSMENT (HPI)
Patient reports he was fixing some farm equipment and began feeling lightheaded, states he \"felt an irregular heartbeat\", hx of A.fib and cardiac ablation. Pt denies SOB, talking in complete sentences.

## 2024-10-29 NOTE — ED QUICK NOTES
BP 94/85. MD notified. Order for IVF per MD. \"Just a couple hundred mL, not full bag\" per Dr. Finley.

## 2024-10-29 NOTE — ED QUICK NOTES
Dr. Finley at bedside assessing patient. Patient remains alert and oriented, denies chest pain, denies SOB, stable at this time. Ok to discharge per MD. Patient's wife remains at bedside. Patient and wife verbalized understanding of discharge instructions and to follow up with cardiologist per MD. VSS. Patient up with steady gait.

## 2024-10-29 NOTE — ED QUICK NOTES
Pt states \"The pain is actually gone. I don't feel that ache in my chest anymore.\" Pt remains alert and oriented, stable at this time, requesting to watch Monday night football. Wife remains at bedside.

## 2024-10-31 ENCOUNTER — HOSPITAL ENCOUNTER (INPATIENT)
Facility: HOSPITAL | Age: 79
LOS: 2 days | Discharge: HOME | End: 2024-11-04
Attending: EMERGENCY MEDICINE | Admitting: INTERNAL MEDICINE
Payer: MEDICARE

## 2024-10-31 ENCOUNTER — APPOINTMENT (OUTPATIENT)
Dept: GENERAL RADIOLOGY | Age: 79
End: 2024-10-31
Attending: EMERGENCY MEDICINE
Payer: MEDICARE

## 2024-10-31 ENCOUNTER — APPOINTMENT (OUTPATIENT)
Dept: CV DIAGNOSTICS | Facility: HOSPITAL | Age: 79
End: 2024-10-31
Attending: INTERNAL MEDICINE
Payer: MEDICARE

## 2024-10-31 DIAGNOSIS — R00.0 WIDE-COMPLEX TACHYCARDIA: Primary | ICD-10-CM

## 2024-10-31 LAB
ALBUMIN SERPL-MCNC: 3.5 G/DL (ref 3.4–5)
ALBUMIN/GLOB SERPL: 0.9 {RATIO} (ref 1–2)
ALP LIVER SERPL-CCNC: 63 U/L
ALT SERPL-CCNC: 30 U/L
ANION GAP SERPL CALC-SCNC: 9 MMOL/L (ref 0–18)
APTT PPP: 33.3 SECONDS (ref 23–36)
AST SERPL-CCNC: 21 U/L (ref 15–37)
BASOPHILS # BLD AUTO: 0.09 X10(3) UL (ref 0–0.2)
BASOPHILS NFR BLD AUTO: 0.7 %
BILIRUB SERPL-MCNC: 0.5 MG/DL (ref 0.1–2)
BUN BLD-MCNC: 13 MG/DL (ref 9–23)
CALCIUM BLD-MCNC: 9.2 MG/DL (ref 8.5–10.1)
CHLORIDE SERPL-SCNC: 105 MMOL/L (ref 98–112)
CHOLEST SERPL-MCNC: 171 MG/DL (ref ?–200)
CO2 SERPL-SCNC: 22 MMOL/L (ref 21–32)
CREAT BLD-MCNC: 1.49 MG/DL
EGFRCR SERPLBLD CKD-EPI 2021: 48 ML/MIN/1.73M2 (ref 60–?)
EOSINOPHIL # BLD AUTO: 0.42 X10(3) UL (ref 0–0.7)
EOSINOPHIL NFR BLD AUTO: 3.1 %
ERYTHROCYTE [DISTWIDTH] IN BLOOD BY AUTOMATED COUNT: 13.3 %
EST. AVERAGE GLUCOSE BLD GHB EST-MCNC: 114 MG/DL (ref 68–126)
GLOBULIN PLAS-MCNC: 3.7 G/DL (ref 2.8–4.4)
GLUCOSE BLD-MCNC: 209 MG/DL (ref 70–99)
HBA1C MFR BLD: 5.6 % (ref ?–5.7)
HCT VFR BLD AUTO: 46.7 %
HDLC SERPL-MCNC: 56 MG/DL (ref 40–59)
HGB BLD-MCNC: 16.1 G/DL
IMM GRANULOCYTES # BLD AUTO: 0.07 X10(3) UL (ref 0–1)
IMM GRANULOCYTES NFR BLD: 0.5 %
INR BLD: 1.21 (ref 0.8–1.2)
LDLC SERPL CALC-MCNC: 90 MG/DL (ref ?–100)
LYMPHOCYTES # BLD AUTO: 4.06 X10(3) UL (ref 1–4)
LYMPHOCYTES NFR BLD AUTO: 30.1 %
MCH RBC QN AUTO: 34.4 PG (ref 26–34)
MCHC RBC AUTO-ENTMCNC: 34.5 G/DL (ref 31–37)
MCV RBC AUTO: 99.8 FL
MONOCYTES # BLD AUTO: 1.02 X10(3) UL (ref 0.1–1)
MONOCYTES NFR BLD AUTO: 7.6 %
NEUTROPHILS # BLD AUTO: 7.81 X10 (3) UL (ref 1.5–7.7)
NEUTROPHILS # BLD AUTO: 7.81 X10(3) UL (ref 1.5–7.7)
NEUTROPHILS NFR BLD AUTO: 58 %
NONHDLC SERPL-MCNC: 115 MG/DL (ref ?–130)
OSMOLALITY SERPL CALC.SUM OF ELEC: 288 MOSM/KG (ref 275–295)
PLATELET # BLD AUTO: 291 10(3)UL (ref 150–450)
PLATELETS.RETICULATED NFR BLD AUTO: 2.8 % (ref 0–7)
POTASSIUM SERPL-SCNC: 3.9 MMOL/L (ref 3.5–5.1)
PROT SERPL-MCNC: 7.2 G/DL (ref 6.4–8.2)
PROTHROMBIN TIME: 15.1 SECONDS (ref 11.6–14.8)
RBC # BLD AUTO: 4.68 X10(6)UL
SODIUM SERPL-SCNC: 136 MMOL/L (ref 136–145)
TRIGL SERPL-MCNC: 143 MG/DL (ref 30–149)
TROPONIN I SERPL HS-MCNC: 545 NG/L
TROPONIN I SERPL HS-MCNC: 95 NG/L
TSI SER-ACNC: 3.73 MIU/ML (ref 0.55–4.78)
VLDLC SERPL CALC-MCNC: 23 MG/DL (ref 0–30)
WBC # BLD AUTO: 13.5 X10(3) UL (ref 4–11)

## 2024-10-31 PROCEDURE — 93306 TTE W/DOPPLER COMPLETE: CPT | Performed by: INTERNAL MEDICINE

## 2024-10-31 PROCEDURE — 71045 X-RAY EXAM CHEST 1 VIEW: CPT | Performed by: EMERGENCY MEDICINE

## 2024-10-31 PROCEDURE — 99223 1ST HOSP IP/OBS HIGH 75: CPT | Performed by: INTERNAL MEDICINE

## 2024-10-31 RX ORDER — ONDANSETRON 2 MG/ML
4 INJECTION INTRAMUSCULAR; INTRAVENOUS EVERY 6 HOURS PRN
Status: DISCONTINUED | OUTPATIENT
Start: 2024-10-31 | End: 2024-11-04

## 2024-10-31 RX ORDER — BENZONATATE 100 MG/1
200 CAPSULE ORAL 3 TIMES DAILY PRN
Status: DISCONTINUED | OUTPATIENT
Start: 2024-10-31 | End: 2024-11-04

## 2024-10-31 RX ORDER — ETOMIDATE 2 MG/ML
INJECTION INTRAVENOUS
Status: COMPLETED
Start: 2024-10-31 | End: 2024-10-31

## 2024-10-31 RX ORDER — METOPROLOL SUCCINATE 25 MG/1
25 TABLET, EXTENDED RELEASE ORAL
Status: DISCONTINUED | OUTPATIENT
Start: 2024-11-01 | End: 2024-11-01

## 2024-10-31 RX ORDER — SODIUM PHOSPHATE, DIBASIC AND SODIUM PHOSPHATE, MONOBASIC 7; 19 G/230ML; G/230ML
1 ENEMA RECTAL ONCE AS NEEDED
Status: DISCONTINUED | OUTPATIENT
Start: 2024-10-31 | End: 2024-11-04

## 2024-10-31 RX ORDER — BUPROPION HYDROCHLORIDE 150 MG/1
150 TABLET ORAL EVERY MORNING
Status: DISCONTINUED | OUTPATIENT
Start: 2024-11-01 | End: 2024-10-31

## 2024-10-31 RX ORDER — NICOTINE 21 MG/24HR
1 PATCH, TRANSDERMAL 24 HOURS TRANSDERMAL DAILY
Status: DISCONTINUED | OUTPATIENT
Start: 2024-10-31 | End: 2024-11-04

## 2024-10-31 RX ORDER — BISACODYL 10 MG
10 SUPPOSITORY, RECTAL RECTAL
Status: DISCONTINUED | OUTPATIENT
Start: 2024-10-31 | End: 2024-11-04

## 2024-10-31 RX ORDER — ASPIRIN 81 MG/1
81 TABLET, CHEWABLE ORAL DAILY
Status: DISCONTINUED | OUTPATIENT
Start: 2024-11-01 | End: 2024-11-04

## 2024-10-31 RX ORDER — ACETAMINOPHEN 500 MG
500 TABLET ORAL EVERY 4 HOURS PRN
Status: DISCONTINUED | OUTPATIENT
Start: 2024-10-31 | End: 2024-11-04

## 2024-10-31 RX ORDER — METOCLOPRAMIDE HYDROCHLORIDE 5 MG/ML
5 INJECTION INTRAMUSCULAR; INTRAVENOUS EVERY 8 HOURS PRN
Status: DISCONTINUED | OUTPATIENT
Start: 2024-10-31 | End: 2024-11-04

## 2024-10-31 RX ORDER — POLYETHYLENE GLYCOL 3350 17 G/17G
17 POWDER, FOR SOLUTION ORAL DAILY PRN
Status: DISCONTINUED | OUTPATIENT
Start: 2024-10-31 | End: 2024-11-04

## 2024-10-31 RX ORDER — SODIUM CHLORIDE 9 MG/ML
INJECTION, SOLUTION INTRAVENOUS CONTINUOUS
Status: DISCONTINUED | OUTPATIENT
Start: 2024-10-31 | End: 2024-11-01

## 2024-10-31 RX ORDER — SENNOSIDES 8.6 MG
17.2 TABLET ORAL NIGHTLY PRN
Status: DISCONTINUED | OUTPATIENT
Start: 2024-10-31 | End: 2024-11-04

## 2024-10-31 RX ORDER — ECHINACEA PURPUREA EXTRACT 125 MG
1 TABLET ORAL
Status: DISCONTINUED | OUTPATIENT
Start: 2024-10-31 | End: 2024-11-04

## 2024-10-31 RX ORDER — ADENOSINE 3 MG/ML
6 INJECTION, SOLUTION INTRAVENOUS ONCE
Status: DISCONTINUED | OUTPATIENT
Start: 2024-10-31 | End: 2024-10-31

## 2024-10-31 RX ORDER — ADENOSINE 3 MG/ML
12 INJECTION, SOLUTION INTRAVENOUS ONCE
Status: DISCONTINUED | OUTPATIENT
Start: 2024-10-31 | End: 2024-10-31

## 2024-10-31 RX ORDER — RIVAROXABAN 20 MG/1
20 TABLET, FILM COATED ORAL
COMMUNITY
Start: 2024-10-30

## 2024-10-31 RX ORDER — DILTIAZEM HYDROCHLORIDE 5 MG/ML
10 INJECTION INTRAVENOUS ONCE
Status: COMPLETED | OUTPATIENT
Start: 2024-10-31 | End: 2024-10-31

## 2024-10-31 RX ORDER — ENOXAPARIN SODIUM 100 MG/ML
80 INJECTION SUBCUTANEOUS ONCE
Status: COMPLETED | OUTPATIENT
Start: 2024-10-31 | End: 2024-10-31

## 2024-10-31 RX ORDER — AMIODARONE HYDROCHLORIDE 450 MG/9ML
INJECTION, SOLUTION INTRAVENOUS
Status: DISCONTINUED
Start: 2024-10-31 | End: 2024-10-31

## 2024-10-31 NOTE — CONSULTS
[facility] - CARDIOLOGY CONSULT NOTE    Enoch Walker Patient Status:  Observation    1945 MRN [id number]   Location [facility] 2NE-A Attending Dony Stern DO   Hosp Day # 0 PCP Joseph Powell MD     Date of Admission:  10/31/2024  Date of Consult:  10/31/2024  I was asked by Dony Stern DO to provide recommendations for evaluation and management of cardiac issues.    Reason for Consultation:     Tachycardia    History of Present Illness:   Patient is a 78 year old male with history of atrial fibrillation status post remote ablation, has been good for the last couple years noted on Monday to have feelings of palpitations and went to the emergency room.  He was found to be in atrial fibrillation and converted back to normal sinus was discharged on metoprolol and Xarelto.  He had not started his medicines yet.  This morning had chest pain lightheadedness and palpitations and presented to the emergency room.  He was found to be in an atrial flutter.  Transferred over for further care.  Overall feels better his heart rates are in the 120s, his EKG shows atrial flutter 2-1.      Results:     Lab Results   Component Value Date    WBC 13.5 (H) 10/31/2024    HGB 16.1 10/31/2024    HCT 46.7 10/31/2024    .0 10/31/2024    CREATSERUM 1.49 (H) 10/31/2024    BUN 13 10/31/2024     10/31/2024    K 3.9 10/31/2024     10/31/2024    CO2 22.0 10/31/2024     (H) 10/31/2024    CA 9.2 10/31/2024    ALB 3.5 10/31/2024    ALKPHO 63 10/31/2024    BILT 0.5 10/31/2024    TP 7.2 10/31/2024    AST 21 10/31/2024    ALT 30 10/31/2024    PTT 33.3 10/31/2024    INR 1.21 (H) 10/31/2024    T4F 0.9 2020    TSH 3.733 10/31/2024    B12 434 2016       XR CHEST AP PORTABLE  (CPT=71045)    Result Date: 10/31/2024  CONCLUSION:  No active cardiopulmonary process identified.   LOCATION:  [facility]      Dictated by (CST): Robbie Tang MD on 10/31/2024 at 7:23 AM     Finalized by (CST):  Robbie Tang MD on 10/31/2024 at 7:24 AM      EKG    Result Date: 10/31/2024  Sinus tachycardia with occasional Premature ventricular complexes Incomplete right bundle branch block Septal infarct , age undetermined Abnormal ECG When compared with ECG of 31-OCT-2024 06:32, Sinus rhythm has replaced Wide QRS tachycardia Vent. rate has decreased  BPM    EKG    Result Date: 10/31/2024  Wide QRS tachycardia Nonspecific intraventricular block Biventricular hypertrophy Inferior infarct , age undetermined Anterolateral infarct , age undetermined Abnormal ECG When compared with ECG of 28-OCT-2024 20:49, Wide QRS tachycardia has replaced Sinus rhythm Vent. rate has increased  BPM     Past Medical History  Past Medical History:    Abdominal aortic atherosclerosis (HCC)    With mild dilation. 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 Fib/flutter    Colonoscopy      Li report 3/2010--tics and polyps    Colonoscopy N/A 1/25/2017    Procedure: COLONOSCOPY, POSSIBLE BIOPSY, POSSIBLE POLYPECTOMY 43956;  Surgeon: Lalito Benton DO;  Location: [facility] intracard cath ablation arrhythmia  01/2019    Dr. Denis Hurt, [facility]     Tonsillectomy         Family History  No family history on file.    Social History  Pediatric History   Patient Parents    Not on file     Other Topics Concern    Caffeine Concern Not Asked    Exercise Not Asked    Seat Belt Not Asked    Special Diet Not Asked    Stress Concern Not Asked    Weight Concern Not Asked   Social History Narrative    Not on file           Current Medications:  Current Facility-Administered Medications   Medication Dose Route Frequency    [START ON 11/1/2024] aspirin chewable tab 81 mg  81 mg Oral Daily    [START ON 11/1/2024] metoprolol succinate ER (Toprol XL) 24 hr tab 25 mg  25 mg Oral Daily Beta Blocker    [START ON 11/1/2024] rivaroxaban (Xarelto) tab 20 mg  20 mg Oral Daily with food    dilTIAZem (cardIZEM) 100 mg in sodium chloride 0.9% 100 mL IVPB-ADDV  2.5-20 mg/hr Intravenous Continuous    nicotine (Nicoderm CQ) 14 MG/24HR patch 1 patch  1 patch Transdermal Daily    sodium chloride 0.9% infusion   Intravenous Continuous    acetaminophen (Tylenol Extra Strength) tab 500 mg  500 mg Oral Q4H PRN    melatonin tab 3 mg  3 mg Oral Nightly PRN    polyethylene glycol (PEG 3350) (Miralax) 17 g oral packet 17 g  17 g Oral Daily PRN    sennosides (Senokot) tab 17.2 mg  17.2 mg Oral Nightly PRN    bisacodyl (Dulcolax) 10 MG rectal suppository 10 mg  10 mg Rectal Daily PRN    fleet enema (Fleet) rectal enema 133 mL  1 enema Rectal Once PRN    ondansetron (Zofran) 4 MG/2ML injection 4 mg  4 mg Intravenous Q6H PRN    metoclopramide (Reglan) 5 mg/mL injection 5 mg  5 mg Intravenous Q8H PRN    benzonatate (Tessalon) cap 200 mg  200 mg Oral TID PRN    guaiFENesin (Robitussin) 100 MG/5 ML oral liquid 200 mg  200 mg Oral Q4H PRN    glycerin-hypromellose- (Artificial Tears) 0.2-0.2-1 % ophthalmic solution 1 drop  1 drop Both Eyes QID PRN    sodium chloride (Saline Mist) 0.65 % nasal solution 1 spray  1 spray Each Nare Q3H PRN     Medications Prior to Admission   Medication Sig    XARELTO 20  MG Oral Tab Take 1 tablet (20 mg total) by mouth daily with food.    Multiple Vitamin (MULTIVITAMIN ADULT OR) Take by mouth.    aspirin 81 MG Oral Chew Tab Chew 1 tablet (81 mg total) by mouth daily. 1 qd    Metoprolol Succinate ER 25 MG Oral Tablet 24 Hr Take 1 tablet (25 mg total) by mouth daily.       Allergies  Allergies[1]    Review of Systems:   10 pt ROS performed, separate from HPI  Review of Systems:  GENERAL: no fevers, chills, sweats  HEENT: no visual or hearing changes  SKIN: denies any unusual skin lesions or rashes  RESPIRATORY: denies shortness of breath with exertion  CARDIOVASCULAR: no active chest pain, no claudication  GI: denies abdominal pain and denies heartburn  : no dysuria or hematuria  NEURO: denies headaches, focal weaknesses or paresthesias  All other systems were reviewed are negative  Physical Exam:   Blood pressure 97/70, pulse (!) 129, temperature 98.6 °F (37 °C), temperature source Oral, resp. rate 17, height 70\", weight 159 lb 9.8 oz (72.4 kg), SpO2 95%.    Scheduled Meds:    [START ON 11/1/2024] aspirin  81 mg Oral Daily    [START ON 11/1/2024] metoprolol succinate ER  25 mg Oral Daily Beta Blocker    [START ON 11/1/2024] rivaroxaban  20 mg Oral Daily with food    nicotine  1 patch Transdermal Daily         Physical Exam:    General: Alert and oriented. No apparent distress. No respiratory or constitutional distress.  HEENT: Normocephalic, anicteric sclera, neck supple  Neck: No JVD, carotids 2+, supple  Cardiac: Regular rate. No pathologic murmur.  Lungs: Clear with normal effort.  Normal excursions and effort.  Abdomen: Soft, non-tender. BS-present.  Extremities: Without clubbing, cyanosis.  Peripheral pulsespresent.  Neurologic: Alert and oriented, normal affect. Motor ok.  Skin: Warm and dry.     Imaging: I independently visualized all relevant chest imaging in PACS, agree with radiology interpretation except where noted.  Thank you for allowing me to participate in the care  of your patient.    Labs:  HEM:  Recent Labs   Lab 10/28/24  1920 10/31/24  0637   WBC 10.9 13.5*   HGB 13.8 16.1   .0 291.0       Chem:  Recent Labs   Lab 10/28/24  1920 10/31/24  0638   * 136   K 3.9 3.9    105   CO2 22.0 22.0   BUN 19 13   CREATSERUM 1.17 1.49*   CA 9.4 9.2   GLU 97 209*       Recent Labs   Lab 10/28/24  1920 10/31/24  0638   ALT 23 30   AST 21 21   ALB 3.7 3.5       No results for input(s): \"TROP\", \"CK\" in the last 168 hours.    Recent Labs   Lab 10/31/24  0637   PTP 15.1*   INR 1.21*       Impression:   1.  Atrial flutter with RVR-when EKG showed atrial flutter no one-to-one pattern with heart rates in the 200s, now EKG shows atrial flutter and a 2-1 pattern.  Start Xarelto and diltiazem drip.  Will see if patient converts on his own if continues to be in an RVR will discuss with EP regarding utility of cardioversion versus starting antiarrhythmic tomorrow.  Continue metoprolol as well.  2.  Elevated troponin-very low level, suspect in the setting of RVR and demand.  Will continue to trend to peak.  May need ischemic evaluation as an outpatient.    Recommendations:  Start Xarelto  Diltiazem drip  Continue metoprolol  If does not convert into normal rhythm EP to see and discuss antiarrhythmics and ablation.  Check echo  Check TSH    C 5    Mendez Gonzalez MD  [facility]  10/31/2024         [1]   Allergies  Allergen Reactions    Demerol Hcl [Meperidine] HIVES    Penicillins UNKNOWN and OTHER (SEE COMMENTS)     Other reaction(s): Other - See Comments  Unknown

## 2024-10-31 NOTE — PROGRESS NOTES
10/31/24 1256 10/31/24 1257 10/31/24 1258   Vital Signs   /87 102/79 97/70   MAP (mmHg) 95 88 79   BP Location Left arm Left arm Left arm   BP Method Automatic Automatic Automatic   Patient Position Lying Sitting Standing     Orthostatic vitals; patient did not feel dizzy or lightheaded

## 2024-10-31 NOTE — ED QUICK NOTES
Orders for admission, patient is aware of plan and ready to go upstairs. Any questions, please call ED ABRAN Delgadillo at extension [phone].     Patient Covid vaccination status: Unvaccinated     COVID Test Ordered in ED: None    COVID Suspicion at Admission: N/A    Running Infusions:  None    Mental Status/LOC at time of transport: A&Ox4    Other pertinent information: N/A  CIWA score: N/A   NIH score:  N/A

## 2024-10-31 NOTE — ED PROVIDER NOTES
Patient Seen in: [facility] [place]      History     Chief Complaint   Patient presents with    Arrythmia/Palpitations     Stated Complaint: Afib    Subjective:   HPI      Patient here for chest pain shortness with palpitation started somewhat acutely around 5 AM.  He is clearly uncomfortable not able to answer many questions.  Wife later told me that he has a history of A-fib status post ablation.  He is here recently was diagnosed with transient A-fib sent home on Xarelto, he is continuing his metoprolol.        Objective:     Past Medical History:    Abdominal aortic atherosclerosis (HCC)    With mild dilation. 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 Fib/flutter    Colonoscopy      Li report 3/2010--tics and polyps    Colonoscopy N/A 1/25/2017    Procedure: COLONOSCOPY, POSSIBLE BIOPSY, POSSIBLE POLYPECTOMY 69433;  Surgeon: Lalito Benton DO;  Location: [facility] intracard cath ablation arrhythmia  01/2019    Dr. Denis Hurt, [facility]    Tonsillectomy                  Social History     Socioeconomic History    Marital status:    Tobacco Use    Smoking status: Every Day     Current packs/day: 1.00     Average  packs/day: 1 pack/day for 30.0 years (30.0 ttl pk-yrs)     Types: Cigarettes     Passive exposure: Never    Smokeless tobacco: Never   Vaping Use    Vaping status: Never Used   Substance and Sexual Activity    Alcohol use: Yes     Comment: 6 beers/day    Drug use: No                  Physical Exam     ED Triage Vitals [10/31/24 0635]   BP (!) 76/45   Pulse (!) 225   Resp 24   Temp 97.4 °F (36.3 °C)   Temp src Oral   SpO2 99 %   O2 Device Nasal cannula       Current Vitals:   Vital Signs  BP: 112/79  Pulse: 118  Resp: 18  Temp: 97.4 °F (36.3 °C)  Temp src: Oral    Oxygen Therapy  SpO2: 98 %  O2 Device: None (Room air)  O2 Flow Rate (L/min): 2 L/min        Physical Exam  On arrival the patient is short of breath, looks uncomfortable pale    Normocephalic atraumatic  Weak pulses in the extremities    Bilateral breath sounds present    Critically tachycardic    Belly benign    Speaking short sentences    ED Course     Labs Reviewed   COMP METABOLIC PANEL (14) - Abnormal; Notable for the following components:       Result Value    Glucose 209 (*)     Creatinine 1.49 (*)     eGFR-Cr 48 (*)     A/G Ratio 0.9 (*)     All other components within normal limits   CBC WITH DIFFERENTIAL WITH PLATELET - Abnormal; Notable for the following components:    WBC 13.5 (*)     MCH 34.4 (*)     Neutrophil Absolute Prelim 7.81 (*)     Neutrophil Absolute 7.81 (*)     Lymphocyte Absolute 4.06 (*)     Monocyte Absolute 1.02 (*)     All other components within normal limits   TROPONIN I HIGH SENSITIVITY - Abnormal; Notable for the following components:    Troponin I (High Sensitivity) 95 (*)     All other components within normal limits   PROTHROMBIN TIME (PT) - Abnormal; Notable for the following components:    PT 15.1 (*)     INR 1.21 (*)     All other components within normal limits   PTT, ACTIVATED - Normal   LIPID PANEL   RAINBOW DRAW LAVENDER   RAINBOW DRAW LIGHT GREEN   RAINBOW DRAW BLUE     EKG    Rate, intervals and axes as noted on  EKG Report.  Rate: 236  Rhythm: SVT versus V. tach  Reading: SVT with aberrancy versus V. tach                Patient presented in an unstable wide-complex tachycardia.  Differential included SVT versus V. tach.  He was unstable so did  did not really matter which he was in at the time.    The patient required sedation for emergent cardioversion.   This procedure was done emergently.      Under my direct supervision the patient was given 50 mg of fentanyl and 10 mg of etomidate.  An appropriate level of sedation was achieved.  The patient remained hemodynamically stable with normal oxygen saturations during the procedure.  There were no complications related to the sedation.  Patient was observed until mental status returned to baseline.  The sedation lasted 10 minutes during which I was present.    I was about to cardiovert the patient with 120 J.  Just as as about 2 shock, he actually converted out of bed and into A-fib.  Blood pressure rebounded so cardioversion was deferred.    Medications   adenosine (Adenocard) 6 mg/2mL injection 12 mg (12 mg Intravenous Not Given 10/31/24 0634)   adenosine (Adenocard) 6 mg/2mL injection 6 mg (6 mg Intravenous Not Given 10/31/24 0634)   amiodarone (Cordarone) 450 MG/9ML injection (  Not Given 10/31/24 0651)   sodium chloride 0.9 % IV bolus 500 mL (500 mL Intravenous New Bag 10/31/24 0740)   fentaNYL (Sublimaze) 50 mcg/mL injection (50 mcg  Given 10/31/24 0636)   etomidate (Amidate) 2 mg/mL injection (10 mg Intravenous Given 10/31/24 0638)   dilTIAZem (cardIZEM) 25 mg/5mL injection 10 mg (10 mg Intravenous Given 10/31/24 0644)           Repeat EKG:    EKG    Rate, intervals and axes as noted on EKG Report.  Rate: 119  Rhythm: Sinus Rhythm  Reading: Sinus tachycardia           MDM          Differential diagnoses considered: A-fib with a Ordonez C, SVT with aberrancy, V. tach is a consideration as well.    -Patient was unwell on arrival, chest pain shortness of breath pale,  diaphoretic however spontaneously converted out of the arrhythmia just prior to being shocked.    -Given his instability upon arrival will admit for close observation and cardiology consultation.    Patient will be admitted primarily to the [facility] hospitalist.  Care has been discussed with the admitting physician.      I visualized the radiology studies, my independent interpretation: CHF not suggested on chest x-ray      A total of 35 minutes of critical care time (exclusive of billable procedures) was administered to manage the patient's cardiovascular instability due to his unstable wide-complex tachycardia.  This involved direct patient intervention, complex decision making, and/or extensive discussions with the patient, family, and clinical staff.      *Discussion of ongoing management of this patient's care included:  cardiology, admitting physician      Shared decision making was done by: patient, myself.    0945  Informed by her of that bed placement will be delayed due to hospital being at capacity.    EKG presently sinus tachycardia, I did feel like he was in A-fib earlier.  The patient was on the monitor.  He had A-fib on 10/28/2024 document an EKG while he was here.  I will give a dose of Lovenox to cover him while we await bed.      Admission disposition: 10/31/2024  7:49 AM           Medical Decision Making      Disposition and Plan     Clinical Impression:  1. Wide-complex tachycardia         Disposition:  Admit  10/31/2024  7:49 am    Follow-up:  No follow-up provider specified.        Medications Prescribed:  Current Discharge Medication List              Supplementary Documentation:         Hospital Problems       Present on Admission  Date Reviewed: 3/8/2024            ICD-10-CM Noted POA    * (Principal) Wide-complex tachycardia R00.0 10/31/2024 Unknown

## 2024-10-31 NOTE — H&P
[facility]     Enoch Walker Patient Status:  Observation    1945 MRN [id number]   Location [facility] 2NE-A Attending Dony Stern,    Hosp Day # 0 PCP Joseph Powell MD     Chief Complaint: Palpitations, chest pain    Subjective:    History of Present Illness:   Enoch Walker is a 78 year old male with PMHx paroxysmal atrial flutter/fibrillation s/p ablation, HOCM, ascending aortic aneurysm, smoker and depression who presents to the hospital with chest pain and palpitations that started at 2am. Chest pain was dull and there was no radiation of the pain. This happened to him 3 days ago and was seen in ER where he was found to be in afib RVR and was treated with cardizem. He was sent home on xarelto which he took the first dose this morning. However, after taking the xarelto, he vomiting a few hours later. Currently denies any nausea. No recent fever, diarrhea, abdominal pain or dysuria. No current chest pain. In the ER, he was found to be in WCT with 's. He was also hypotensive. Plan was for cardioversion but he converted to sinus and subsequently felt better. He then converted to atrial fibrillation and was subsequently admitted with cardiology on consult. He did receive a dose of full dose lovenox in ER.     History/Other:    Past Medical History:  Past Medical History:    Abdominal aortic atherosclerosis (HCC)    With mild dilation. 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 Fib/flutter    Colonoscopy      Li report 3/2010--tics and polyps    Colonoscopy N/A 1/25/2017    Procedure: COLONOSCOPY, POSSIBLE BIOPSY, POSSIBLE POLYPECTOMY 56981;  Surgeon: Lalito Benton DO;  Location: [facility] intracard cath ablation arrhythmia  01/2019    Dr. Denis Hurt, [facility]    Tonsillectomy        Family History:   No family history on file.  Social History:    reports that he has been smoking cigarettes. He has a 30 pack-year smoking history. He has never been exposed to tobacco smoke. He has never used smokeless tobacco. He reports current alcohol use. He reports that he does not use drugs.     Allergies: Allergies[1]    Medications:  Medications Ordered Prior to Encounter[2]    Review of Systems:   A comprehensive review of systems was completed.    Pertinent positives and negatives noted in the HPI.    Objective:   Physical Exam:    BP 97/70 (BP Location: Left arm)   Pulse (!) 129   Temp 98.6 °F (37 °C) (Oral)   Resp 17   Ht 5' 10\" (1.778 m)   Wt 159 lb 9.8 oz (72.4 kg)   SpO2 95%   BMI 22.90 kg/m²   General: No acute distress, awake and alert  Respiratory: No rhonchi, no wheezes  Cardiovascular: S1, S2. IRIR  Abdomen: Soft, Non-tender, non-distended, positive bowel sounds  Neuro: No new focal deficits  Extremities: No edema    Results:    Labs:      Labs Last 24 Hours:  Recent Labs   Lab 10/28/24  1920 10/31/24  0637   RBC 4.07 4.68   HGB 13.8 16.1   HCT 39.8 46.7   MCV 97.8 99.8   MCH 33.9 34.4*   MCHC 34.7 34.5   RDW 13.1 13.3   NEPRELIM 7.54 7.81*   WBC 10.9 13.5*   .0 291.0     Recent Labs   Lab 10/28/24  1920 10/31/24  0638   GLU 97 209*   BUN 19 13   CREATSERUM  1.17 1.49*   EGFRCR 65 48*   CA 9.4 9.2   ALB 3.7 3.5   * 136   K 3.9 3.9    105   CO2 22.0 22.0   ALKPHO 60 63   AST 21 21   ALT 23 30   BILT 0.5 0.5   TP 7.0 7.2     Lab Results   Component Value Date    INR 1.21 (H) 10/31/2024     Recent Labs   Lab 10/28/24  1920 10/31/24  0638   TROPHS 18 95*     No results for input(s): \"TROP\", \"PBNP\" in the last 168 hours.    No results for input(s): \"PCT\" in the last 168 hours.    Imaging: Imaging data reviewed in Epic.    Assessment & Plan:      #WCT, afib RVR with RBBB vs ventricular tachyardia  -Spontaneously converted in ER before cardioversion but then flipped into afib RVR    #Paroxysmal atrial flutter/fibrillation s/p prior ablations  -Cardiology consult  -Check TSH  -Telemetry  -IV diltiazem  -Resume home xarelto and BB    #Elevated troponin due to above  -Per cardiology  -Check echo    #HOCM  -Echo March 2023 with pEF and 1.8cm hypertrophy of septum  -Repeat echo     #Ascending aortic aneurysm  -4.3 cm on echo in March 2023, repeat pending    #Current smoker  -1.5 PPD for 50 years  -Nicotine patch    #Hyperglycemia  -Check A1c    #SHAREE  -Gentle IVF x 12 hours    #Leukocytosis, possibly reactive  -CXR without acute abnormality  -Monitor WBC      Plan of care discussed with patient, family, RN.    Dony Stern,     Supplementary Documentation:     The 21st Century Cures Act makes medical notes like these available to patients in the interest of transparency. Please be advised this is a medical document. Medical documents are intended to carry relevant information, facts as evident, and the clinical opinion of the practitioner. The medical note is intended as peer to peer communication and may appear blunt or direct. It is written in medical language and may contain abbreviations or verbiage that are unfamiliar.          [1]   Allergies  Allergen Reactions    Demerol Hcl [Meperidine] HIVES    Penicillins UNKNOWN and OTHER (SEE COMMENTS)     Other  reaction(s): Other - See Comments  Unknown     [2]   Current Facility-Administered Medications on File Prior to Encounter   Medication Dose Route Frequency Provider Last Rate Last Admin    [COMPLETED] sodium chloride 0.9 % IV bolus 1,000 mL  1,000 mL Intravenous Once Rubin Finley MD   Stopped at 10/28/24 2018    [COMPLETED] heparin (Porcine) 1000 UNIT/ML injection - BOLUS IV 4,600 Units  60 Units/kg Intravenous Once Rubin Finely MD   4,600 Units at 10/28/24 2017    [COMPLETED] heparin (Porcine) 19698 units/250mL infusion ED INITIAL DOSE  12 Units/kg/hr Intravenous Once Rubin Finley MD   Stopped at 10/28/24 2050     Current Outpatient Medications on File Prior to Encounter   Medication Sig Dispense Refill    XARELTO 20 MG Oral Tab Take 1 tablet (20 mg total) by mouth daily with food.      Multiple Vitamin (MULTIVITAMIN ADULT OR) Take by mouth.      aspirin 81 MG Oral Chew Tab Chew 1 tablet (81 mg total) by mouth daily. 1 qd      Metoprolol Succinate ER 25 MG Oral Tablet 24 Hr Take 1 tablet (25 mg total) by mouth daily.      buPROPion  MG Oral Tablet 24 Hr Take 1 tablet (150 mg total) by mouth every morning. 90 tablet 0

## 2024-10-31 NOTE — ED QUICK NOTES
[facility]ole at [facility] notified of patient's departure from [facility].

## 2024-10-31 NOTE — PLAN OF CARE
NURSING ADMISSION NOTE      Patient admitted via Cart  Oriented to room.  Safety precautions initiated.  Bed in low position.  Call light in reach.  Patient alert and oriented x 4. On RA. Up ad lauren. Sinus tach on tele; HR 120s. Continent of bowel/bladder. No complaints of pain, shortness of breath, chest pain/discomfort. POC: echo, start cardizem gtt, monitor HR. Call light within reach. Fall precautions in place. Admission navigator completed with patient. Patient and family updated on POC, all questions answered at this time.     Problem: Patient/Family Goals  Goal: Patient/Family Long Term Goal  Description: Patient's Long Term Goal: to go home    Interventions:  - cardiology to see  -monitor HR  - See additional Care Plan goals for specific interventions  Outcome: Progressing  Goal: Patient/Family Short Term Goal  Description: Patient's Short Term Goal: to feel better    Interventions:   - meds as ordered  -echo  - See additional Care Plan goals for specific interventions  Outcome: Progressing     Problem: CARDIOVASCULAR - ADULT  Goal: Maintains optimal cardiac output and hemodynamic stability  Description: INTERVENTIONS:  - Monitor vital signs, rhythm, and trends  - Monitor for bleeding, hypotension and signs of decreased cardiac output  - Evaluate effectiveness of vasoactive medications to optimize hemodynamic stability  - Monitor arterial and/or venous puncture sites for bleeding and/or hematoma  - Assess quality of pulses, skin color and temperature  - Assess for signs of decreased coronary artery perfusion - ex. Angina  - Evaluate fluid balance, assess for edema, trend weights  Outcome: Progressing  Goal: Absence of cardiac arrhythmias or at baseline  Description: INTERVENTIONS:  - Continuous cardiac monitoring, monitor vital signs, obtain 12 lead EKG if indicated  - Evaluate effectiveness of antiarrhythmic and heart rate control medications as ordered  - Initiate emergency measures for life threatening  arrhythmias  - Monitor electrolytes and administer replacement therapy as ordered  Outcome: Progressing

## 2024-10-31 NOTE — CONSULTS
[facility]  Consultation    Enoch Walker Patient Status:  Observation    1945 MRN [id number]   Location [facility] 2NE-A Attending Dony Stern,    Hosp Day # 0 PCP Joseph Powell MD     IP Consult to Cardiology  Consult performed by: Delia Rangel APN  Consult ordered by: Toño Noel MD          Reason for Consultation:  WCT    History of Present Illness:  Enoch Walker is a a(n) 78 year old male who presented to the ER this morning after waking up feeling funny and having low, dull chest pain associated with palpitations.around 0200.  He also felt very weak and fatigued. He has a similar sensation on Monday of this week and went to the ER and was found to be in atrial fibrillation with RVR, He was treated with cardizem and sent home with a prescription for xarelto.     Upon presentation today he was unstable and found to be in WCT with  and hypotensive. He was going to cardioverted when he suddenly converted to SR. His symptoms improved with NSR.     He has a past medical history of HOCM, paroxysmal atrial fibrillation , atrial flutter with previous ablations, RIMA/DCCV. He was off of a blood thinner recently and was advised to restart on Monday. RX was filled yesterday and he took his first dose this am at 0515, then vomited at 0700.     Other history includes persistent tobacco abuse, lung nodues, dilated ascending aorta (most recently 4.3 cm on echo 3/2/2023).     Currently he is in atrial fibrillation with .     History:  Past Medical History:    Abdominal aortic atherosclerosis (HCC)    With mild dilation. 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 Fib/flutter    Colonoscopy      Li report 3/2010--tics and polyps    Colonoscopy N/A 1/25/2017    Procedure: COLONOSCOPY, POSSIBLE BIOPSY, POSSIBLE POLYPECTOMY 95282;  Surgeon: Lalito Benton DO;  Location: [facility] intracard cath ablation arrhythmia  01/2019    Dr. Denis Hurt, [facility]    Tonsillectomy       No family history on file.   reports that he has been smoking cigarettes. He has a 30 pack-year smoking history. He has never been exposed to tobacco smoke. He has never used smokeless tobacco. He reports current alcohol use. He reports that he does not use drugs.    Allergies:  Allergies[1]    Medications:    Current Facility-Administered Medications:     [START ON 11/1/2024] aspirin chewable tab 81 mg, 81 mg, Oral, Daily    [START ON 11/1/2024] buPROPion ER (Wellbutrin XL) 24 hr tab 150 mg, 150 mg, Oral, QAM    [START ON 11/1/2024] metoprolol succinate ER (Toprol XL) 24 hr tab 25 mg, 25 mg, Oral, Daily Beta Blocker    [START ON 11/1/2024] rivaroxaban (Xarelto) tab 20 mg, 20 mg, Oral, Daily with food    dilTIAZem (cardIZEM) 100 mg in sodium chloride 0.9% 100 mL IVPB-ADDV, 2.5-20 mg/hr, Intravenous, Continuous    nicotine (Nicoderm CQ) 14 MG/24HR patch 1 patch, 1 patch, Transdermal, Daily    Review of Systems:    Constitutional: Negative.    HENT: Negative.     Respiratory:  Positive for cough.    Cardiovascular:  Positive for palpitations.   Skin: Negative.          OBJECTIVE  Blood pressure 97/70, pulse (!) 129, temperature 98.6 °F (37 °C), temperature source  Oral, resp. rate 17, height 5' 10\" (1.778 m), weight 159 lb 9.8 oz (72.4 kg), SpO2 95%.  Temp (24hrs), Av °F (36.7 °C), Min:97.4 °F (36.3 °C), Max:98.6 °F (37 °C)    Wt Readings from Last 3 Encounters:   10/31/24 159 lb 9.8 oz (72.4 kg)   10/28/24 170 lb (77.1 kg)   24 172 lb (78 kg)       Telemetry: afib with rVR,     Physical Exam:  Physical Exam  Constitutional:       Appearance: Normal appearance.   Cardiovascular:      Rate and Rhythm: Rhythm irregular.   Pulmonary:      Effort: Pulmonary effort is normal.   Skin:     General: Skin is warm and dry.   Neurological:      Mental Status: He is alert.              Diagnostics History:  EKG:   Echo: 2024  STUDY CONCLUSIONS   SUMMARY:     1. Left ventricle: The cavity size is normal. Wall thickness is severely      increased. There is focal basal and concentric hypertrophy. Systolic      function is hyperdynamic. The ejection fraction was measured by biplane      method of disks. The ejection fraction is 70%.   2. Right ventricle: The cavity size is normal. Wall thickness is normal.      Systolic function is normal.   3. Pulmonic valve: There is trace regurgitation.   4. Pulmonary arteries: Systolic pressure is within the normal range, estimated      to be 24mm Hg.     Stress Test: 2000  IMPRESSION:   1. Normal myocardial perfusion examination.   2. The overall quality of the study is good.   3. Normal perfusion imaging without evidence of inducible ischemia or infarct.   4. Normal left ventricular volume with normal systolic thickening and function and an ejection fraction of 68%.   5. Unchanged c/w prior study from 2018     CXR:   FINDINGS:  Stable calcified granuloma in the right lower lobe.  External pacer pads noted along the left lower chest.  Cardiomegaly with normal pulmonary vascularity. No pleural effusion or pneumothorax. No lobar consolidation.                   Impression   CONCLUSION:  No active cardiopulmonary process  identified.                Results:   Recent Labs   Lab 10/28/24  1920 10/31/24  0638   GLU 97 209*   BUN 19 13   CREATSERUM 1.17 1.49*   EGFRCR 65 48*   CA 9.4 9.2   * 136   K 3.9 3.9    105   CO2 22.0 22.0     Recent Labs   Lab 10/28/24  1920 10/31/24  0637   RBC 4.07 4.68   HGB 13.8 16.1   HCT 39.8 46.7   MCV 97.8 99.8   MCH 33.9 34.4*   MCHC 34.7 34.5   RDW 13.1 13.3   NEPRELIM 7.54 7.81*   WBC 10.9 13.5*   .0 291.0         No results for input(s): \"BNP\" in the last 168 hours.  Lab Results   Component Value Date    INR 1.21 (H) 10/31/2024     No results found for: \"TROP\"      XR CHEST AP PORTABLE  (CPT=71045)    Result Date: 10/31/2024  CONCLUSION:  No active cardiopulmonary process identified.   LOCATION:  [facility]      Dictated by (CST): Robbie Tang MD on 10/31/2024 at 7:23 AM     Finalized by (CST): Robbie Tang MD on 10/31/2024 at 7:24 AM           Assessment and Plan  WCT -  atrial fibrillation with RVR and RBBB versus ventricular tachycardia  Paroxysmal atrial fibrillation , atrial flutter  s/p PVI with cryoballoon ablation 3/19/18  s/p Recurrence RIMA/DCCV 12/16/18   s/p redo PVI with cryoballoon, posterior wall isolation, and SVC isolation 1/28/19  induced during EP study   s/p cavotricuspid isthmus ablation on 3/19/18  s/p empiric roof line and anterior mitral isthmus ablation 1/28/19  Was on ASA and toprol at home  HOCM  Most recent echo 3/2/2023 with EF 61%, 1.8 cm hypertrophy of the septum  Dilated ascending aorta  Most recent echo 3/2/2023 4.3 cm  Smoker  1.5 PPD for more than 50 years  Lung nodules      Plan:   Start IV cardizem infusion for rate control  Continue BB, ASA   Patient took xarelto this am , then vomited 1.5 hours later. Will D/W Dr. Gonzalez.   Start nicotine patch  Will get an echo    AYDEE Noe  10/31/2024  2:18 PM         [1]   Allergies  Allergen Reactions    Demerol Hcl [Meperidine] HIVES    Penicillins UNKNOWN and OTHER (SEE COMMENTS)     Other  reaction(s): Other - See Comments  Unknown

## 2024-11-01 LAB
ANION GAP SERPL CALC-SCNC: 2 MMOL/L (ref 0–18)
APTT PPP: 35.5 SECONDS (ref 23–36)
BASOPHILS # BLD AUTO: 0.07 X10(3) UL (ref 0–0.2)
BASOPHILS NFR BLD AUTO: 0.6 %
BUN BLD-MCNC: 10 MG/DL (ref 9–23)
CALCIUM BLD-MCNC: 8.8 MG/DL (ref 8.7–10.4)
CHLORIDE SERPL-SCNC: 110 MMOL/L (ref 98–112)
CO2 SERPL-SCNC: 26 MMOL/L (ref 21–32)
CREAT BLD-MCNC: 0.97 MG/DL
EGFRCR SERPLBLD CKD-EPI 2021: 81 ML/MIN/1.73M2 (ref 60–?)
EOSINOPHIL # BLD AUTO: 0.3 X10(3) UL (ref 0–0.7)
EOSINOPHIL NFR BLD AUTO: 2.8 %
ERYTHROCYTE [DISTWIDTH] IN BLOOD BY AUTOMATED COUNT: 13.2 %
GLUCOSE BLD-MCNC: 109 MG/DL (ref 70–99)
HCT VFR BLD AUTO: 40.2 %
HGB BLD-MCNC: 13.6 G/DL
IMM GRANULOCYTES # BLD AUTO: 0.02 X10(3) UL (ref 0–1)
IMM GRANULOCYTES NFR BLD: 0.2 %
LYMPHOCYTES # BLD AUTO: 2.85 X10(3) UL (ref 1–4)
LYMPHOCYTES NFR BLD AUTO: 26.1 %
MCH RBC QN AUTO: 33.3 PG (ref 26–34)
MCHC RBC AUTO-ENTMCNC: 33.8 G/DL (ref 31–37)
MCV RBC AUTO: 98.3 FL
MONOCYTES # BLD AUTO: 0.7 X10(3) UL (ref 0.1–1)
MONOCYTES NFR BLD AUTO: 6.4 %
NEUTROPHILS # BLD AUTO: 6.96 X10 (3) UL (ref 1.5–7.7)
NEUTROPHILS # BLD AUTO: 6.96 X10(3) UL (ref 1.5–7.7)
NEUTROPHILS NFR BLD AUTO: 63.9 %
OSMOLALITY SERPL CALC.SUM OF ELEC: 286 MOSM/KG (ref 275–295)
PLATELET # BLD AUTO: 193 10(3)UL (ref 150–450)
POTASSIUM SERPL-SCNC: 3.9 MMOL/L (ref 3.5–5.1)
Q-T INTERVAL: 256 MS
QRS DURATION: 162 MS
QTC CALCULATION (BEZET): 504 MS
R AXIS: 257 DEGREES
RBC # BLD AUTO: 4.09 X10(6)UL
SODIUM SERPL-SCNC: 138 MMOL/L (ref 136–145)
T AXIS: 193 DEGREES
TROPONIN I SERPL HS-MCNC: 356 NG/L
VENTRICULAR RATE: 233 BPM
WBC # BLD AUTO: 10.9 X10(3) UL (ref 4–11)

## 2024-11-01 PROCEDURE — 99232 SBSQ HOSP IP/OBS MODERATE 35: CPT | Performed by: INTERNAL MEDICINE

## 2024-11-01 RX ORDER — METOPROLOL SUCCINATE 25 MG/1
25 TABLET, EXTENDED RELEASE ORAL ONCE
Status: COMPLETED | OUTPATIENT
Start: 2024-11-01 | End: 2024-11-01

## 2024-11-01 RX ORDER — METOPROLOL SUCCINATE 50 MG/1
50 TABLET, EXTENDED RELEASE ORAL
Status: DISCONTINUED | OUTPATIENT
Start: 2024-11-02 | End: 2024-11-01

## 2024-11-01 RX ORDER — METOPROLOL SUCCINATE 50 MG/1
50 TABLET, EXTENDED RELEASE ORAL
Status: DISCONTINUED | OUTPATIENT
Start: 2024-11-01 | End: 2024-11-02

## 2024-11-01 RX ORDER — METOPROLOL SUCCINATE 50 MG/1
50 TABLET, EXTENDED RELEASE ORAL
Status: DISCONTINUED | OUTPATIENT
Start: 2024-11-01 | End: 2024-11-01

## 2024-11-01 NOTE — PROGRESS NOTES
Progress Note  Enoch Walker Patient Status:  Observation    1945 MRN [id number]   Location [facility] 2NE-A Attending Dony Stern,    Hosp Day # 0 PCP Joseph Powell MD     Subjective:  Doing well, no chest pain  or SOB    Objective:  /73 (BP Location: Left arm)   Pulse 76   Temp 97.7 °F (36.5 °C) (Oral)   Resp 17   Ht 5' 10\" (1.778 m)   Wt 159 lb 9.8 oz (72.4 kg)   SpO2 95%   BMI 22.90 kg/m²     Telemetry: aflutter, HR 80 at rest up to 120 with ambulation       Intake/Output: +1580        Last 3 Weights   10/31/24 1258 159 lb 9.8 oz (72.4 kg)   10/31/24 0635 168 lb (76.2 kg)   10/28/24 1907 170 lb (77.1 kg)   24 0807 172 lb (78 kg)       Labs:  Recent Labs   Lab 10/28/24  1920 10/31/24  0638 11/01/24  0259   GLU 97 209* 109*   BUN 19 13 10   CREATSERUM 1.17 1.49* 0.97   EGFRCR 65 48* 81   CA 9.4 9.2 8.8   ALB 3.7 3.5  --    * 136 138   K 3.9 3.9 3.9    105 110   CO2 22.0 22.0 26.0   ALKPHO 60 63  --    AST 21 21  --    ALT 23 30  --    BILT 0.5 0.5  --    TP 7.0 7.2  --      Recent Labs   Lab 10/28/24  1920 10/31/24  0637 24  0259   RBC 4.07 4.68 4.09   HGB 13.8 16.1 13.6   HCT 39.8 46.7 40.2   MCV 97.8 99.8 98.3   MCH 33.9 34.4* 33.3   MCHC 34.7 34.5 33.8   RDW 13.1 13.3 13.2   NEPRELIM 7.54 7.81* 6.96   WBC 10.9 13.5* 10.9   .0 291.0 193.0         Recent Labs   Lab 10/31/24  0638 10/31/24  1951 11/01/24  0259   TROPHS 95* 545* 356*     Lab Results   Component Value Date    INR 1.21 (H) 10/31/2024       Diagnostics:   Echo:   Conclusions:     1. Left ventricle: The cavity size was below normal. Wall thickness was      mildly to moderately increased with assymetric hypertrophy of the septum.      The appearance was consistent with hypertrophic cardiomyopathy. Systolic      function was hyperdynamic. The estimated ejection fraction was 75-80%, by      visual assessment. Unable to assess LV diastolic function due to heart      rhythm.   2. Aortic  root: The aortic root was 3.9cm diameter.   3. Left atrium: The left atrial volume was normal.   4. Right ventricle: The cavity size was normal. Systolic function was      hyperdynamic.   Impressions:  This study is compared with previous dated 1/3/18:   *     Review of Systems   Constitutional: Negative.   Cardiovascular: Negative.    Respiratory: Negative.         Physical Exam:    Gen: Alert, oriented x 3, in no apparent distress  Heent: Pupils equal, reactive. Mucous membranes moist.   Cardiac: Regular rate and rhythm, normal S1,S2  Lungs: Clear to auscultation  Abd: Soft, non tender, non distended  Ext: No edema  Skin: Warm, dry  Neuro: No focal deficits        Medications:     aspirin  81 mg Oral Daily    metoprolol succinate ER  25 mg Oral Daily Beta Blocker    rivaroxaban  20 mg Oral Daily with food    nicotine  1 patch Transdermal Daily      dilTIAZem 5 mg/hr (11/01/24 3356)           Assessment:  WCT - atrial fibrillation with RVR with 1:1 conduction and RBBB   Paroxysmal atrial fibrillation , atrial flutter  s/p PVI with cryoballoon ablation 3/19/18  s/p Recurrence RIMA/DCCV 12/16/18   s/p redo PVI with cryoballoon, posterior wall isolation, and SVC isolation 1/28/19  induced during EP study   s/p cavotricuspid isthmus ablation on 3/19/18  s/p empiric roof line and anterior mitral isthmus ablation 1/28/19  Was on ASA and toprol at home  Currently on IV cardizem at 5 mg/hr currently   On xarelto  HOCM  Most recent echo 3/2/2023 with EF 61%, 1.8 cm hypertrophy of the septum  Troponin elevation, most likely secondary to demand ischemia  95 > 585 > 356  Echo with LVEF 75-80%, no WMA  Plans for outpatient ischemic evaluation  Dilated ascending aorta  Most recent echo 3/2/2023 4.3 cm  Smoker  1.5 PPD for more than 50 years  Lung nodules    Plan:  Increase Toprol to 50 mg po daily   Continue Xarelto  Wean off of cardizem  Plans for outpatient LHC with Dr. Gonzalez  Await EP review.   Hopeful discharge planning to  home today.     Plan of care discussed with patient, RN.    AYDEE Noe  11/1/2024  9:36 AM    Physician addendum:  I have seen and examined the patient agree with note as written by AYDEE above    78-year-old gentleman presenting with chest pain lightheadedness found to have atypical atrial flutter, he is status post multiple ablations in the past.  His rates are currently better controlled on a dill drip.  Will uptitrate his beta-blocker in hopes to get him off the Dilt, discuss antiarrhythmic versus RIMA cardioversion with EP and eventual ablation.  In the setting of his elevated troponin he will need an ischemic evaluation with left heart cath which I will discuss with him as an outpatient.  Start aspirin and high intensity statin.    L3    Mendez Gonzalez MD  Interventional cardiology  [facility]

## 2024-11-01 NOTE — PLAN OF CARE
No acute events overnight, Cardizem gtt weaned to 5 mg per hour , HR 69-87, a-fib./flutter on Telemetry monitor.

## 2024-11-01 NOTE — PLAN OF CARE
Assumed patient care at 1930.  Alert and oriented x 4. Denies chest pain or palpitations. Diltiazem gtt in progress at 15 mg per hr.   A-fib  , heart rate 80's. IV fluids infusing  83 per hour.  Plan of care update discussed regarding  Evening medications, fall prevention measures and routine vital sign monitoring.  Problem: Patient/Family Goals  Goal: Patient/Family Long Term Goal  Description: Patient's Long Term Goal: to go home    Interventions:  - cardiology to see  -monitor HR  - See additional Care Plan goals for specific interventions  11/1/2024 0218 by Maikel Arguello RN  Outcome: Progressing  11/1/2024 0217 by Maikel Arguello RN  Outcome: Progressing  10/31/2024 2113 by Maikel Arguello RN  Outcome: Progressing  10/31/2024 2049 by Maikel Arguello RN  Outcome: Progressing     Problem: Patient/Family Goals  Goal: Patient/Family Short Term Goal  Description: Patient's Short Term Goal: to feel better    Interventions:   - meds as ordered  -echo  - See additional Care Plan goals for specific interventions  11/1/2024 0218 by Maikel Arguello RN  Outcome: Progressing  11/1/2024 0217 by Maikel Arguello RN  Outcome: Progressing  10/31/2024 2113 by Maikel Arguello RN  Outcome: Progressing  10/31/2024 2049 by Maikel Arguello RN  Outcome: Progressing     Problem: CARDIOVASCULAR - ADULT  Goal: Maintains optimal cardiac output and hemodynamic stability  Description: INTERVENTIONS:  - Monitor vital signs, rhythm, and trends  - Monitor for bleeding, hypotension and signs of decreased cardiac output  - Evaluate effectiveness of vasoactive medications to optimize hemodynamic stability  - Monitor arterial and/or venous puncture sites for bleeding and/or hematoma  - Assess quality of pulses, skin color and temperature  - Assess for signs of decreased coronary artery perfusion - ex. Angina  - Evaluate fluid balance, assess for edema, trend weights  11/1/2024 0218 by Maikel Arguello  RN  Outcome: Progressing  11/1/2024 0217 by Maikel Arguello RN  Outcome: Progressing  10/31/2024 2113 by Maikel Arguello RN  Outcome: Progressing  10/31/2024 2049 by Maikel Arguello RN  Outcome: Progressing  Goal: Absence of cardiac arrhythmias or at baseline  Description: INTERVENTIONS:  - Continuous cardiac monitoring, monitor vital signs, obtain 12 lead EKG if indicated  - Evaluate effectiveness of antiarrhythmic and heart rate control medications as ordered  - Initiate emergency measures for life threatening arrhythmias  - Monitor electrolytes and administer replacement therapy as ordered  11/1/2024 0218 by Maikel Arguello RN  Outcome: Progressing  11/1/2024 0217 by Maikel Arguello RN  Outcome: Progressing  10/31/2024 2113 by Maikel Arguello RN  Outcome: Progressing  10/31/2024 2049 by Maikel Arguello RN  Outcome: Progressing     Problem: CARDIOVASCULAR - ADULT  Goal: Absence of cardiac arrhythmias or at baseline  Description: INTERVENTIONS:  - Continuous cardiac monitoring, monitor vital signs, obtain 12 lead EKG if indicated  - Evaluate effectiveness of antiarrhythmic and heart rate control medications as ordered  - Initiate emergency measures for life threatening arrhythmias  - Monitor electrolytes and administer replacement therapy as ordered  11/1/2024 0218 by Maikel Arguello RN  Outcome: Progressing  11/1/2024 0217 by Maikel Arguello RN  Outcome: Progressing  10/31/2024 2113 by Maikel Arguello RN  Outcome: Progressing  10/31/2024 2049 by Maikel Arguello RN  Outcome: Progressing

## 2024-11-01 NOTE — PROGRESS NOTES
[facility]   part of [facility]     Hospitalist Progress Note     Enoch Walker Patient Status:  Observation    1945 MRN [id number]   Location [facility] 2NE-A Attending Dony Stern,    Hosp Day # 0 PCP Joseph Powell MD     Chief Complaint: Palpitations, chest pain    Subjective:     Patient denies further chest pain, palpitations or SOB. States his HR goes up to 126 when OOB.    Objective:    Review of Systems:   A comprehensive review of systems was completed; pertinent positive and negatives stated in subjective.    Vital signs:  Temp:  [97.7 °F (36.5 °C)-98.6 °F (37 °C)] 97.7 °F (36.5 °C)  Pulse:  [] 76  Resp:  [16-24] 17  BP: ()/(67-89) 102/73  SpO2:  [92 %-99 %] 95 %    Physical Exam:    eneral: No acute distress, awake and alert  Respiratory: No rhonchi, no wheezes  Cardiovascular: S1, S2. IRIR  Abdomen: Soft, Non-tender, non-distended, positive bowel sounds  Extremities: No edema    Diagnostic Data:    Labs:  Recent Labs   Lab 10/28/24  1920 10/31/24  0637 11/01/24  0259   WBC 10.9 13.5* 10.9   HGB 13.8 16.1 13.6   MCV 97.8 99.8 98.3   .0 291.0 193.0   INR  --  1.21*  --      Recent Labs   Lab 10/28/24  1920 10/31/24  0638 11/01/24  0259   GLU 97 209* 109*   BUN 19 13 10   CREATSERUM 1.17 1.49* 0.97   CA 9.4 9.2 8.8   ALB 3.7 3.5  --    * 136 138   K 3.9 3.9 3.9    105 110   CO2 22.0 22.0 26.0   ALKPHO 60 63  --    AST 21 21  --    ALT 23 30  --    BILT 0.5 0.5  --    TP 7.0 7.2  --      Estimated Creatinine Clearance: 66.3 mL/min (based on SCr of 0.97 mg/dL).    Recent Labs   Lab 10/31/24  0638 10/31/24  1951 11/01/24  0259   TROPHS 95* 545* 356*     Recent Labs   Lab 10/31/24  0637   PTP 15.1*   INR 1.21*      Microbiology  No results found for this visit on 10/31/24.    Imaging: Reviewed in Epic.    Medications:    aspirin  81 mg Oral Daily    metoprolol succinate ER  25 mg Oral Daily Beta Blocker    rivaroxaban  20 mg Oral Daily with food     nicotine  1 patch Transdermal Daily       Assessment & Plan:      #WCT, aflutter RVR with RBBB vs ventricular tachyardia  -Spontaneously converted in ER before cardioversion but then flipped into afib RVR    #Paroxysmal atrial flutter/fibrillation s/p prior ablations  -TSH normal  -Telemetry  -IV diltiazem  -Resume home xarelto and BB  -EP to see to discuss AAD and ablation     #Elevated troponin due to above  -Per cardiology  -Echo shows hyperdynamic EF c/w hypertrophic cardiomyopathy     #HOCM  -Echo reviewed     #Ascending aortic aneurysm  -3.8 cm on echo     #Current smoker  -1.5 PPD for 50 years  -Nicotine patch      #SHAREE  -Resolved     #Leukocytosis, possibly reactive  -CXR without acute abnormality  -Resolved      Dony Stern,     Supplementary Documentation:     Quality:  DVT Mechanical Prophylaxis:   SCDs,    DVT Pharmacologic Prophylaxis   Medication    rivaroxaban (Xarelto) tab 20 mg      DVT Pharmacologic prophylaxis: Aspirin 162 mg     Code Status: Full  Teague: No urinary catheter in place  SEAN: 0-1 day    Discharge is dependent on: Clinical state, consultant recs  At this point Mr. Walker is expected to be discharge to: Home    The 21st Century Cures Act makes medical notes like these available to patients in the interest of transparency. Please be advised this is a medical document. Medical documents are intended to carry relevant information, facts as evident, and the clinical opinion of the practitioner. The medical note is intended as peer to peer communication and may appear blunt or direct. It is written in medical language and may contain abbreviations or verbiage that are unfamiliar.

## 2024-11-01 NOTE — PLAN OF CARE
Assumed patient care at 1930.  Alert and oriented x 4. Resting in bed.  Vital signs are stable. Telemetry monitoring in progress. A-fib on monitor, rate controlled. Diltiazem gtt @ 5 mg/ hr. Troponin trending down 356 at 3 am  Denies chest pain , palpitations or shortness of breath.  Plan of care discussed  regarding evening medications, fall prevention measures, routine vital sign monitoring.  Problem: Patient/Family Goals  Goal: Patient/Family Long Term Goal  Description: Patient's Long Term Goal: to go home    Interventions:  - cardiology to see  -monitor HR  - See additional Care Plan goals for specific interventions  10/31/2024 2113 by Maikel Arguello RN  Outcome: Progressing  10/31/2024 2049 by Maikel Arguello RN  Outcome: Progressing  Goal: Patient/Family Short Term Goal  Description: Patient's Short Term Goal: to feel better    Interventions:   - meds as ordered  -echo  - See additional Care Plan goals for specific interventions  10/31/2024 2113 by Maikel Arguello RN  Outcome: Progressing  10/31/2024 2049 by Maikel Arguello RN  Outcome: Progressing     Problem: CARDIOVASCULAR - ADULT  Goal: Maintains optimal cardiac output and hemodynamic stability  Description: INTERVENTIONS:  - Monitor vital signs, rhythm, and trends  - Monitor for bleeding, hypotension and signs of decreased cardiac output  - Evaluate effectiveness of vasoactive medications to optimize hemodynamic stability  - Monitor arterial and/or venous puncture sites for bleeding and/or hematoma  - Assess quality of pulses, skin color and temperature  - Assess for signs of decreased coronary artery perfusion - ex. Angina  - Evaluate fluid balance, assess for edema, trend weights  10/31/2024 2113 by Maikel Arguello RN  Outcome: Progressing  10/31/2024 2049 by Maikel Arguello RN  Outcome: Progressing  Goal: Absence of cardiac arrhythmias or at baseline  Description: INTERVENTIONS:  - Continuous cardiac monitoring, monitor vital  signs, obtain 12 lead EKG if indicated  - Evaluate effectiveness of antiarrhythmic and heart rate control medications as ordered  - Initiate emergency measures for life threatening arrhythmias  - Monitor electrolytes and administer replacement therapy as ordered  10/31/2024 2113 by Maikel Arguello, RN  Outcome: Progressing  10/31/2024 2049 by Maikel Arguello, RN  Outcome: Progressing

## 2024-11-01 NOTE — PLAN OF CARE
Assumed care of patient at 0730. Patient is A/Ox4. On RA. Denies any pain or shortness of breath. Cardiac wise he is in afib- on Cardizem drip. Continent. Activity he is standby assist. Patient and wife updated on plan of care.     Problem: Patient/Family Goals  Goal: Patient/Family Long Term Goal  Description: Patient's Long Term Goal: to go home    Interventions:  - cardiology to see  -monitor HR  - See additional Care Plan goals for specific interventions  Outcome: Progressing  Goal: Patient/Family Short Term Goal  Description: Patient's Short Term Goal: to feel better    Interventions:   - meds as ordered  -echo  - See additional Care Plan goals for specific interventions  Outcome: Progressing

## 2024-11-01 NOTE — CONSULTS
[facility]  Cardiac Elepctrophysiology Consultation    Enoch Walker Patient Status:  Observation    1945 MRN [id number]   Location [facility] 2NE-A Attending Dony Stern DO   Hosp Day # 0 PCP Joseph Powell MD     Consults      Reason for Consultation     WCT        History of Present Illness     Enoch Walker is a a(n) 78 year old male with HCM, who has had multiple prior ablations:  Cryo PVI  Redo Cryo PVI/PWI/SVC isolaation   CTI  Roof line/Anterior Mitral line    Pt presented with a WCT- AFL with 1:1  When in AFL with 2:1 , atrial rate is exactly the rate of the WCT.      Asymptomaticnow.    10/28 came in to the ED inAfib. Converted spontaneously. He was prescribed Xarelto, but had not started it by the time he presented on 10/31.      History:     Past Medical History:    Abdominal aortic atherosclerosis (HCC)    With mild dilation. 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 Fib/flutter    Colonoscopy      Li report 3/2010--tics and polyps    Colonoscopy N/A 2017    Procedure: COLONOSCOPY, POSSIBLE BIOPSY, POSSIBLE POLYPECTOMY 36131;  Surgeon: Lalito Benton DO;  Location: [facility] intracard cath ablation arrhythmia  2019     Dr. Denis Hurt, [facility]    Tonsillectomy       No family history on file.   reports that he has been smoking cigarettes. He has a 30 pack-year smoking history. He has never been exposed to tobacco smoke. He has never used smokeless tobacco. He reports current alcohol use. He reports that he does not use drugs.      Allergies:     Allergies[1]      Medications       Current Facility-Administered Medications:     [START ON 11/2/2024] metoprolol succinate ER (Toprol XL) 24 hr tab 50 mg, 50 mg, Oral, Daily Beta Blocker    aspirin chewable tab 81 mg, 81 mg, Oral, Daily    rivaroxaban (Xarelto) tab 20 mg, 20 mg, Oral, Daily with food    nicotine (Nicoderm CQ) 14 MG/24HR patch 1 patch, 1 patch, Transdermal, Daily    acetaminophen (Tylenol Extra Strength) tab 500 mg, 500 mg, Oral, Q4H PRN    melatonin tab 3 mg, 3 mg, Oral, Nightly PRN    polyethylene glycol (PEG 3350) (Miralax) 17 g oral packet 17 g, 17 g, Oral, Daily PRN    sennosides (Senokot) tab 17.2 mg, 17.2 mg, Oral, Nightly PRN    bisacodyl (Dulcolax) 10 MG rectal suppository 10 mg, 10 mg, Rectal, Daily PRN    fleet enema (Fleet) rectal enema 133 mL, 1 enema, Rectal, Once PRN    ondansetron (Zofran) 4 MG/2ML injection 4 mg, 4 mg, Intravenous, Q6H PRN    metoclopramide (Reglan) 5 mg/mL injection 5 mg, 5 mg, Intravenous, Q8H PRN    benzonatate (Tessalon) cap 200 mg, 200 mg, Oral, TID PRN    guaiFENesin (Robitussin) 100 MG/5 ML oral liquid 200 mg, 200 mg, Oral, Q4H PRN    glycerin-hypromellose- (Artificial Tears) 0.2-0.2-1 % ophthalmic solution 1 drop, 1 drop, Both Eyes, QID PRN    sodium chloride (Saline Mist) 0.65 % nasal solution 1 spray, 1 spray, Each Nare, Q3H PRN      Review of Systems     10 point ROS was negative except        Telemetry:         Telemetry: AFL       Physical Exam     Physical Exam   Blood pressure (!) 127/97, pulse 93, temperature 98.1 °F (36.7 °C), temperature source Oral, resp. rate 20, height 5' 10\" (1.778 m), weight 159  lb 9.8 oz (72.4 kg), SpO2 (!) 89%.  Temp (24hrs), Av.1 °F (36.7 °C), Min:97.7 °F (36.5 °C), Max:98.4 °F (36.9 °C)    Wt Readings from Last 3 Encounters:   10/31/24 159 lb 9.8 oz (72.4 kg)   10/28/24 170 lb (77.1 kg)   24 172 lb (78 kg)       NAD  PERRLA/EOMI  Neck veins not elevated  Carotids- no bruits  CTA bilaterally  Cardiac- irreg irreg  Abdomen- Soft,Nontender, normal BS  Extremities- pulses normal  Edema- no edema  Mood /Affect Congruent  Skin- no lesions            Lab/Radiology Results     Recent Labs   Lab 10/28/24  1920 10/31/24  0638 24  0259   GLU 97 209* 109*   BUN 19 13 10   CREATSERUM 1.17 1.49* 0.97   EGFRCR 65 48* 81   CA 9.4 9.2 8.8   * 136 138   K 3.9 3.9 3.9    105 110   CO2 22.0 22.0 26.0     Recent Labs   Lab 10/28/24  1920 10/31/24  0637 24  0259   RBC 4.07 4.68 4.09   HGB 13.8 16.1 13.6   HCT 39.8 46.7 40.2   MCV 97.8 99.8 98.3   MCH 33.9 34.4* 33.3   MCHC 34.7 34.5 33.8   RDW 13.1 13.3 13.2   NEPRELIM 7.54 7.81* 6.96   WBC 10.9 13.5* 10.9   .0 291.0 193.0         [unfilled]  No results for input(s): \"BNP\" in the last 168 hours.  Lab Results   Component Value Date    INR 1.21 (H) 10/31/2024     No results found for: \"TROP\"      No results found.   EKG    Result Date: 2024  Ventricular tachycardia (ventricular or supraventricular with aberration) Nonspecific intraventricular block Biventricular hypertrophy Inferior infarct , age undetermined Anterolateral infarct , age undetermined Abnormal ECG When compared with ECG of 28-OCT-2024 20:49, Wide QRS tachycardia has replaced Sinus rhythm Vent. rate has increased  BPM Confirmed by LISA Naylor, Shitd ([id number]) on 2024 10:22:53 AM    EKG    Result Date: 10/31/2024  Sinus tachycardia with occasional Premature ventricular complexes Incomplete right bundle branch block Septal infarct , age undetermined Abnormal ECG When compared with ECG of 31-OCT-2024 06:32, Sinus rhythm has replaced Wide QRS  tachycardia Vent. rate has decreased  BPM       Problem List     Patient Active Problem List   Diagnosis    Ascending aortic aneurysm (HCC)    Hypertrophic cardiomyopathy (HCC)    PAF (paroxysmal atrial fibrillation) (HCC)    Abdominal aortic atherosclerosis (HCC)    Emphysema lung (HCC)    Atrial fibrillation with rapid ventricular response (HCC)    Wide-complex tachycardia       Diagnostic Testing     ECG AFL- atypical RBBB with 1:1  TTE HCM, normal EF  Stress Testing   EP Procedures See HPI    Assessment     Atypical AFL after multiple endocardial ablations  HCM  Elevated troponin        Plan     Discussed care with patient and his wife. They prefer waiting here and doing the RIMA/DCCV as an inpatient.  Will likely need another ablation.   Med options are dofetilide or Amio.   Outpt cath, which would need to be deferred one month post DCCV  Continue Xarelto  Would not be overly aggressive with rate control over the weekend since he will be converted on Monday and is asymptomatic.         C5- EP consult        Amber Lynch MD    Cardiac Electrophysiology  [facility]  11/1/2024  2:33 PM         [1]   Allergies  Allergen Reactions    Demerol Hcl [Meperidine] HIVES    Penicillins UNKNOWN and OTHER (SEE COMMENTS)     Other reaction(s): Other - See Comments  Unknown

## 2024-11-02 PROCEDURE — 99232 SBSQ HOSP IP/OBS MODERATE 35: CPT | Performed by: INTERNAL MEDICINE

## 2024-11-02 RX ORDER — METOPROLOL SUCCINATE 50 MG/1
50 TABLET, EXTENDED RELEASE ORAL ONCE
Status: COMPLETED | OUTPATIENT
Start: 2024-11-02 | End: 2024-11-02

## 2024-11-02 RX ORDER — METOPROLOL SUCCINATE 100 MG/1
100 TABLET, EXTENDED RELEASE ORAL
Status: DISCONTINUED | OUTPATIENT
Start: 2024-11-02 | End: 2024-11-03

## 2024-11-02 NOTE — PROGRESS NOTES
[facility]   part of [facility]     Hospitalist Progress Note     Enoch Walker Patient Status:  Observation    1945 MRN [id number]   Location [facility] 2NE-A Attending Dony Stern,    Hosp Day # 0 PCP Joseph Powell MD     Chief Complaint: Palpitations, chest pain    Subjective:     Patient further chest pain, palpitations or SOB.    Objective:    Review of Systems:   A comprehensive review of systems was completed; pertinent positive and negatives stated in subjective.    Vital signs:  Temp:  [98.1 °F (36.7 °C)-98.7 °F (37.1 °C)] 98.2 °F (36.8 °C)  Pulse:  [] 131  Resp:  [17-20] 18  BP: (121-135)/() 121/98  SpO2:  [89 %-97 %] 91 %    Physical Exam:    eneral: No acute distress, awake and alert  Respiratory: No rhonchi, no wheezes  Cardiovascular: S1, S2. IRIR  Abdomen: Soft, Non-tender, non-distended, positive bowel sounds  Extremities: No edema    Diagnostic Data:    Labs:  Recent Labs   Lab 10/28/24  1920 10/31/24  0637 11/01/24  025   WBC 10.9 13.5* 10.9   HGB 13.8 16.1 13.6   MCV 97.8 99.8 98.3   .0 291.0 193.0   INR  --  1.21*  --      Recent Labs   Lab 10/28/24  1920 10/31/24  0638 11/01/24  025   GLU 97 209* 109*   BUN 19 13 10   CREATSERUM 1.17 1.49* 0.97   CA 9.4 9.2 8.8   ALB 3.7 3.5  --    * 136 138   K 3.9 3.9 3.9    105 110   CO2 22.0 22.0 26.0   ALKPHO 60 63  --    AST 21 21  --    ALT 23 30  --    BILT 0.5 0.5  --    TP 7.0 7.2  --      Estimated Creatinine Clearance: 66.3 mL/min (based on SCr of 0.97 mg/dL).    Recent Labs   Lab 10/31/24  0638 10/31/24  1951 11/01/24  0259   TROPHS 95* 545* 356*     Recent Labs   Lab 10/31/24  0637   PTP 15.1*   INR 1.21*      Microbiology  No results found for this visit on 10/31/24.    Imaging: Reviewed in Epic.    Medications:    metoprolol succinate  100 mg Oral 2x Daily(Beta Blocker)    metoprolol succinate  50 mg Oral Once    aspirin  81 mg Oral Daily    rivaroxaban  20 mg Oral Daily with food     nicotine  1 patch Transdermal Daily       Assessment & Plan:      #WCT, aflutter RVR with RBBB vs ventricular tachyardia  -Spontaneously converted in ER before cardioversion but then flipped into afib RVR    #Paroxysmal atrial flutter/fibrillation s/p prior ablations  -TSH normal  -Telemetry  -Resume home xarelto   -BB  -Plan for RIMA/DCCV Monday     #Elevated troponin due to above  -Per cardiology  -Echo shows hyperdynamic EF c/w hypertrophic cardiomyopathy  -C one month after DCCV as outpatient     #HOCM  -Echo reviewed     #Ascending aortic aneurysm  -3.8 cm on echo     #Current smoker  -1.5 PPD for 50 years  -Nicotine patch      #SHAREE  -Resolved     #Leukocytosis, possibly reactive  -CXR without acute abnormality  -Resolved      Dony Stern DO    Supplementary Documentation:     Quality:  DVT Mechanical Prophylaxis:   SCDs,    DVT Pharmacologic Prophylaxis   Medication    rivaroxaban (Xarelto) tab 20 mg      DVT Pharmacologic prophylaxis: Aspirin 162 mg     Code Status: Full  Teague: No urinary catheter in place  SEAN: 11/4/2024    Discharge is dependent on: Clinical state, consultant recs  At this point Mr. Walker is expected to be discharge to: Home    The 21st Century Cures Act makes medical notes like these available to patients in the interest of transparency. Please be advised this is a medical document. Medical documents are intended to carry relevant information, facts as evident, and the clinical opinion of the practitioner. The medical note is intended as peer to peer communication and may appear blunt or direct. It is written in medical language and may contain abbreviations or verbiage that are unfamiliar.

## 2024-11-02 NOTE — PLAN OF CARE
Assumed care of patient at 0730. Patient is A/Ox4. On RA. Denies any pain or shortness of breath. Cardiac wise he is afib rhythm. Continent. Up ad lauren. Call light in reach. No questions or concerns at this time.     Problem: Patient/Family Goals  Goal: Patient/Family Long Term Goal  Description: Patient's Long Term Goal: to go home    Interventions:  - cardiology to see  -monitor HR  - See additional Care Plan goals for specific interventions  Outcome: Progressing  Goal: Patient/Family Short Term Goal  Description: Patient's Short Term Goal: to feel better    Interventions:   - meds as ordered  -echo  - See additional Care Plan goals for specific interventions  Outcome: Progressing

## 2024-11-02 NOTE — PROGRESS NOTES
Progress Note  Enoch Walker Patient Status:  Observation    1945 MRN [id number]   Location [facility] 2NE-A Attending Dony Stern, DO   Hosp Day # 0 PCP Joseph Powell MD     Subjective:  Patient remains asymptomatic    Objective:  BP (!) 121/98 (BP Location: Left arm)   Pulse (!) 131   Temp 98.2 °F (36.8 °C) (Oral)   Resp 18   Ht 5' 10\" (1.778 m)   Wt 159 lb 9.8 oz (72.4 kg)   SpO2 91%   BMI 22.90 kg/m²     Telemetry: aflutter, -130      Intake/Output: +3020        Last 3 Weights   10/31/24 1258 159 lb 9.8 oz (72.4 kg)   10/31/24 0635 168 lb (76.2 kg)   10/28/24 1907 170 lb (77.1 kg)   24 0807 172 lb (78 kg)       Labs:  Recent Labs   Lab 10/28/24  1920 10/31/24  0638 11/01/24  0259   GLU 97 209* 109*   BUN 19 13 10   CREATSERUM 1.17 1.49* 0.97   EGFRCR 65 48* 81   CA 9.4 9.2 8.8   ALB 3.7 3.5  --    * 136 138   K 3.9 3.9 3.9    105 110   CO2 22.0 22.0 26.0   ALKPHO 60 63  --    AST 21 21  --    ALT 23 30  --    BILT 0.5 0.5  --    TP 7.0 7.2  --      Recent Labs   Lab 10/28/24  1920 10/31/24  0637 11/01/24  0259   RBC 4.07 4.68 4.09   HGB 13.8 16.1 13.6   HCT 39.8 46.7 40.2   MCV 97.8 99.8 98.3   MCH 33.9 34.4* 33.3   MCHC 34.7 34.5 33.8   RDW 13.1 13.3 13.2   NEPRELIM 7.54 7.81* 6.96   WBC 10.9 13.5* 10.9   .0 291.0 193.0         Recent Labs   Lab 10/31/24  0638 10/31/24  1951 11/01/24  0259   TROPHS 95* 545* 356*     Lab Results   Component Value Date    INR 1.21 (H) 10/31/2024       Diagnostics:   Echo:   Conclusions:     1. Left ventricle: The cavity size was below normal. Wall thickness was      mildly to moderately increased with assymetric hypertrophy of the septum.      The appearance was consistent with hypertrophic cardiomyopathy. Systolic      function was hyperdynamic. The estimated ejection fraction was 75-80%, by      visual assessment. Unable to assess LV diastolic function due to heart      rhythm.   2. Aortic root: The aortic root was  3.9cm diameter.   3. Left atrium: The left atrial volume was normal.   4. Right ventricle: The cavity size was normal. Systolic function was      hyperdynamic.   Impressions:  This study is compared with previous dated 1/3/18:   *       Review of Systems   Constitutional: Negative. Negative for malaise/fatigue.   Cardiovascular:  Positive for irregular heartbeat.   Respiratory: Negative.         Physical Exam:    Gen: Alert, oriented x 3, in no apparent distress  Heent: Pupils equal, reactive. Mucous membranes moist.   Cardiac: irregular rate and rhythm, normal S1,S2, tachy  Lungs: Clear to auscultation  Abd: Soft, non tender, non distended  Ext: No edema  Skin: Warm, dry          Medications:     metoprolol succinate  50 mg Oral 2x Daily(Beta Blocker)    aspirin  81 mg Oral Daily    rivaroxaban  20 mg Oral Daily with food    nicotine  1 patch Transdermal Daily             Assessment:  WCT - atrial fibrillation with RVR with 1:1 conduction and RBBB   Paroxysmal atrial fibrillation , atrial flutter - -130  s/p PVI with cryoballoon ablation 3/19/18  s/p Recurrence RIMA/DCCV 12/16/18   s/p redo PVI with cryoballoon, posterior wall isolation, and SVC isolation 1/28/19  induced during EP study   s/p cavotricuspid isthmus ablation on 3/19/18  s/p empiric roof line and anterior mitral isthmus ablation 1/28/19  On Toprol 50 mg po BID. On xarelto  HOCM  Most recent echo 3/2/2023 with EF 61%, 1.8 cm hypertrophy of the septum  Troponin elevation, most likely secondary to demand ischemia  95 > 585 > 356  Echo with LVEF 75-80%, no WMA  Plans for outpatient ischemic evaluation  Dilated ascending aorta  Most recent echo 3/2/2023 4.3 cm  Smoker  1.5 PPD for more than 50 years  Lung nodules    Plan:  Continue BB. Per Dr. Lynch - Would not be overly aggressive with rate control over the weekend since he will be converted on Monday and is asymptomatic   Continue xarelto  Will plan on RIMA/DCCV on Monday  Will need eventual [facility]  one month after DCCV    Plan of care discussed with patient, RN.    AYDEE Noe  11/2/2024  9:41 AM      =======================================================  Patient seen and examined independently.  Note reviewed and labs reviewed.  Agree with above assessment and plan.    Physical exam:  GEN: Alert and orient, no acute distress  CV: tachy and irregular, S1S2, no m/g/r  LUNGS: CTA b/l with no obvious wheezing, rales or rhonchi  EXT: no b/l LE edema  ABD: soft, NTND    I have personally performed the medical decision making in its entirety. My additions include:  Reviewed chart and discussed with patient, wife (retired RN) and daughter at bedside.  Recommend definitive evaluation for any obstructive CAD with tobacco abuse history, chest pain, +trop and WCT tachycardia with LHC prior to consideration of RIMA/DCCV.  Patient, wife and daughter in agreement.  Will plan for LHC Monday and than consider RIMA/DCCV Tuesday.    Patient was consented for left heart cardiac catheterization. The risks and benefits of the procedure were explained to the patient. 1-2% risk of coronary angiography, possible angioplasty, include, but are not limited to stroke, death, heart attack, coronary dissection requiring emergent CABG, hematoma, bleeding, allergic reaction to medications, vascular damage requiring surgical repair, kidney failure requiring dialysis and others. Patient wishes to proceed.    D/w AYDEE Rangel, patient, wife and daughter at bedside.    Willy Christiansen MD

## 2024-11-02 NOTE — PLAN OF CARE
Assumed patient care at 1930.  Alert and oriented x 4. Resting in bed.  Vital signs are stable. Telemetry monitoring in progress. A-fib on monitor, rates  110-130 at rest. Denies chest pain , palpitations or shortness of breath.  Plan of care discussed  regarding evening medications, fall prevention measures, routine vital sign monitoring.  Possible RIMA/Cardioversion on Monday..  Problem: Patient/Family Goals  Goal: Patient/Family Long Term Goal  Description: Patient's Long Term Goal: to go home    Interventions:  - cardiology to see  -monitor HR  - See additional Care Plan goals for specific interventions  10/31/2024 2113 by Maikel Arguello RN  Outcome: Progressing  10/31/2024 2049 by Maikel Arguello RN  Outcome: Progressing  Goal: Patient/Family Short Term Goal  Description: Patient's Short Term Goal: to feel better    Interventions:   - meds as ordered  -echo  - See additional Care Plan goals for specific interventions  10/31/2024 2113 by Maikel Arguello RN  Outcome: Progressing  10/31/2024 2049 by Maikel Arguello RN  Outcome: Progressing     Problem: CARDIOVASCULAR - ADULT  Goal: Maintains optimal cardiac output and hemodynamic stability  Description: INTERVENTIONS:  - Monitor vital signs, rhythm, and trends  - Monitor for bleeding, hypotension and signs of decreased cardiac output  - Evaluate effectiveness of vasoactive medications to optimize hemodynamic stability  - Monitor arterial and/or venous puncture sites for bleeding and/or hematoma  - Assess quality of pulses, skin color and temperature  - Assess for signs of decreased coronary artery perfusion - ex. Angina  - Evaluate fluid balance, assess for edema, trend weights  10/31/2024 2113 by Maikel Arguello RN  Outcome: Progressing  10/31/2024 2049 by Maikel Arguello RN  Outcome: Progressing  Goal: Absence of cardiac arrhythmias or at baseline  Description: INTERVENTIONS:  - Continuous cardiac monitoring, monitor vital signs, obtain  12 lead EKG if indicated  - Evaluate effectiveness of antiarrhythmic and heart rate control medications as ordered  - Initiate emergency measures for life threatening arrhythmias  - Monitor electrolytes and administer replacement therapy as ordered  10/31/2024 2113 by Maikel Arguello, RN  Outcome: Progressing  10/31/2024 2049 by Maikel Arguello, RN  Outcome: Progressing

## 2024-11-03 PROCEDURE — 99232 SBSQ HOSP IP/OBS MODERATE 35: CPT | Performed by: INTERNAL MEDICINE

## 2024-11-03 RX ORDER — DILTIAZEM HYDROCHLORIDE 30 MG/1
30 TABLET, FILM COATED ORAL EVERY 6 HOURS SCHEDULED
Status: DISCONTINUED | OUTPATIENT
Start: 2024-11-03 | End: 2024-11-03

## 2024-11-03 RX ORDER — METOPROLOL TARTRATE 50 MG
50 TABLET ORAL
Status: DISCONTINUED | OUTPATIENT
Start: 2024-11-03 | End: 2024-11-04

## 2024-11-03 RX ORDER — ASPIRIN 81 MG/1
324 TABLET, CHEWABLE ORAL ONCE
Status: COMPLETED | OUTPATIENT
Start: 2024-11-04 | End: 2024-11-04

## 2024-11-03 RX ORDER — SODIUM CHLORIDE 9 MG/ML
INJECTION, SOLUTION INTRAVENOUS
Status: DISCONTINUED | OUTPATIENT
Start: 2024-11-04 | End: 2024-11-04

## 2024-11-03 NOTE — PLAN OF CARE
Patient alert and oriented; VSS; cardiac monitor showing afib/aflutter with RVR, rates 120-150's- AYDEE PACKER and Dr Christiansen aware; no edema; continent of bowel and bladder with LBM this morning; patient up independently but not ambulating in the hallway due to HR; patient did say he had \"discomfort\" to his mid chest but no pain or pressure and he felt hot, but went away quickly- AYDEE PACKER and Dr Christiansen aware; patient's wife updated on plan of care.     1610: Patient's HR going up to upper 150-160's, did give diltiazem now and will give metoprolol at 1700 per AYDEE Lin S.     1715: Patient converted to SR at 1644, 12 lead EKG done to confirm. Informed AYDEE PACKER, received order to discontinue cardizem, and keep metoprolol order as is.     1800: Blood pressure 98/76, informed AYDEE PACKER. Received order to discontinue metoprolol succinate and order lopressor.      Problem: Patient/Family Goals  Goal: Patient/Family Long Term Goal  Description: Patient's Long Term Goal: to go home    Interventions:  - cardiology to see  -monitor HR  - See additional Care Plan goals for specific interventions  Outcome: Progressing  Goal: Patient/Family Short Term Goal  Description: Patient's Short Term Goal: to feel better    Interventions:   - meds as ordered  -echo  - See additional Care Plan goals for specific interventions  Outcome: Progressing     Problem: CARDIOVASCULAR - ADULT  Goal: Maintains optimal cardiac output and hemodynamic stability  Description: INTERVENTIONS:  - Monitor vital signs, rhythm, and trends  - Monitor for bleeding, hypotension and signs of decreased cardiac output  - Evaluate effectiveness of vasoactive medications to optimize hemodynamic stability  - Monitor arterial and/or venous puncture sites for bleeding and/or hematoma  - Assess quality of pulses, skin color and temperature  - Assess for signs of decreased coronary artery perfusion - ex. Angina  - Evaluate fluid balance, assess for edema, trend  weights  Outcome: Progressing  Goal: Absence of cardiac arrhythmias or at baseline  Description: INTERVENTIONS:  - Continuous cardiac monitoring, monitor vital signs, obtain 12 lead EKG if indicated  - Evaluate effectiveness of antiarrhythmic and heart rate control medications as ordered  - Initiate emergency measures for life threatening arrhythmias  - Monitor electrolytes and administer replacement therapy as ordered  Outcome: Progressing

## 2024-11-03 NOTE — PLAN OF CARE
No acute events overnight,HR remains in 130's . Patient denies palpitations, chest pain or tightness  Metoprolol 100 mg given this am.

## 2024-11-03 NOTE — PLAN OF CARE
Assumed patient care at 1930.  Alert and oriented x 4. Resting in bed.  Vital signs are stable. Telemetry monitoring in progress. A-fib on monitor, rates  130\" at rest. Denies chest pain , palpitations or shortness of breath.  Plan of care discussed  regarding evening medications, fall prevention measures, routine vital sign monitoring.  Possible RIMA/Cardioversion on Monday..  Problem: Patient/Family Goals  Goal: Patient/Family Long Term Goal  Description: Patient's Long Term Goal: to go home    Interventions:  - cardiology to see  -monitor HR  - See additional Care Plan goals for specific interventions  10/31/2024 2113 by Maikel Arguello RN  Outcome: Progressing  10/31/2024 2049 by Maikel Arguello RN  Outcome: Progressing  Goal: Patient/Family Short Term Goal  Description: Patient's Short Term Goal: to feel better    Interventions:   - meds as ordered  -echo  - See additional Care Plan goals for specific interventions  10/31/2024 2113 by Maikel Arguelol RN  Outcome: Progressing  10/31/2024 2049 by Maikel Arguello RN  Outcome: Progressing     Problem: CARDIOVASCULAR - ADULT  Goal: Maintains optimal cardiac output and hemodynamic stability  Description: INTERVENTIONS:  - Monitor vital signs, rhythm, and trends  - Monitor for bleeding, hypotension and signs of decreased cardiac output  - Evaluate effectiveness of vasoactive medications to optimize hemodynamic stability  - Monitor arterial and/or venous puncture sites for bleeding and/or hematoma  - Assess quality of pulses, skin color and temperature  - Assess for signs of decreased coronary artery perfusion - ex. Angina  - Evaluate fluid balance, assess for edema, trend weights  10/31/2024 2113 by Maikel Arguello RN  Outcome: Progressing  10/31/2024 2049 by Maikel Arguello RN  Outcome: Progressing  Goal: Absence of cardiac arrhythmias or at baseline  Description: INTERVENTIONS:  - Continuous cardiac monitoring, monitor vital signs, obtain 12  lead EKG if indicated  - Evaluate effectiveness of antiarrhythmic and heart rate control medications as ordered  - Initiate emergency measures for life threatening arrhythmias  - Monitor electrolytes and administer replacement therapy as ordered  10/31/2024 2113 by Maikel Arguello, RN  Outcome: Progressing  10/31/2024 2049 by Maikel Arguello, RN  Outcome: Progressing

## 2024-11-03 NOTE — PROGRESS NOTES
[facility]   part of [facility]     Hospitalist Progress Note     Enoch Walker Patient Status:  Observation    1945 MRN [id number]   Location [facility] 2NE-A Attending Dony Stern,    Hosp Day # 1 PCP Joseph Powell MD     Chief Complaint: Palpitations, chest pain    Subjective:     Patient denies chest pain or SOB. No palpitations.     Objective:    Review of Systems:   A comprehensive review of systems was completed; pertinent positive and negatives stated in subjective.    Vital signs:  Temp:  [97.5 °F (36.4 °C)-98.4 °F (36.9 °C)] 98 °F (36.7 °C)  Pulse:  [122-134] 133  Resp:  [16-20] 18  BP: (102-128)/(71-95) 110/83  SpO2:  [96 %-98 %] 97 %    Physical Exam:    eneral: No acute distress, awake and alert  Respiratory: No rhonchi, no wheezes  Cardiovascular: S1, S2. IRIR  Abdomen: Soft, Non-tender, non-distended, positive bowel sounds  Extremities: No edema    Diagnostic Data:    Labs:  Recent Labs   Lab 10/28/24  1920 10/31/24  0637 11/01/24  025   WBC 10.9 13.5* 10.9   HGB 13.8 16.1 13.6   MCV 97.8 99.8 98.3   .0 291.0 193.0   INR  --  1.21*  --      Recent Labs   Lab 10/28/24  1920 10/31/24  0638 11/01/24  025   GLU 97 209* 109*   BUN 19 13 10   CREATSERUM 1.17 1.49* 0.97   CA 9.4 9.2 8.8   ALB 3.7 3.5  --    * 136 138   K 3.9 3.9 3.9    105 110   CO2 22.0 22.0 26.0   ALKPHO 60 63  --    AST 21 21  --    ALT 23 30  --    BILT 0.5 0.5  --    TP 7.0 7.2  --      Estimated Creatinine Clearance: 66.3 mL/min (based on SCr of 0.97 mg/dL).    Recent Labs   Lab 10/31/24  0638 10/31/24  1951 11/01/24  0259   TROPHS 95* 545* 356*     Recent Labs   Lab 10/31/24  0637   PTP 15.1*   INR 1.21*      Microbiology  No results found for this visit on 10/31/24.    Imaging: Reviewed in Epic.    Medications:    metoprolol succinate  100 mg Oral 2x Daily(Beta Blocker)    aspirin  81 mg Oral Daily    [Held by provider] rivaroxaban  20 mg Oral Daily with food    nicotine  1 patch  Transdermal Daily       Assessment & Plan:      #WCT, aflutter RVR with RBBB vs ventricular tachyardia  -Spontaneously converted in ER before cardioversion but then flipped into afib RVR    #Paroxysmal atrial flutter/fibrillation s/p prior ablations  -TSH normal  -Telemetry  -Hold xarelto tomorrow  -BB  -Plan for RIMA/DCCV after [facility], likely Tuesday     #Elevated troponin due to above  -Per cardiology  -Echo shows hyperdynamic EF c/w hypertrophic cardiomyopathy  -LHC tomorrow     #HOCM  -Echo reviewed     #Ascending aortic aneurysm  -3.8 cm on echo     #Current smoker  -1.5 PPD for 50 years  -Nicotine patch      #SHAREE  -Resolved     #Leukocytosis, possibly reactive  -CXR without acute abnormality  -Resolved      Dony Stern,     Supplementary Documentation:     Quality:  DVT Mechanical Prophylaxis:   SCDs,    DVT Pharmacologic Prophylaxis   Medication    [Held by provider] rivaroxaban (Xarelto) tab 20 mg      DVT Pharmacologic prophylaxis: Aspirin 162 mg     Code Status: Full  Teague: No urinary catheter in place  SEAN: 2 days    Discharge is dependent on: Clinical state, consultant recs  At this point Mr. Walker is expected to be discharge to: Home    The 21st Century Cures Act makes medical notes like these available to patients in the interest of transparency. Please be advised this is a medical document. Medical documents are intended to carry relevant information, facts as evident, and the clinical opinion of the practitioner. The medical note is intended as peer to peer communication and may appear blunt or direct. It is written in medical language and may contain abbreviations or verbiage that are unfamiliar.

## 2024-11-03 NOTE — PROGRESS NOTES
Progress Note  Enoch Walker Patient Status:  Inpatient    1945 MRN [id number]   Location [facility] 2NE-A Attending Dony Stern,    Hosp Day # 1 PCP Joseph Powell MD     Subjective:  No complaints of chest pain or SOB    Objective:  /71 (BP Location: Left arm)   Pulse (!) 123   Temp 97.5 °F (36.4 °C) (Oral)   Resp 20   Ht 5' 10\" (1.778 m)   Wt 159 lb 9.8 oz (72.4 kg)   SpO2 97%   BMI 22.90 kg/m²     Telemetry: aflutter , 's      Intake/Output: 3739    Intake/Output Summary (Last 24 hours) at 11/3/2024 0840  Last data filed at 11/3/2024 0812  Gross per 24 hour   Intake 1080 ml   Output 1 ml   Net 1079 ml       Last 3 Weights   10/31/24 1258 159 lb 9.8 oz (72.4 kg)   10/31/24 0635 168 lb (76.2 kg)   10/28/24 1907 170 lb (77.1 kg)   24 0807 172 lb (78 kg)       Labs:  Recent Labs   Lab 10/28/24  1920 10/31/24  0638 24  0259   GLU 97 209* 109*   BUN 19 13 10   CREATSERUM 1.17 1.49* 0.97   EGFRCR 65 48* 81   CA 9.4 9.2 8.8   ALB 3.7 3.5  --    * 136 138   K 3.9 3.9 3.9    105 110   CO2 22.0 22.0 26.0   ALKPHO 60 63  --    AST 21 21  --    ALT 23 30  --    BILT 0.5 0.5  --    TP 7.0 7.2  --      Recent Labs   Lab 10/28/24  1920 10/31/24  0637 24  0259   RBC 4.07 4.68 4.09   HGB 13.8 16.1 13.6   HCT 39.8 46.7 40.2   MCV 97.8 99.8 98.3   MCH 33.9 34.4* 33.3   MCHC 34.7 34.5 33.8   RDW 13.1 13.3 13.2   NEPRELIM 7.54 7.81* 6.96   WBC 10.9 13.5* 10.9   .0 291.0 193.0         Recent Labs   Lab 10/31/24  0638 10/31/24  1951 11/01/24  0259   TROPHS 95* 545* 356*     Lab Results   Component Value Date    INR 1.21 (H) 10/31/2024       Diagnostics:     Review of Systems   Constitutional: Negative.   Cardiovascular:  Positive for irregular heartbeat.   Respiratory: Negative.         Physical Exam:    Gen: Alert, oriented x 3, in no apparent distress  Heent: Pupils equal, reactive. Mucous membranes moist.   Cardiac: irregular rate and rhythm, normal  S1,S2  Lungs: Clear to auscultation  Abd: Soft, non tender, non distended  Ext: No edema  Skin: Warm, dry          Medications:     metoprolol succinate  100 mg Oral 2x Daily(Beta Blocker)    benzocaine  1 spray Mouth/Throat See Admin Instructions    aspirin  81 mg Oral Daily    rivaroxaban  20 mg Oral Daily with food    nicotine  1 patch Transdermal Daily             Assessment:  WCT - atrial fibrillation with RVR with 1:1 conduction and RBBB   Paroxysmal atrial fibrillation , atrial flutter - -130  s/p PVI with cryoballoon ablation 3/19/18  s/p Recurrence RIMA/DCCV 12/16/18   s/p redo PVI with cryoballoon, posterior wall isolation, and SVC isolation 1/28/19  induced during EP study   s/p cavotricuspid isthmus ablation on 3/19/18  s/p empiric roof line and anterior mitral isthmus ablation 1/28/19  On Toprol 100 mg po BID. On xarelto  HOCM  Most recent echo 3/2/2023 with EF 61%, 1.8 cm hypertrophy of the septum  Troponin elevation, most likely secondary to demand ischemia  95 > 585 > 356  Echo with LVEF 75-80%, no WMA  Dilated ascending aorta  Most recent echo 3/2/2023 4.3 cm  Smoker  1.5 PPD for more than 50 years  Lung nodules    Plan:  Continue BB. Per Dr. Lynch - Would not be overly aggressive with rate control over the weekend since he will be converted on Monday and is asymptomatic   Will plan on [facility] tomorrow for definitive evaluation of troponin and chest pain  Then will consider RIMA/DCCV on Tuesday  Hold xarelto tomorrow.     Plan of care discussed with patient, RN.    AYDEE Noe  11/3/2024  8:40 AM    =======================================================  Patient seen and examined independently.  Note reviewed and labs reviewed.  Agree with above assessment and plan.     Physical exam:  GEN: Alert and orient, no acute distress  CV: tachy and irregular, S1S2, no m/g/r  LUNGS: CTA b/l with no obvious wheezing, rales or rhonchi  EXT: no b/l LE edema  ABD: soft, NTND     I have personally  performed the medical decision making in its entirety. My additions include:  Reviewed chart and discussed with patient, wife (retired RN) and daughter at bedside.  Recommend definitive evaluation for any obstructive CAD with tobacco abuse history, chest pain, +trop and WCT tachycardia with LHC prior to consideration of RIMA/DCCV.  Patient, wife and daughter in agreement.  Will plan for LHC tomorrow and than consider RIMA/DCCV Tuesday.     Patient was consented for left heart cardiac catheterization. The risks and benefits of the procedure were explained to the patient. 1-2% risk of coronary angiography, possible angioplasty, include, but are not limited to stroke, death, heart attack, coronary dissection requiring emergent CABG, hematoma, bleeding, allergic reaction to medications, vascular damage requiring surgical repair, kidney failure requiring dialysis and others. Patient wishes to proceed.     D/w AYDEE Rangel, patient, wife and daughter at bedside.     Willy Christiansen MD

## 2024-11-04 VITALS
DIASTOLIC BLOOD PRESSURE: 64 MMHG | HEIGHT: 70 IN | RESPIRATION RATE: 20 BRPM | SYSTOLIC BLOOD PRESSURE: 104 MMHG | TEMPERATURE: 98 F | WEIGHT: 159.63 LBS | BODY MASS INDEX: 22.85 KG/M2 | HEART RATE: 62 BPM | OXYGEN SATURATION: 94 %

## 2024-11-04 LAB
ATRIAL RATE: 119 BPM
P AXIS: 89 DEGREES
P-R INTERVAL: 168 MS
Q-T INTERVAL: 354 MS
QRS DURATION: 102 MS
QTC CALCULATION (BEZET): 497 MS
R AXIS: 16 DEGREES
T AXIS: 34 DEGREES
VENTRICULAR RATE: 119 BPM

## 2024-11-04 PROCEDURE — 99239 HOSP IP/OBS DSCHRG MGMT >30: CPT | Performed by: INTERNAL MEDICINE

## 2024-11-04 RX ORDER — ATORVASTATIN CALCIUM 20 MG/1
20 TABLET, FILM COATED ORAL NIGHTLY
Qty: 30 TABLET | Refills: 3 | Status: SHIPPED | OUTPATIENT
Start: 2024-11-04

## 2024-11-04 RX ORDER — AMIODARONE HYDROCHLORIDE 200 MG/1
400 TABLET ORAL 2 TIMES DAILY WITH MEALS
Status: DISCONTINUED | OUTPATIENT
Start: 2024-11-04 | End: 2024-11-04

## 2024-11-04 RX ORDER — ATORVASTATIN CALCIUM 20 MG/1
20 TABLET, FILM COATED ORAL NIGHTLY
Status: DISCONTINUED | OUTPATIENT
Start: 2024-11-04 | End: 2024-11-04

## 2024-11-04 RX ORDER — AMIODARONE HYDROCHLORIDE 400 MG/1
TABLET ORAL
Qty: 59 TABLET | Refills: 0 | Status: SHIPPED | OUTPATIENT
Start: 2024-11-04 | End: 2025-02-09

## 2024-11-04 NOTE — PLAN OF CARE
Pt. Is alert and oriented times 4. NSR on tele. On. RA O2 in the 90's. Pt. Denies chest pain or shortness of breath. Pt. Is up ad lauren continent of bowel and bladder. Groin sites prepped. Consent in chart.   Contacted CHARIS BAJWA this AM. Per AYDEE samuel to receive the 325 aspirin and eat a light breakfast. Pt. Ate a light breakfast at around 9:30 AM. Pt. Updated on plan of care. Call light within reach  Problem: Patient/Family Goals  Goal: Patient/Family Long Term Goal  Description: Patient's Long Term Goal: to go home    Interventions:  - cardiology to see  -monitor HR  - See additional Care Plan goals for specific interventions  Outcome: Progressing  Goal: Patient/Family Short Term Goal  Description: Patient's Short Term Goal: to feel better    Interventions:   - meds as ordered  -echo  - See additional Care Plan goals for specific interventions  Outcome: Progressing     Problem: CARDIOVASCULAR - ADULT  Goal: Maintains optimal cardiac output and hemodynamic stability  Description: INTERVENTIONS:  - Monitor vital signs, rhythm, and trends  - Monitor for bleeding, hypotension and signs of decreased cardiac output  - Evaluate effectiveness of vasoactive medications to optimize hemodynamic stability  - Monitor arterial and/or venous puncture sites for bleeding and/or hematoma  - Assess quality of pulses, skin color and temperature  - Assess for signs of decreased coronary artery perfusion - ex. Angina  - Evaluate fluid balance, assess for edema, trend weights  Outcome: Progressing  Goal: Absence of cardiac arrhythmias or at baseline  Description: INTERVENTIONS:  - Continuous cardiac monitoring, monitor vital signs, obtain 12 lead EKG if indicated  - Evaluate effectiveness of antiarrhythmic and heart rate control medications as ordered  - Initiate emergency measures for life threatening arrhythmias  - Monitor electrolytes and administer replacement therapy as ordered  Outcome: Progressing

## 2024-11-04 NOTE — PLAN OF CARE
Patient AOX4. O2 sat > 90% on room air. NSR on tele. VSS/. Denies chest pain or shortness of breath. Plan for C in a.m. Groins prepped, consent signed. NPO after midnight. Patient updated on plan of care.                               Problem: Patient/Family Goals  Goal: Patient/Family Long Term Goal  Description: Patient's Long Term Goal: to go home    Interventions:  - cardiology to see  -monitor HR  - See additional Care Plan goals for specific interventions  Outcome: Progressing  Goal: Patient/Family Short Term Goal  Description: Patient's Short Term Goal: to feel better    Interventions:   - meds as ordered  -echo  - See additional Care Plan goals for specific interventions  Outcome: Progressing     Problem: CARDIOVASCULAR - ADULT  Goal: Maintains optimal cardiac output and hemodynamic stability  Description: INTERVENTIONS:  - Monitor vital signs, rhythm, and trends  - Monitor for bleeding, hypotension and signs of decreased cardiac output  - Evaluate effectiveness of vasoactive medications to optimize hemodynamic stability  - Monitor arterial and/or venous puncture sites for bleeding and/or hematoma  - Assess quality of pulses, skin color and temperature  - Assess for signs of decreased coronary artery perfusion - ex. Angina  - Evaluate fluid balance, assess for edema, trend weights  Outcome: Progressing  Goal: Absence of cardiac arrhythmias or at baseline  Description: INTERVENTIONS:  - Continuous cardiac monitoring, monitor vital signs, obtain 12 lead EKG if indicated  - Evaluate effectiveness of antiarrhythmic and heart rate control medications as ordered  - Initiate emergency measures for life threatening arrhythmias  - Monitor electrolytes and administer replacement therapy as ordered  Outcome: Progressing

## 2024-11-04 NOTE — PROGRESS NOTES
Progress Note  Enoch Walker Patient Status:  Inpatient    1945 MRN [id number]   Location [facility] 2NE-A Attending Dony Stern, DO   Hosp Day # 2 PCP Joseph Powell MD     Subjective:  No acute events overnight.  Resting in bed this afternoon, denies any cardiac symptoms currently.  Sustaining SR per tele review since yesterday afternoon.       Objective:  /72 (BP Location: Left arm)   Pulse 61   Temp 97.7 °F (36.5 °C) (Oral)   Resp 20   Ht 5' 10\" (1.778 m)   Wt 159 lb 9.8 oz (72.4 kg)   SpO2 96%   BMI 22.90 kg/m²     Telemetry: SR, SB, HR 60s      Intake/Output:    Intake/Output Summary (Last 24 hours) at 2024 1153  Last data filed at 11/3/2024 2140  Gross per 24 hour   Intake 1140 ml   Output --   Net 1140 ml       Last 3 Weights   10/31/24 1258 159 lb 9.8 oz (72.4 kg)   10/31/24 0635 168 lb (76.2 kg)   10/28/24 1907 170 lb (77.1 kg)   24 0807 172 lb (78 kg)       Labs:  Recent Labs   Lab 10/28/24  1920 10/31/24  0638 24  0259   GLU 97 209* 109*   BUN 19 13 10   CREATSERUM 1.17 1.49* 0.97   EGFRCR 65 48* 81   CA 9.4 9.2 8.8   * 136 138   K 3.9 3.9 3.9    105 110   CO2 22.0 22.0 26.0     Recent Labs   Lab 10/28/24  1920 10/31/24  0637 24  0259   RBC 4.07 4.68 4.09   HGB 13.8 16.1 13.6   HCT 39.8 46.7 40.2   MCV 97.8 99.8 98.3   MCH 33.9 34.4* 33.3   MCHC 34.7 34.5 33.8   RDW 13.1 13.3 13.2   NEPRELIM 7.54 7.81* 6.96   WBC 10.9 13.5* 10.9   .0 291.0 193.0         Recent Labs   Lab 10/31/24  0638 10/31/24  1951 11/01/24  0259   TROPHS 95* 545* 356*       Review of Systems   Constitutional: Negative.   Cardiovascular: Negative.    Respiratory: Negative.         Physical Exam:    Gen: alert, oriented x 3, NAD  Heent: pupils equal, reactive. Mucous membranes moist.   Neck: no jvd  Cardiac: regular rate and rhythm, normal S1,S2, no murmur, gallop o rub   Lungs: CTA  Abd: soft, NT/ND +bs  Ext: no edema  Skin: Warm, dry  Neuro: No focal  deficits        Medications:     sodium chloride   Intravenous On Call    metoprolol tartrate  50 mg Oral 2x Daily(Beta Blocker)    [Held by provider] aspirin  81 mg Oral Daily    [Held by provider] rivaroxaban  20 mg Oral Daily with food    nicotine  1 patch Transdermal Daily       Assessment:  WCT - atrial fibrillation with RVR with 1:1 conduction and RBBB-converted back to SR on 11/3/24 and currently sustaining   Paroxysmal atrial fibrillation , atrial flutter -now in SR  s/p PVI with cryoballoon ablation 3/19/18  s/p Recurrence RIMA/DCCV 12/16/18   s/p redo PVI with cryoballoon, posterior wall isolation, and SVC isolation 1/28/19  induced during EP study   s/p cavotricuspid isthmus ablation on 3/19/18  s/p empiric roof line and anterior mitral isthmus ablation 1/28/19  On lopressor 50 mg po BID. On xarelto  HCM  Most recent echo 3/2/2023 with EF 61%, 1.8 cm hypertrophy of the septum  Troponin elevation, most likely secondary to demand ischemia  95 > 585 > 356  Echo with LVEF 75-80%, no WMA  Dilated ascending aorta  Most recent echo 3/2/2023 4.3 cm  Smoker  1.5 PPD for more than 50 years  Lung nodules    Plan:  Sustaining in SR per tele review since 11/3/24 around 16:00-will start on amiodarone load 400 mg bid x 7 days and taper down to 200 mg daily afterwards.  Continue bb, xarelto for stroke prevention.  Denies any chest pain, trop peaked at 585, TTE with preserved LVEF, no rwma-will plan for op LHC.  Plan to dc home today and follow up with Dr Carlos valerio in 1 week and EP Dr Lynch     Plan of care discussed with patient, RN.    Carmelita Brito, ABDULKADIR  11/4/2024  11:53 AM  [phone]        Physician addendum:  I have seen and examined the patient agree with note as written by APN above.    78-year-old with HCM, presenting with atrial flutter, converted to normal sinus yesterday.  On admission also had elevated troponin.  He was seen by EP recommending amiodarone initiation, and anticoagulation.  He is  stable for discharge.  We will discuss left heart cath as an outpatient.  Continue aspirin, high intensity statin, beta-blocker, amiodarone, therapeutic anticoagulation.    L2    Mendez Gonzalez MD  Interventional cardiology  [facility] [place]

## 2024-11-04 NOTE — DISCHARGE SUMMARY
[facility] HOSPITAL[facility]  DISCHARGE SUMMARY     Enoch Walker Patient Status:  Inpatient    1945 MRN [id number]   Location [facility] 2NE-A Attending Dony Stern, DO   Hosp Day # 2 PCP Joseph Powell MD     Date of Admission: 10/31/2024  Date of Discharge: 2024  Discharge Disposition: Home or Self Care    Discharge Diagnosis:   #Wide complex tachycardia due to afib RVR and RBBB  #Paroxysmal atrial fibrillation/flutter -now in SR   #Elevated troponin due to demand ischemia  #Dilated ascending aorta  #Smoker  #Hypertrophic cardiomyopathy  #SHAREE, resolved    History of Present Illness: Enoch Walker is a 78 year old male with PMHx paroxysmal atrial flutter/fibrillation s/p ablation, HOCM, ascending aortic aneurysm, smoker and depression who presents to the hospital with chest pain and palpitations that started at 2am. Chest pain was dull and there was no radiation of the pain. This happened to him 3 days ago and was seen in ER where he was found to be in afib RVR and was treated with cardizem. He was sent home on xarelto which he took the first dose this morning. However, after taking the xarelto, he vomiting a few hours later. Currently denies any nausea. No recent fever, diarrhea, abdominal pain or dysuria. No current chest pain. In the ER, he was found to be in WCT with 's. He was also hypotensive. Plan was for cardioversion but he converted to sinus and subsequently felt better. He then converted to atrial fibrillation and was subsequently admitted with cardiology on consult. He did receive a dose of full dose lovenox in ER.     Brief Synopsis: Patient presented with wide-complex tachycardia.  It was atrial flutter RVR with RBBB. He was going to have cardioversion due to hypotension in the ER but then he flipped out of it into atrial fibrillation with improved HR.  He was started on diltiazem drip and then switched over to oral metoprolol.  Elevated troponin was likely due to demand  ischemia.  Echo shows hyperdynamic EF consistent with known hypertrophic cardiomyopathy.  Plan was for cath and subsequent RIMA/DCCV but he converted to normal sinus rhythm.  Plan is to do outpatient cath.  He was discharged home in stable condition.    Lace+ Score: 66  59-90 High Risk  29-58 Medium Risk  0-28   Low Risk  Patient was referred to the [facility].    TCM Follow-Up Recommendation:  LACE > 58: High Risk of readmission after discharge from the hospital.    Procedures during hospitalization:   None    Incidental or significant findings and recommendations (brief descriptions):  None    Lab/Test results pending at Discharge:   None     Consultants:  Cardiology    Discharge Medication List:     Discharge Medications        START taking these medications        Instructions Prescription details   Amiodarone HCl 400 MG Tabs  Commonly known as: PACERONE  Start taking on: November 4, 2024      Take 1 tablet (400 mg total) by mouth 2 (two) times daily for 7 days, THEN 0.5 tablets (200 mg total) daily.   Quantity: 59 tablet  Refills: 0     atorvastatin 20 MG Tabs  Commonly known as: Lipitor      Take 1 tablet (20 mg total) by mouth nightly.   Quantity: 30 tablet  Refills: 3            CONTINUE taking these medications        Instructions Prescription details   aspirin 81 MG Chew      Chew 1 tablet (81 mg total) by mouth daily. 1 qd   Refills: 0     metoprolol succinate ER 25 MG Tb24  Commonly known as: Toprol XL      Take 1 tablet (25 mg total) by mouth daily.   Refills: 0     MULTIVITAMIN ADULT OR      Take by mouth.   Refills: 0     Xarelto 20 MG Tabs  Generic drug: rivaroxaban      Take 1 tablet (20 mg total) by mouth daily with food.   Refills: 0               Where to Get Your Medications        These medications were sent to [facility] - [place], [place] - [address] [address] [phone], [phone]  [address] [address]      Phone: [phone]   Amiodarone HCl 400 MG  Tabs  atorvastatin 20 MG Tabs         [place] No controlled substances prescribed at discharge.    Follow-up appointment:   Joseph Powell MD  [address]  [facility] [address]  [phone]    Schedule an appointment as soon as possible for a visit in 1 week(s)      Mendez Gonzalez MD  [address] [address]  [phone]    Follow up in 1 week(s)  Office will call you for follow up appt    Amber Lynch MD  [address] [address]  [phone]    Follow up in 2 week(s)  Office will call you for follow up appt    Appointments for Next 30 Days 11/4/2024 - 12/4/2024        Date Arrival Time Visit Type Length Department Provider     11/6/2024 12:00 PM  NON-TCM HOSPITAL FOLLOW UP [5060] 60 min [facility] Laurie Suarez APRN    Patient Instructions:         Location Instructions:     Masks are optional for all patients and visitors, unless otherwise indicated.                      Vital signs:  Temp:  [97.7 °F (36.5 °C)-98.2 °F (36.8 °C)] 97.7 °F (36.5 °C)  Pulse:  [] 62  Resp:  [16-20] 20  BP: ()/(64-84) 104/64  SpO2:  [94 %-96 %] 94 %    Physical Exam:    See progress note dated same day.  -----------------------------------------------------------------------------------------------  PATIENT DISCHARGE INSTRUCTIONS: See electronic chart    Dony Stern DO    Time spent:  32 minutes

## 2024-11-04 NOTE — PROGRESS NOTES
[facility]   part of [facility]     Hospitalist Progress Note     Enoch Walker Patient Status:  Observation    1945 MRN [id number]   Location [facility] 2NE-A Attending Dony Stern,    Hosp Day # 2 PCP Joseph Powell MD     Chief Complaint: Palpitations, chest pain    Subjective:     Patient converted to normal sinus rhythm yesterday. He denies chest pain or SOB.     Objective:    Review of Systems:   A comprehensive review of systems was completed; pertinent positive and negatives stated in subjective.    Vital signs:  Temp:  [97.5 °F (36.4 °C)-98.2 °F (36.8 °C)] 97.7 °F (36.5 °C)  Pulse:  [] 61  Resp:  [16-20] 18  BP: ()/(68-84) 104/72  SpO2:  [94 %-97 %] 96 %    Physical Exam:    eneral: No acute distress, awake and alert  Respiratory: No rhonchi, no wheezes  Cardiovascular: S1, S2. RRR  Abdomen: Soft, Non-tender, non-distended, positive bowel sounds  Extremities: No edema    Diagnostic Data:    Labs:  Recent Labs   Lab 10/28/24  1920 10/31/24  0637 11/01/24  0259   WBC 10.9 13.5* 10.9   HGB 13.8 16.1 13.6   MCV 97.8 99.8 98.3   .0 291.0 193.0   INR  --  1.21*  --      Recent Labs   Lab 10/28/24  1920 10/31/24  0638 11/01/24  0259   GLU 97 209* 109*   BUN 19 13 10   CREATSERUM 1.17 1.49* 0.97   CA 9.4 9.2 8.8   ALB 3.7 3.5  --    * 136 138   K 3.9 3.9 3.9    105 110   CO2 22.0 22.0 26.0   ALKPHO 60 63  --    AST 21 21  --    ALT 23 30  --    BILT 0.5 0.5  --    TP 7.0 7.2  --      Estimated Creatinine Clearance: 66.3 mL/min (based on SCr of 0.97 mg/dL).    Recent Labs   Lab 10/31/24  0638 10/31/24  1951 11/01/24  025   TROPHS 95* 545* 356*     Recent Labs   Lab 10/31/24  0637   PTP 15.1*   INR 1.21*      Microbiology  No results found for this visit on 10/31/24.    Imaging: Reviewed in Epic.    Medications:    sodium chloride   Intravenous On Call    aspirin  324 mg Oral Once    metoprolol tartrate  50 mg Oral 2x Daily(Beta Blocker)    [Held by  provider] aspirin  81 mg Oral Daily    [Held by provider] rivaroxaban  20 mg Oral Daily with food    nicotine  1 patch Transdermal Daily       Assessment & Plan:      #WCT, aflutter RVR with RBBB vs ventricular tachyardia  -Spontaneously converted in ER before cardioversion but then flipped into afib RVR. Now back in NSR    #Paroxysmal atrial flutter/fibrillation s/p prior ablations  -TSH normal  -Telemetry  -Hold xarelto for LHC  -BB  -Plan was for RIMA/DCCV after LHC but he converted to NSR     #Elevated troponin due to above  -Per cardiology  -Echo shows hyperdynamic EF c/w hypertrophic cardiomyopathy  -[facility] today     #HOCM  -Echo reviewed     #Ascending aortic aneurysm  -3.8 cm on echo     #Current smoker  -1.5 PPD for 50 years  -Nicotine patch      #SHAREE  -Resolved     #Leukocytosis, possibly reactive  -CXR without acute abnormality  -Resolved      Dony Stern,     Supplementary Documentation:     Quality:  DVT Mechanical Prophylaxis:   SCDs,    DVT Pharmacologic Prophylaxis   Medication    [Held by provider] rivaroxaban (Xarelto) tab 20 mg      DVT Pharmacologic prophylaxis: Aspirin 162 mg     Code Status: Full  Teague: No urinary catheter in place  SEAN: 0-1 day    Discharge is dependent on: Clinical state, consultant recs  At this point Mr. Walker is expected to be discharge to: Home    The 21st Century Cures Act makes medical notes like these available to patients in the interest of transparency. Please be advised this is a medical document. Medical documents are intended to carry relevant information, facts as evident, and the clinical opinion of the practitioner. The medical note is intended as peer to peer communication and may appear blunt or direct. It is written in medical language and may contain abbreviations or verbiage that are unfamiliar.

## 2024-11-05 ENCOUNTER — PATIENT OUTREACH (OUTPATIENT)
Dept: CASE MANAGEMENT | Age: 79
End: 2024-11-05

## 2024-11-05 ENCOUNTER — TELEPHONE (OUTPATIENT)
Dept: FAMILY MEDICINE CLINIC | Facility: CLINIC | Age: 79
End: 2024-11-05

## 2024-11-05 LAB
ATRIAL RATE: 74 BPM
P AXIS: 73 DEGREES
P-R INTERVAL: 168 MS
Q-T INTERVAL: 402 MS
QRS DURATION: 94 MS
QTC CALCULATION (BEZET): 446 MS
R AXIS: 33 DEGREES
T AXIS: 42 DEGREES
VENTRICULAR RATE: 74 BPM

## 2024-11-05 NOTE — TELEPHONE ENCOUNTER
Pt's wife calling- pt admitted to hospital on 10/31/24 for Afib. Discharged 11/4/24. Pt is to follow up with cardiology. Wife asking if pt needs to f/u with Dr. Powell? States he has a lot of appointments coming up and will come in if needed.

## 2024-11-05 NOTE — TELEPHONE ENCOUNTER
Patient's name and  verified   Patient wife( on verbal communication) patient is seeing Dr Lynch electrophysiologist next   Patient notified and verbalized an understanding

## 2024-11-05 NOTE — PROGRESS NOTES
LM for pt to call [facility] for TCM since discharge. NCM phone number was provided for pt to call back.    [facility] contact information was provided for pt to call back as well.

## 2024-11-06 ENCOUNTER — APPOINTMENT (OUTPATIENT)
Dept: CARDIOLOGY | Age: 79
End: 2024-11-06

## 2024-11-14 NOTE — H&P
Mendez Gonzalez MD   Physician  Cardiology     Consults     Addendum     Date of Service: 10/31/2024  3:45 PM     Expand All Collapse [facility] - CARDIOLOGY CONSULT NOTE           Enoch Walker Patient Status:  Observation    1945 MRN [id number]   Location [facility] 2NE-A Attending Dony Stenr DO   Hosp Day # 0 PCP Joseph Powell MD      Date of Admission:  10/31/2024  Date of Consult:  10/31/2024  I was asked by Dony Stern DO to provide recommendations for evaluation and management of cardiac issues.     Reason for Consultation:      Tachycardia     History of Present Illness:   Patient is a 78 year old male with history of atrial fibrillation status post remote ablation, has been good for the last couple years noted on Monday to have feelings of palpitations and went to the emergency room.  He was found to be in atrial fibrillation and converted back to normal sinus was discharged on metoprolol and Xarelto.  He had not started his medicines yet.  This morning had chest pain lightheadedness and palpitations and presented to the emergency room.  He was found to be in an atrial flutter.  Transferred over for further care.  Overall feels better his heart rates are in the 120s, his EKG shows atrial flutter 2-1.        Results:            Lab Results   Component Value Date     WBC 13.5 (H) 10/31/2024     HGB 16.1 10/31/2024     HCT 46.7 10/31/2024     .0 10/31/2024     CREATSERUM 1.49 (H) 10/31/2024     BUN 13 10/31/2024      10/31/2024     K 3.9 10/31/2024      10/31/2024     CO2 22.0 10/31/2024      (H) 10/31/2024     CA 9.2 10/31/2024     ALB 3.5 10/31/2024     ALKPHO 63 10/31/2024     BILT 0.5 10/31/2024     TP 7.2 10/31/2024     AST 21 10/31/2024     ALT 30 10/31/2024     PTT 33.3 10/31/2024     INR 1.21 (H) 10/31/2024     T4F 0.9 2020     TSH 3.733 10/31/2024     B12 434 2016         XR CHEST AP PORTABLE  (CPT=71045)     Result Date:  10/31/2024  CONCLUSION:  No active cardiopulmonary process identified.   LOCATION:  [facility]      Dictated by (CST): Robbie Tang MD on 10/31/2024 at 7:23 AM     Finalized by (CST): Robbie Tang MD on 10/31/2024 at 7:24 AM      EKG     Result Date: 10/31/2024  Sinus tachycardia with occasional Premature ventricular complexes Incomplete right bundle branch block Septal infarct , age undetermined Abnormal ECG When compared with ECG of 31-OCT-2024 06:32, Sinus rhythm has replaced Wide QRS tachycardia Vent. rate has decreased  BPM     EKG     Result Date: 10/31/2024  Wide QRS tachycardia Nonspecific intraventricular block Biventricular hypertrophy Inferior infarct , age undetermined Anterolateral infarct , age undetermined Abnormal ECG When compared with ECG of 28-OCT-2024 20:49, Wide QRS tachycardia has replaced Sinus rhythm Vent. rate has increased  BPM      Past Medical History  Past Medical History       Past Medical History:    Abdominal aortic atherosclerosis (HCC)     With mild dilation. Seen on outside [facility] 2014 and 2017    Ascending aortic aneurysm (HCC)    Atrial fibrillation (HCC)     s/p abaltion 3/18    Atrial flutter (HCC)     s/p ablation    Bilateral renal cysts     Seen on outside [facility] 2014    Emphysema lung (HCC)     mild, central, hilar, seen on CT chest 2016 at outside facility, no oxygen use    Gallstone     Seen on outside [facility] 2014    High blood pressure    Hypertrophic cardiomyopathy (HCC)    Liver lesion, right lobe     Seen on outside [facility] 2014, felt to be hemngioma    Mitral valve annular calcification     Seen on ECHO    Pars defect of lumbar spine, B L5     Seen incidentally on CT urogram 2/2019    Pulmonary nodule     Followed by an outside MD            Past Surgical History  Past Surgical History         Past Surgical History:   Procedure Laterality Date    Cataract         B    Cath ablation   03/2018     For A. Fib/flutter    Colonoscopy         Li report 3/2010--tics and  polyps    Colonoscopy N/A 1/25/2017     Procedure: COLONOSCOPY, POSSIBLE BIOPSY, POSSIBLE POLYPECTOMY 09833;  Surgeon: Lalito Benton DO;  Location: [facility] intracard cath ablation arrhythmia   01/2019     Dr. Denis Hurt, [facility]    Tonsillectomy                Family History  Family History   No family history on file.        Social History      Pediatric History   Patient Parents    Not on file           Other Topics Concern    Caffeine Concern Not Asked    Exercise Not Asked    Seat Belt Not Asked    Special Diet Not Asked    Stress Concern Not Asked    Weight Concern Not Asked   Social History Narrative    Not on file            Current Medications:  Current Hospital Medications          Current Facility-Administered Medications   Medication Dose Route Frequency    [START ON 11/1/2024] aspirin chewable tab 81 mg  81 mg Oral Daily    [START ON 11/1/2024] metoprolol succinate ER (Toprol XL) 24 hr tab 25 mg  25 mg Oral Daily Beta Blocker    [START ON 11/1/2024] rivaroxaban (Xarelto) tab 20 mg  20 mg Oral Daily with food    dilTIAZem (cardIZEM) 100 mg in sodium chloride 0.9% 100 mL IVPB-ADDV  2.5-20 mg/hr Intravenous Continuous    nicotine (Nicoderm CQ) 14 MG/24HR patch 1 patch  1 patch Transdermal Daily    sodium chloride 0.9% infusion   Intravenous Continuous    acetaminophen (Tylenol Extra Strength) tab 500 mg  500 mg Oral Q4H PRN    melatonin tab 3 mg  3 mg Oral Nightly PRN    polyethylene glycol (PEG 3350) (Miralax) 17 g oral packet 17 g  17 g Oral Daily PRN    sennosides (Senokot) tab 17.2 mg  17.2 mg Oral Nightly PRN    bisacodyl (Dulcolax) 10 MG rectal suppository 10 mg  10 mg Rectal Daily PRN    fleet enema (Fleet) rectal enema 133 mL  1 enema Rectal Once PRN    ondansetron (Zofran) 4 MG/2ML injection 4 mg  4 mg Intravenous Q6H PRN    metoclopramide (Reglan) 5 mg/mL injection 5 mg  5 mg Intravenous Q8H PRN    benzonatate (Tessalon) cap 200 mg  200 mg Oral TID PRN    guaiFENesin  (Robitussin) 100 MG/5 ML oral liquid 200 mg  200 mg Oral Q4H PRN    glycerin-hypromellose- (Artificial Tears) 0.2-0.2-1 % ophthalmic solution 1 drop  1 drop Both Eyes QID PRN    sodium chloride (Saline Mist) 0.65 % nasal solution 1 spray  1 spray Each Nare Q3H PRN         Prescriptions Prior to Admission        Medications Prior to Admission   Medication Sig    XARELTO 20 MG Oral Tab Take 1 tablet (20 mg total) by mouth daily with food.    Multiple Vitamin (MULTIVITAMIN ADULT OR) Take by mouth.    aspirin 81 MG Oral Chew Tab Chew 1 tablet (81 mg total) by mouth daily. 1 qd    Metoprolol Succinate ER 25 MG Oral Tablet 24 Hr Take 1 tablet (25 mg total) by mouth daily.            Allergies  [Allergies]    [Allergies]        Allergen Reactions    Demerol Hcl [Meperidine] HIVES    Penicillins UNKNOWN and OTHER (SEE COMMENTS)       Other reaction(s): Other - See Comments  Unknown           Review of Systems:   10 pt ROS performed, separate from HPI  Review of Systems:  GENERAL: no fevers, chills, sweats  HEENT: no visual or hearing changes  SKIN: denies any unusual skin lesions or rashes  RESPIRATORY: denies shortness of breath with exertion  CARDIOVASCULAR: no active chest pain, no claudication  GI: denies abdominal pain and denies heartburn  : no dysuria or hematuria  NEURO: denies headaches, focal weaknesses or paresthesias  All other systems were reviewed are negative  Physical Exam:   Blood pressure 97/70, pulse (!) 129, temperature 98.6 °F (37 °C), temperature source Oral, resp. rate 17, height 70\", weight 159 lb 9.8 oz (72.4 kg), SpO2 95%.     Scheduled Meds:   Scheduled Medications    [START ON 11/1/2024] aspirin  81 mg Oral Daily    [START ON 11/1/2024] metoprolol succinate ER  25 mg Oral Daily Beta Blocker    [START ON 11/1/2024] rivaroxaban  20 mg Oral Daily with food    nicotine  1 patch Transdermal Daily               Physical Exam:     General: Alert and oriented. No apparent distress. No  respiratory or constitutional distress.  HEENT: Normocephalic, anicteric sclera, neck supple  Neck: No JVD, carotids 2+, supple  Cardiac: Regular rate. No pathologic murmur.  Lungs: Clear with normal effort.  Normal excursions and effort.  Abdomen: Soft, non-tender. BS-present.  Extremities: Without clubbing, cyanosis.  Peripheral pulsespresent.  Neurologic: Alert and oriented, normal affect. Motor ok.  Skin: Warm and dry.      Imaging: I independently visualized all relevant chest imaging in PACS, agree with radiology interpretation except where noted.  Thank you for allowing me to participate in the care of your patient.     Labs:  HEM:       Recent Labs   Lab 10/28/24  1920 10/31/24  0637   WBC 10.9 13.5*   HGB 13.8 16.1   .0 291.0         Chem:       Recent Labs   Lab 10/28/24  1920 10/31/24  0638   * 136   K 3.9 3.9    105   CO2 22.0 22.0   BUN 19 13   CREATSERUM 1.17 1.49*   CA 9.4 9.2   GLU 97 209*              Recent Labs   Lab 10/28/24  1920 10/31/24  0638   ALT 23 30   AST 21 21   ALB 3.7 3.5         No results for input(s): \"TROP\", \"CK\" in the last 168 hours.         Recent Labs   Lab 10/31/24  0637   PTP 15.1*   INR 1.21*         Impression:   1.  Atrial flutter with RVR-when EKG showed atrial flutter no one-to-one pattern with heart rates in the 200s, now EKG shows atrial flutter and a 2-1 pattern.  Start Xarelto and diltiazem drip.  Will see if patient converts on his own if continues to be in an RVR will discuss with EP regarding utility of cardioversion versus starting antiarrhythmic tomorrow.  Continue metoprolol as well.  2.  Elevated troponin-very low level, suspect in the setting of RVR and demand.  Will continue to trend to peak.  May need ischemic evaluation as an outpatient.     Recommendations:  Start Xarelto  Diltiazem drip  Continue metoprolol  If does not convert into normal rhythm EP to see and discuss antiarrhythmics and ablation.  Check echo  Check TSH     C 5      Mendez Gonzalez MD  [facility]  10/31/2024         Electronically signed by Mendez Gonzalez MD at 10/31/2024  3:49 PM         ED to Hosp-Admission (Discharged) on 10/31/2024            Revision History            Detailed Report          Note shared with patient  Chart Review: Note Routing History    No rou   Admission Reconciliation is Not Complete  Discharge Reconciliation is Not Complete Admission Reconciliation is Completed  Discharge Reconciliation is Completed

## 2024-11-15 NOTE — PAT NURSING NOTE
PreOp Instructions     You are scheduled for: a Cardiac Procedure     Date of Procedure: 11/19/24     Diet Instructions: Do not eat or drink anything after midnight including gum, mints, candy, etc.     Medications: Medications you are allowed to take can be taken with a sip of water the morning of your procedure, Take Aspirin 81 mg x 4 tablets the day of your procedure     Do not take the following Blood Thinner(s): last dose of xarleto on 11/16/ Saturday     Medications to Stop: Hold herbal supplements and vitamins     Skin Prep : Shower with antibacterial soap using a clean washcloth, prior to procedure. Once dried off, no lotions/powders/creams/ointments, etc.     Arrival Time: The day prior to your procedure you will receive a phone call before 6:00 pm with your arrival time. If you haven't received a phone call, please check your voicemail messages., If you did not receive a voice mail and it is after 6:00 pm, please call the nursing supervisor at [phone].    Driving After Procedure: Sedation will be given so you WILL NOT be able to drive home. You will need a responsible adult  to drive you home. You can NOT take uber or taxi unless approved by your physician in advance.     Discharge Teaching: Your nurse will give you specific instructions before discharge, Most people can resume normal activities in 2-3 days, Any questions, please call the physician's office

## 2024-11-19 ENCOUNTER — APPOINTMENT (OUTPATIENT)
Dept: CARDIOLOGY | Age: 79
End: 2024-11-19
Attending: INTERNAL MEDICINE

## 2024-11-19 ENCOUNTER — HOSPITAL ENCOUNTER (OUTPATIENT)
Dept: INTERVENTIONAL RADIOLOGY/VASCULAR | Facility: HOSPITAL | Age: 79
Discharge: HOME | End: 2024-11-19
Attending: INTERNAL MEDICINE | Admitting: INTERNAL MEDICINE
Payer: MEDICARE

## 2024-11-19 VITALS
SYSTOLIC BLOOD PRESSURE: 97 MMHG | DIASTOLIC BLOOD PRESSURE: 62 MMHG | HEART RATE: 47 BPM | TEMPERATURE: 98 F | OXYGEN SATURATION: 94 % | RESPIRATION RATE: 21 BRPM

## 2024-11-19 DIAGNOSIS — I21.9 MYOCARDIAL INFARCT (HCC): ICD-10-CM

## 2024-11-19 DIAGNOSIS — I48.92 ATRIAL FLUTTER (HCC): ICD-10-CM

## 2024-11-19 LAB — ISTAT ACTIVATED CLOTTING TIME: 434 SECONDS (ref 74–137)

## 2024-11-19 PROCEDURE — 85347 COAGULATION TIME ACTIVATED: CPT

## 2024-11-19 PROCEDURE — 93005 ELECTROCARDIOGRAM TRACING: CPT

## 2024-11-19 PROCEDURE — 93010 ELECTROCARDIOGRAM REPORT: CPT | Performed by: INTERNAL MEDICINE

## 2024-11-19 PROCEDURE — 99153 MOD SED SAME PHYS/QHP EA: CPT | Performed by: INTERNAL MEDICINE

## 2024-11-19 PROCEDURE — 99211 OFF/OP EST MAY X REQ PHY/QHP: CPT

## 2024-11-19 PROCEDURE — 99152 MOD SED SAME PHYS/QHP 5/>YRS: CPT | Performed by: INTERNAL MEDICINE

## 2024-11-19 PROCEDURE — 4A023N7 MEASUREMENT OF CARDIAC SAMPLING AND PRESSURE, LEFT HEART, PERCUTANEOUS APPROACH: ICD-10-PCS | Performed by: INTERNAL MEDICINE

## 2024-11-19 PROCEDURE — 027034Z DILATION OF CORONARY ARTERY, ONE ARTERY WITH DRUG-ELUTING INTRALUMINAL DEVICE, PERCUTANEOUS APPROACH: ICD-10-PCS | Performed by: INTERNAL MEDICINE

## 2024-11-19 PROCEDURE — B240ZZ3 ULTRASONOGRAPHY OF SINGLE CORONARY ARTERY, INTRAVASCULAR: ICD-10-PCS | Performed by: INTERNAL MEDICINE

## 2024-11-19 PROCEDURE — 92978 ENDOLUMINL IVUS OCT C 1ST: CPT | Performed by: INTERNAL MEDICINE

## 2024-11-19 PROCEDURE — 93458 L HRT ARTERY/VENTRICLE ANGIO: CPT | Performed by: INTERNAL MEDICINE

## 2024-11-19 PROCEDURE — B2111ZZ FLUOROSCOPY OF MULTIPLE CORONARY ARTERIES USING LOW OSMOLAR CONTRAST: ICD-10-PCS | Performed by: INTERNAL MEDICINE

## 2024-11-19 RX ORDER — SODIUM CHLORIDE 9 MG/ML
INJECTION, SOLUTION INTRAVENOUS CONTINUOUS
Status: DISCONTINUED | OUTPATIENT
Start: 2024-11-19 | End: 2024-11-19

## 2024-11-19 RX ORDER — HEPARIN SODIUM 5000 [USP'U]/ML
INJECTION, SOLUTION INTRAVENOUS; SUBCUTANEOUS
Status: COMPLETED
Start: 2024-11-19 | End: 2024-11-19

## 2024-11-19 RX ORDER — CLOPIDOGREL BISULFATE 75 MG/1
TABLET ORAL
Status: COMPLETED
Start: 2024-11-19 | End: 2024-11-19

## 2024-11-19 RX ORDER — ASPIRIN 81 MG/1
81 TABLET ORAL DAILY
Status: DISCONTINUED | OUTPATIENT
Start: 2024-11-20 | End: 2024-11-19

## 2024-11-19 RX ORDER — CLOPIDOGREL BISULFATE 75 MG/1
75 TABLET ORAL DAILY
Status: DISCONTINUED | OUTPATIENT
Start: 2024-11-20 | End: 2024-11-19

## 2024-11-19 RX ORDER — VERAPAMIL HYDROCHLORIDE 2.5 MG/ML
INJECTION, SOLUTION INTRAVENOUS
Status: COMPLETED
Start: 2024-11-19 | End: 2024-11-19

## 2024-11-19 RX ORDER — CLOPIDOGREL BISULFATE 75 MG/1
75 TABLET ORAL DAILY
Qty: 30 TABLET | Refills: 5 | Status: SHIPPED | OUTPATIENT
Start: 2024-11-19

## 2024-11-19 RX ORDER — SODIUM CHLORIDE 9 MG/ML
INJECTION, SOLUTION INTRAVENOUS
Status: DISCONTINUED | OUTPATIENT
Start: 2024-11-20 | End: 2024-11-19 | Stop reason: HOSPADM

## 2024-11-19 RX ORDER — LIDOCAINE HYDROCHLORIDE 10 MG/ML
INJECTION, SOLUTION EPIDURAL; INFILTRATION; INTRACAUDAL; PERINEURAL
Status: COMPLETED
Start: 2024-11-19 | End: 2024-11-19

## 2024-11-19 RX ORDER — MIDAZOLAM HYDROCHLORIDE 1 MG/ML
INJECTION INTRAMUSCULAR; INTRAVENOUS
Status: COMPLETED
Start: 2024-11-19 | End: 2024-11-19

## 2024-11-19 RX ORDER — NITROGLYCERIN 20 MG/100ML
INJECTION INTRAVENOUS
Status: COMPLETED
Start: 2024-11-19 | End: 2024-11-19

## 2024-11-19 NOTE — DISCHARGE INSTRUCTIONS
Resume Eliquis on Thursday November 21    Take 81 mg of aspirin daily for 2 weeks    Continue taking 75 mg of Plavix daily with Eliquis        HOME CARE INSTRUCTIONS FOLLOWING CORONARY ANGIOGRAPHY, ANGIOPLASTY (PTCA/PTA) OR INSERTION OF STENT IN THE CORONARY      Activity  DO NOT drive after the procedure.  You may resume driving late the following day according to the nurse or physician's instructions  Plan on resting and relaxing tonight and tomorrow  Resume your normal activity after 48 hours, or as instructed by your physician  Do not lift anything over 10 pounds for 48 hours  Avoid heavy lifting for 1 week  Avoid drinking alcohol for the next 24 hours    What is Normal?  A small lump at the procedure site associated with mild tenderness when touched  The procedure site may be bruised or discolored  There may be a small amount of drainage on the bandage    Special Instructions  Drink plenty of fluids during the next 24 hours to \"flush\" the contrast from your system  The bandage is to remain in place for 24 hours  Keep the bandage clean and dry  DO NOT submerge the procedure site for 3 days (no bath tubs or pools)  After 24 hours, you must remove the bandage  You should shower after removing the bandage, and wash the procedure site gently with soap and water  If you choose to wear a bandage for a few days, make sure it remains clean and dry and that it is changed daily    When you should NOTIFY YOUR PHYSICIAN  Bleeding can occur at the procedure site - both on the outside of the skin and/or beneath the surface of the skin  Swelling or a large lump at the procedure site can occur, which may be accompanied by moderate to severe pain    If either of the above occurs, lie down flat.  Have someone apply pressure to the procedure site with both hands, as instructed by the nurse.  Hold pressure for 20 minutes and the bleeding should stop.  Notify your physician of the occurrence  If the bleeding does not stop, call  911 and continue to apply pressure    If you experience signs of a fever, temperature > 101°, chills, infection (redness, swelling, thick yellow drainage, or a foul odor from the procedure site)  If you notice any numbness, tingling, or loss of feeling to your fingers or hand, if wrist access was utilized    If You Received a Stent:    You will remain on an antiplatelet drug and/or aspirin.  Antiplatelet medications are usually taken for six months to one year and should not be stopped unless your cardiologist directs you to do so.  These medications help to prevent blockage at the stent site.  If another physician or dentist asks you to stop your antiplatelet medication, you need to consult your cardiologist first.  Together, your cardiologist and other physician can discuss the risks that may be involved if you are not taking the antiplatelet medication   If an MRI is necessary, it may be done 4-6 weeks after your procedure.  Verify this with your cardiologist  Keep your stent card with you at all times!  If you need an MRI in the future, your stent card will need to be shown to the technologist before performing the MRI.  A duplicate card CANNOT be reproduced.    Other  You may resume your present diet, unless otherwise specified by your physician.  You may resume all of your medications as prescribed, unless otherwise directed by your physician.  A list of your medications was provided to you at discharge.  Continue the walking program initiated in the hospital and progress your walking as directed.  Or, gradually resume your previous aerobic exercise schedule as tolerated.  Hold metformin/glucophage 48 hours post proceudre.

## 2024-11-19 NOTE — OPERATIVE REPORT
[facility]   part of [facility]    Cardiac Catheterization Note    Primary Proceduralist: Mendez Gonzalez MD  Procedure Performed: LHC, COR, and Ivus guided PCI of OM2  Date of Procedure: 11/19/2024   Indication: NSTEMI  Pre Operative Diagnosis: NSTEMI  Post Operative Diagnosis: 1v CAD  Estimated blood loss: <5  Specimens: None    Consent:   Informed written consent was obtained from the patient after risk benefits and alternative explained the patient.  Patient agreed to proceed    Sedation  IV conscious sedation was achieved with Versed and fentanyl.  It was administered under my direct supervision.  Hemodynamic monitoring took place throughout the entire procedure by myself in the Cath Lab staff.  4 mg of Versed and 75 mcg of Fentanyl were given.  Start Time: 1310 end Time: 1403      Description of the procedure: After written informed consent was obtained from the patient, patient was brought to the cardiac catheterization laboratory in a stable condition and fasting state.  Patient was prepped and draped in the usual sterile fashion.  IV conscious sedation was achieved with Versed and fentanyl.  Lidocaine 1% was used to infiltrate the right radial artery for local anesthesia and a 6 Bengali introducer sheath was inserted into the right radial artery. TIG catheter was used to selectively engage the left main to perform coronary angiography, his catheter was unable to selectively engage the right coronary artery therefore a JR4 catheter was used.  The JR4 catheter was also advanced in the LV to perform uretic assessment.  Patient had one-vessel coronary artery disease involving a large OM branch for which PCI was performed, likely the culprit of his elevated troponin during his last admission.  See results below.      Coronary Angiogram Findings:  LM: Large caliber artery giving rise to LAD & LCX.  10% ostial  LAD: Large caliber artery giving rise to diagonal and septal branches.  10%  proximal disease  LCX: large caliber artery giving rise to OM branches.  OM1 is a small size vessel, no significant disease, OM 2 was a large vessel with an ostial 90% lesion, the mid left circumflex had a 40% lesion after the bifurcation of the large OM 2 branch terminated into 2 smaller distal OM  RCA: Large caliber artery giving rise to acute marginal branches, and bifurcates into RPDA & RPL.  30% proximal RCA lesion    Hemodynamics:  LVEDP 8 mmHg  No significant gradient upon Ao-LV pullback    Intervention:  Patient 1: OM 2-90% lesion with CANDACE-3 flow to start  Guide catheter: EBU 3.75 6 [place]  Guidewire: Run-through to OM 2, the left circumflex was wired with a Wilman blue to protect the vessel in case of plaque shift  IVUS was used, 6 [place], confirmed soft plaque distal diameter was 3.5, proximal was a 4.0  Predilated with a 3.0 x 15 semicompliant balloon  Stent: 3.5 x 20 mm Reachable drug-eluting stent  Postdilated with 3.5 x 15 mm NC, and a 4.0 x 6 mm NC in the proximal segment  Final IVUS showed good stent expansion, no distal or proximal edge dissection, patent native left circumflex  Final angiographic result was excellent, stenosis percentage from 90-0, CANDACE-3 flow at the end of the case, left circumflex with mild plaque shift however CANDACE-3 flow down the moderate-sized vessel.    Monitored sedation administered by the cath lab RN, and supervised throughout the procedure by myself. Total time 53 minutes.     Closure: TR band    No immediate complications.  None    A/P:  One-vessel coronary disease involving left circumflex status post drug-eluting stent  Restart Xarelto on Thursday morning  Loaded with Plavix in the lab, continue 75 mg of Plavix for at least 1 year  Aspirin 81 mg daily for 2 more weeks then stop  Continue statin  Continue metoprolol and amiodarone, will follow-up with EP regarding prior episode atrial flutter, today is in sinus    Mendez Gonzalez MD  Interventional  Cardiology  [facility]

## 2024-11-19 NOTE — CARDIAC REHAB
CAD education complete with patient and his wife.  Stent card given to patient.  Patient lives close to [facility] in [place].  Information given to patient to call for orientation as they wish to go closer to home. All questions addressed.

## 2024-11-19 NOTE — DIETARY NOTE
Clinical Nutrition    Dietitian consult received per cardiac rehab standing order. Pt to be educated by cardiac rehab staff and encouraged to attend outpatient classes taught by RD. RD available PRN.    Temi Shaver MS, RD, LDN  Clinical Dietitian  Ext: [phone]

## 2024-11-19 NOTE — H&P
Enoch Walker is a 78 year old male with Aflutter, NSTEMI referred for angiogram with possible intervention indicated for NSTEMI. H&P reviewed and there are no significant changes. Indications, risks, benefits and alternate options reviewed with the patient and they are agreeable to proceed with the procedure.    Mendez Gonzalez MD  Interventional Cardiology

## 2024-11-20 PROBLEM — I21.9 MYOCARDIAL INFARCTION (HCC): Status: ACTIVE | Noted: 2024-11-13

## 2024-11-20 LAB
ATRIAL RATE: 46 BPM
P AXIS: 73 DEGREES
P-R INTERVAL: 180 MS
Q-T INTERVAL: 546 MS
QRS DURATION: 102 MS
QTC CALCULATION (BEZET): 477 MS
R AXIS: 33 DEGREES
T AXIS: 37 DEGREES
VENTRICULAR RATE: 46 BPM

## 2025-02-24 NOTE — IMAGING NOTE
Call placed to pt at this time.  Pt advised to arrive at 0915. Pt may eat a light meal prior to scan and to drink plenty of extra water the day before.   Pt advised to refrain from coffees and teas including those labeled as decaf, pop, and chocolate for 12 hours prior to scan as well as smoking en route to hospital.  Pt advised to take regular medications as usual.

## 2025-02-25 RX ORDER — AMIODARONE HYDROCHLORIDE 100 MG/1
100 TABLET ORAL DAILY
COMMUNITY

## 2025-02-25 NOTE — PAT NURSING NOTE
PAT call with th patients wife, Donna. The following instructions were given and sent through his My Chart. Questions answered and she verbalized understanding.    PreOp Instructions     Reminder: You have an appointment 2/26/25 starting at 9:30 am for your CTA, LAB work, and an EKG     You are scheduled for: a Cardiac Procedure     Date of Procedure: 03/05/25     Diet Instructions: Do not eat or drink anything after midnight including gum, mints, candy, etc.     Medications: Take Plavix 75mg the day of your procedure, Medications you are allowed to take can be taken with a sip of water the morning of your procedure     Do not take the following Blood Thinner(s): Last dose of Xarelto is to be 3/4/25     Medications to Stop: Hold herbal supplements and vitamins     Skin Prep : Shower with antibacterial soap using a clean washcloth, prior to procedure. Once dried off, no lotions/powders/creams/ointments, etc., Do not shave the procedure area, this will be completed at the hospital during the preparation phase.     Arrival Time: The day prior to your procedure you will receive a phone call before 6:00 pm with your arrival time. If you haven't received a phone call, please check your voicemail messages., If you did not receive a voice mail and it is after 6:00 pm, please call the nursing supervisor at [phone].    Driving After Procedure: Sedation will be given so you WILL NOT be able to drive home. You will need a responsible adult  to drive you home. You can NOT take uber or taxi unless approved by your physician in advance.     Discharge Teaching: Your nurse will give you specific instructions before discharge, Any questions, please call the physician's office        [place] parking is available starting at 6 am or park in the [place] parking garage at [facility]. Check in at the [facility] reception desk. Our  will be there to check you in for your procedure. Please bring  your insurance cards and ID with you.

## 2025-02-26 ENCOUNTER — LAB ENCOUNTER (OUTPATIENT)
Dept: LAB | Facility: HOSPITAL | Age: 80
End: 2025-02-26
Attending: INTERNAL MEDICINE
Payer: MEDICARE

## 2025-02-26 ENCOUNTER — HOSPITAL ENCOUNTER (OUTPATIENT)
Dept: CT IMAGING | Facility: HOSPITAL | Age: 80
Discharge: HOME | End: 2025-02-26
Attending: INTERNAL MEDICINE
Payer: MEDICARE

## 2025-02-26 VITALS — HEART RATE: 49 BPM | SYSTOLIC BLOOD PRESSURE: 96 MMHG | DIASTOLIC BLOOD PRESSURE: 56 MMHG

## 2025-02-26 DIAGNOSIS — Z01.818 PRE-OP TESTING: ICD-10-CM

## 2025-02-26 DIAGNOSIS — I48.0 PAF (PAROXYSMAL ATRIAL FIBRILLATION) (HCC): ICD-10-CM

## 2025-02-26 LAB
ANION GAP SERPL CALC-SCNC: 9 MMOL/L (ref 0–18)
ATRIAL RATE: 57 BPM
BASOPHILS # BLD AUTO: 0.09 X10(3) UL (ref 0–0.2)
BASOPHILS NFR BLD AUTO: 1.2 %
BUN BLD-MCNC: 15 MG/DL (ref 9–23)
CALCIUM BLD-MCNC: 9.5 MG/DL (ref 8.7–10.6)
CHLORIDE SERPL-SCNC: 106 MMOL/L (ref 98–112)
CO2 SERPL-SCNC: 25 MMOL/L (ref 21–32)
CREAT BLD-MCNC: 1.48 MG/DL
CREAT BLD-MCNC: 1.5 MG/DL
EGFRCR SERPLBLD CKD-EPI 2021: 48 ML/MIN/1.73M2 (ref 60–?)
EGFRCR SERPLBLD CKD-EPI 2021: 48 ML/MIN/1.73M2 (ref 60–?)
EOSINOPHIL # BLD AUTO: 0.23 X10(3) UL (ref 0–0.7)
EOSINOPHIL NFR BLD AUTO: 3.1 %
ERYTHROCYTE [DISTWIDTH] IN BLOOD BY AUTOMATED COUNT: 15.1 %
FASTING STATUS PATIENT QL REPORTED: YES
GLUCOSE BLD-MCNC: 118 MG/DL (ref 70–99)
HCT VFR BLD AUTO: 40.2 %
HGB BLD-MCNC: 13.4 G/DL
IMM GRANULOCYTES # BLD AUTO: 0.02 X10(3) UL (ref 0–1)
IMM GRANULOCYTES NFR BLD: 0.3 %
LYMPHOCYTES # BLD AUTO: 1.58 X10(3) UL (ref 1–4)
LYMPHOCYTES NFR BLD AUTO: 21 %
MCH RBC QN AUTO: 32.6 PG (ref 26–34)
MCHC RBC AUTO-ENTMCNC: 33.3 G/DL (ref 31–37)
MCV RBC AUTO: 97.8 FL
MONOCYTES # BLD AUTO: 0.62 X10(3) UL (ref 0.1–1)
MONOCYTES NFR BLD AUTO: 8.2 %
NEUTROPHILS # BLD AUTO: 5 X10 (3) UL (ref 1.5–7.7)
NEUTROPHILS # BLD AUTO: 5 X10(3) UL (ref 1.5–7.7)
NEUTROPHILS NFR BLD AUTO: 66.2 %
OSMOLALITY SERPL CALC.SUM OF ELEC: 292 MOSM/KG (ref 275–295)
P AXIS: 90 DEGREES
P-R INTERVAL: 152 MS
PLATELET # BLD AUTO: 237 10(3)UL (ref 150–450)
POTASSIUM SERPL-SCNC: 4.2 MMOL/L (ref 3.5–5.1)
Q-T INTERVAL: 500 MS
QRS DURATION: 116 MS
QTC CALCULATION (BEZET): 486 MS
R AXIS: 46 DEGREES
RBC # BLD AUTO: 4.11 X10(6)UL
SODIUM SERPL-SCNC: 140 MMOL/L (ref 136–145)
T AXIS: 61 DEGREES
VENTRICULAR RATE: 57 BPM
WBC # BLD AUTO: 7.5 X10(3) UL (ref 4–11)

## 2025-02-26 PROCEDURE — 93010 ELECTROCARDIOGRAM REPORT: CPT | Performed by: INTERNAL MEDICINE

## 2025-02-26 PROCEDURE — 93005 ELECTROCARDIOGRAM TRACING: CPT

## 2025-02-26 PROCEDURE — 75572 CT HRT W/3D IMAGE: CPT | Performed by: INTERNAL MEDICINE

## 2025-02-26 PROCEDURE — 85025 COMPLETE CBC W/AUTO DIFF WBC: CPT

## 2025-02-26 PROCEDURE — 80048 BASIC METABOLIC PNL TOTAL CA: CPT

## 2025-02-26 PROCEDURE — 36415 COLL VENOUS BLD VENIPUNCTURE: CPT

## 2025-02-26 PROCEDURE — 82565 ASSAY OF CREATININE: CPT

## 2025-02-26 RX ORDER — METOPROLOL TARTRATE 1 MG/ML
5 INJECTION, SOLUTION INTRAVENOUS SEE ADMIN INSTRUCTIONS
Status: DISCONTINUED | OUTPATIENT
Start: 2025-02-26 | End: 2025-02-28

## 2025-02-26 RX ORDER — DILTIAZEM HYDROCHLORIDE 5 MG/ML
5 INJECTION INTRAVENOUS SEE ADMIN INSTRUCTIONS
Status: DISCONTINUED | OUTPATIENT
Start: 2025-02-26 | End: 2025-02-28

## 2025-02-26 NOTE — IMAGING NOTE
Patient to CT  4, GE. IV placed by Krista, CT tech. Reviewed patient's name,  and allergies. Procedure explained and questions answered. GFR = 48.    Contrast injected followed by saline flush at 1026.  Contrast injection = 80ml  0.9 NS flush = 100ml  Average HR = 49    Patient tolerated the procedure without complication. Denies any contrast reaction. Discontinued IV saline lock. Escorted pt to [facility]'s Dressing Room and discharged in stable condition.

## 2025-03-04 ENCOUNTER — ANESTHESIA EVENT (OUTPATIENT)
Dept: INTERVENTIONAL RADIOLOGY/VASCULAR | Facility: HOSPITAL | Age: 80
End: 2025-03-04
Payer: MEDICARE

## 2025-03-05 ENCOUNTER — ANESTHESIA (OUTPATIENT)
Dept: INTERVENTIONAL RADIOLOGY/VASCULAR | Facility: HOSPITAL | Age: 80
End: 2025-03-05
Payer: MEDICARE

## 2025-03-05 ENCOUNTER — HOSPITAL ENCOUNTER (OUTPATIENT)
Dept: INTERVENTIONAL RADIOLOGY/VASCULAR | Facility: HOSPITAL | Age: 80
Discharge: HOME | End: 2025-03-05
Attending: INTERNAL MEDICINE | Admitting: INTERNAL MEDICINE
Payer: MEDICARE

## 2025-03-05 VITALS
OXYGEN SATURATION: 96 % | TEMPERATURE: 97 F | BODY MASS INDEX: 24.44 KG/M2 | SYSTOLIC BLOOD PRESSURE: 100 MMHG | WEIGHT: 165 LBS | HEIGHT: 69 IN | RESPIRATION RATE: 10 BRPM | HEART RATE: 57 BPM | DIASTOLIC BLOOD PRESSURE: 64 MMHG

## 2025-03-05 DIAGNOSIS — Z01.818 PRE-OP TESTING: ICD-10-CM

## 2025-03-05 DIAGNOSIS — I48.91 A-FIB (HCC): ICD-10-CM

## 2025-03-05 DIAGNOSIS — I48.0 PAF (PAROXYSMAL ATRIAL FIBRILLATION) (HCC): Primary | ICD-10-CM

## 2025-03-05 LAB
ATRIAL RATE: 51 BPM
ISTAT ACTIVATED CLOTTING TIME: 314 SECONDS (ref 74–137)
ISTAT ACTIVATED CLOTTING TIME: 331 SECONDS (ref 74–137)
ISTAT ACTIVATED CLOTTING TIME: 371 SECONDS (ref 74–137)
ISTAT ACTIVATED CLOTTING TIME: 371 SECONDS (ref 74–137)
P AXIS: 91 DEGREES
P-R INTERVAL: 158 MS
Q-T INTERVAL: 586 MS
QRS DURATION: 118 MS
QTC CALCULATION (BEZET): 540 MS
R AXIS: 52 DEGREES
T AXIS: 69 DEGREES
VENTRICULAR RATE: 51 BPM

## 2025-03-05 PROCEDURE — 4A0234Z MEASUREMENT OF CARDIAC ELECTRICAL ACTIVITY, PERCUTANEOUS APPROACH: ICD-10-PCS | Performed by: INTERNAL MEDICINE

## 2025-03-05 PROCEDURE — 93656 COMPRE EP EVAL ABLTJ ATR FIB: CPT | Performed by: INTERNAL MEDICINE

## 2025-03-05 PROCEDURE — 02K83ZZ MAP CONDUCTION MECHANISM, PERCUTANEOUS APPROACH: ICD-10-PCS | Performed by: INTERNAL MEDICINE

## 2025-03-05 PROCEDURE — 02583ZZ DESTRUCTION OF CONDUCTION MECHANISM, PERCUTANEOUS APPROACH: ICD-10-PCS | Performed by: INTERNAL MEDICINE

## 2025-03-05 PROCEDURE — 93005 ELECTROCARDIOGRAM TRACING: CPT

## 2025-03-05 PROCEDURE — 4A023FZ MEASUREMENT OF CARDIAC RHYTHM, PERCUTANEOUS APPROACH: ICD-10-PCS | Performed by: INTERNAL MEDICINE

## 2025-03-05 PROCEDURE — 93655 ICAR CATH ABLTJ DSCRT ARRHYT: CPT | Performed by: INTERNAL MEDICINE

## 2025-03-05 PROCEDURE — 93010 ELECTROCARDIOGRAM REPORT: CPT | Performed by: INTERNAL MEDICINE

## 2025-03-05 PROCEDURE — 85347 COAGULATION TIME ACTIVATED: CPT

## 2025-03-05 PROCEDURE — 93657 TX L/R ATRIAL FIB ADDL: CPT | Performed by: INTERNAL MEDICINE

## 2025-03-05 RX ORDER — DEXAMETHASONE SODIUM PHOSPHATE 4 MG/ML
VIAL (ML) INJECTION AS NEEDED
Status: DISCONTINUED | OUTPATIENT
Start: 2025-03-05 | End: 2025-03-05 | Stop reason: SURG

## 2025-03-05 RX ORDER — ACETAMINOPHEN 500 MG
1000 TABLET ORAL ONCE AS NEEDED
Status: DISCONTINUED | OUTPATIENT
Start: 2025-03-05 | End: 2025-03-05 | Stop reason: HOSPADM

## 2025-03-05 RX ORDER — CHLORHEXIDINE GLUCONATE 40 MG/ML
SOLUTION TOPICAL
Status: DISCONTINUED | OUTPATIENT
Start: 2025-03-06 | End: 2025-03-05 | Stop reason: HOSPADM

## 2025-03-05 RX ORDER — ONDANSETRON 2 MG/ML
INJECTION INTRAMUSCULAR; INTRAVENOUS AS NEEDED
Status: DISCONTINUED | OUTPATIENT
Start: 2025-03-05 | End: 2025-03-05 | Stop reason: SURG

## 2025-03-05 RX ORDER — HEPARIN SODIUM 5000 [USP'U]/ML
INJECTION, SOLUTION INTRAVENOUS; SUBCUTANEOUS
Status: COMPLETED
Start: 2025-03-05 | End: 2025-03-05

## 2025-03-05 RX ORDER — PANTOPRAZOLE SODIUM 40 MG/1
40 TABLET, DELAYED RELEASE ORAL DAILY
Qty: 30 TABLET | Refills: 0 | Status: SHIPPED | OUTPATIENT
Start: 2025-03-05

## 2025-03-05 RX ORDER — PROTAMINE SULFATE 10 MG/ML
INJECTION, SOLUTION INTRAVENOUS
Status: COMPLETED
Start: 2025-03-05 | End: 2025-03-05

## 2025-03-05 RX ORDER — ALCOHOL 0.98 ML/ML
INJECTION INTRASPINAL
Status: COMPLETED
Start: 2025-03-05 | End: 2025-03-05

## 2025-03-05 RX ORDER — HYDROCODONE BITARTRATE AND ACETAMINOPHEN 5; 325 MG/1; MG/1
2 TABLET ORAL ONCE AS NEEDED
Status: DISCONTINUED | OUTPATIENT
Start: 2025-03-05 | End: 2025-03-05 | Stop reason: HOSPADM

## 2025-03-05 RX ORDER — ROCURONIUM BROMIDE 10 MG/ML
INJECTION, SOLUTION INTRAVENOUS AS NEEDED
Status: DISCONTINUED | OUTPATIENT
Start: 2025-03-05 | End: 2025-03-05 | Stop reason: SURG

## 2025-03-05 RX ORDER — NALOXONE HYDROCHLORIDE 0.4 MG/ML
0.08 INJECTION, SOLUTION INTRAMUSCULAR; INTRAVENOUS; SUBCUTANEOUS AS NEEDED
Status: DISCONTINUED | OUTPATIENT
Start: 2025-03-05 | End: 2025-03-05 | Stop reason: HOSPADM

## 2025-03-05 RX ORDER — HYDROMORPHONE HYDROCHLORIDE 1 MG/ML
0.2 INJECTION, SOLUTION INTRAMUSCULAR; INTRAVENOUS; SUBCUTANEOUS EVERY 5 MIN PRN
Status: DISCONTINUED | OUTPATIENT
Start: 2025-03-05 | End: 2025-03-05 | Stop reason: HOSPADM

## 2025-03-05 RX ORDER — PROTAMINE SULFATE 10 MG/ML
INJECTION, SOLUTION INTRAVENOUS AS NEEDED
Status: DISCONTINUED | OUTPATIENT
Start: 2025-03-05 | End: 2025-03-05 | Stop reason: SURG

## 2025-03-05 RX ORDER — LIDOCAINE HYDROCHLORIDE AND EPINEPHRINE 10; 10 MG/ML; UG/ML
INJECTION, SOLUTION INFILTRATION; PERINEURAL
Status: COMPLETED
Start: 2025-03-05 | End: 2025-03-05

## 2025-03-05 RX ORDER — SODIUM CHLORIDE 9 MG/ML
INJECTION, SOLUTION INTRAVENOUS
Status: COMPLETED | OUTPATIENT
Start: 2025-03-06 | End: 2025-03-05

## 2025-03-05 RX ORDER — PHENYLEPHRINE HCL 10 MG/ML
VIAL (ML) INJECTION AS NEEDED
Status: DISCONTINUED | OUTPATIENT
Start: 2025-03-05 | End: 2025-03-05 | Stop reason: SURG

## 2025-03-05 RX ORDER — METOCLOPRAMIDE HYDROCHLORIDE 5 MG/ML
10 INJECTION INTRAMUSCULAR; INTRAVENOUS EVERY 8 HOURS PRN
Status: DISCONTINUED | OUTPATIENT
Start: 2025-03-05 | End: 2025-03-05 | Stop reason: HOSPADM

## 2025-03-05 RX ORDER — SODIUM CHLORIDE, SODIUM LACTATE, POTASSIUM CHLORIDE, CALCIUM CHLORIDE 600; 310; 30; 20 MG/100ML; MG/100ML; MG/100ML; MG/100ML
INJECTION, SOLUTION INTRAVENOUS CONTINUOUS
Status: DISCONTINUED | OUTPATIENT
Start: 2025-03-05 | End: 2025-03-05 | Stop reason: HOSPADM

## 2025-03-05 RX ORDER — HEPARIN SODIUM 1000 [USP'U]/ML
INJECTION, SOLUTION INTRAVENOUS; SUBCUTANEOUS AS NEEDED
Status: DISCONTINUED | OUTPATIENT
Start: 2025-03-05 | End: 2025-03-05 | Stop reason: SURG

## 2025-03-05 RX ORDER — HYDROMORPHONE HYDROCHLORIDE 1 MG/ML
0.4 INJECTION, SOLUTION INTRAMUSCULAR; INTRAVENOUS; SUBCUTANEOUS EVERY 5 MIN PRN
Status: DISCONTINUED | OUTPATIENT
Start: 2025-03-05 | End: 2025-03-05 | Stop reason: HOSPADM

## 2025-03-05 RX ORDER — LIDOCAINE HYDROCHLORIDE 10 MG/ML
INJECTION, SOLUTION EPIDURAL; INFILTRATION; INTRACAUDAL; PERINEURAL AS NEEDED
Status: DISCONTINUED | OUTPATIENT
Start: 2025-03-05 | End: 2025-03-05 | Stop reason: SURG

## 2025-03-05 RX ORDER — ESMOLOL HYDROCHLORIDE 10 MG/ML
INJECTION INTRAVENOUS AS NEEDED
Status: DISCONTINUED | OUTPATIENT
Start: 2025-03-05 | End: 2025-03-05 | Stop reason: SURG

## 2025-03-05 RX ORDER — ONDANSETRON 2 MG/ML
4 INJECTION INTRAMUSCULAR; INTRAVENOUS EVERY 6 HOURS PRN
Status: DISCONTINUED | OUTPATIENT
Start: 2025-03-05 | End: 2025-03-05 | Stop reason: HOSPADM

## 2025-03-05 RX ORDER — HYDROMORPHONE HYDROCHLORIDE 1 MG/ML
0.6 INJECTION, SOLUTION INTRAMUSCULAR; INTRAVENOUS; SUBCUTANEOUS EVERY 5 MIN PRN
Status: DISCONTINUED | OUTPATIENT
Start: 2025-03-05 | End: 2025-03-05 | Stop reason: HOSPADM

## 2025-03-05 RX ORDER — HYDROCODONE BITARTRATE AND ACETAMINOPHEN 5; 325 MG/1; MG/1
1 TABLET ORAL ONCE AS NEEDED
Status: DISCONTINUED | OUTPATIENT
Start: 2025-03-05 | End: 2025-03-05 | Stop reason: HOSPADM

## 2025-03-05 RX ORDER — HEPARIN SODIUM 1000 [USP'U]/ML
INJECTION, SOLUTION INTRAVENOUS; SUBCUTANEOUS
Status: COMPLETED
Start: 2025-03-05 | End: 2025-03-05

## 2025-03-05 RX ADMIN — PROTAMINE SULFATE 50 MG: 10 INJECTION, SOLUTION INTRAVENOUS at 10:21:00

## 2025-03-05 RX ADMIN — ESMOLOL HYDROCHLORIDE 80 MG: 10 INJECTION INTRAVENOUS at 07:21:00

## 2025-03-05 RX ADMIN — ROCURONIUM BROMIDE 30 MG: 10 INJECTION, SOLUTION INTRAVENOUS at 08:38:00

## 2025-03-05 RX ADMIN — PHENYLEPHRINE HCL 100 MCG: 10 MG/ML VIAL (ML) INJECTION at 10:26:00

## 2025-03-05 RX ADMIN — PHENYLEPHRINE HCL 100 MCG: 10 MG/ML VIAL (ML) INJECTION at 10:15:00

## 2025-03-05 RX ADMIN — PHENYLEPHRINE HCL 100 MCG: 10 MG/ML VIAL (ML) INJECTION at 07:21:00

## 2025-03-05 RX ADMIN — ROCURONIUM BROMIDE 100 MG: 10 INJECTION, SOLUTION INTRAVENOUS at 07:21:00

## 2025-03-05 RX ADMIN — HEPARIN SODIUM 15000 UNITS: 1000 INJECTION, SOLUTION INTRAVENOUS; SUBCUTANEOUS at 07:45:00

## 2025-03-05 RX ADMIN — HEPARIN SODIUM 3000 UNITS: 1000 INJECTION, SOLUTION INTRAVENOUS; SUBCUTANEOUS at 09:21:00

## 2025-03-05 RX ADMIN — PHENYLEPHRINE HCL 100 MCG: 10 MG/ML VIAL (ML) INJECTION at 08:22:00

## 2025-03-05 RX ADMIN — ROCURONIUM BROMIDE 20 MG: 10 INJECTION, SOLUTION INTRAVENOUS at 09:38:00

## 2025-03-05 RX ADMIN — PHENYLEPHRINE HCL 100 MCG: 10 MG/ML VIAL (ML) INJECTION at 10:21:00

## 2025-03-05 RX ADMIN — SODIUM CHLORIDE: 9 INJECTION, SOLUTION INTRAVENOUS at 07:20:00

## 2025-03-05 RX ADMIN — ONDANSETRON 4 MG: 2 INJECTION INTRAMUSCULAR; INTRAVENOUS at 10:21:00

## 2025-03-05 RX ADMIN — LIDOCAINE HYDROCHLORIDE 50 MG: 10 INJECTION, SOLUTION EPIDURAL; INFILTRATION; INTRACAUDAL; PERINEURAL at 07:21:00

## 2025-03-05 RX ADMIN — DEXAMETHASONE SODIUM PHOSPHATE 8 MG: 4 MG/ML VIAL (ML) INJECTION at 07:21:00

## 2025-03-05 RX ADMIN — HEPARIN SODIUM 3000 UNITS: 1000 INJECTION, SOLUTION INTRAVENOUS; SUBCUTANEOUS at 08:42:00

## 2025-03-05 RX ADMIN — PHENYLEPHRINE HCL 100 MCG: 10 MG/ML VIAL (ML) INJECTION at 07:35:00

## 2025-03-05 NOTE — H&P
[place]  Pre Procedure History and Physical    Enoch Walker Patient Status:  Outpatient    1945 MRN [id number]   Location [facility] INTERVENTIONAL SUITES Attending Amber Lynch MD   Hosp Day # 0 PCP Joseph Powell MD     Consults      History of Present Illness:  Enoch Walker is a a(n) 79 year old male here for RF PVI, RF CTI, and Redo PVI      Prior H/P Reviewed with No changes    Prior Cryo PVI/PWI/CTI/Anterior mitral line, SVC Isolation    History:  Past Medical History:    Abdominal aortic atherosclerosis    With mild dilation. Seen on outside U/S  and     Ascending aortic aneurysm    Atrial fibrillation (HCC)    s/p abaltion 3/18    Atrial flutter (HCC)    s/p ablation    Bilateral renal cysts    Seen on outside U/S     Emphysema lung (HCC)    mild, central, hilar, seen on CT chest 2016 at outside facility, no oxygen use    Gallstone    Seen on outside U/S     High blood pressure    Hypertrophic cardiomyopathy (HCC)    Liver lesion, right lobe    Seen on outside U/S , felt to be hemngioma    Mitral valve annular calcification    Seen on ECHO    Pars defect of lumbar spine, B L5    Seen incidentally on CT urogram 2019    Pulmonary nodule    Followed by an outside MD     Past Surgical History:   Procedure Laterality Date    Cataract      B    Cath ablation  2018    For A. Fib/flutter    Cath angio  2024    left circumflex status post drug-eluting stent    Colonoscopy      Li report 3/2010--tics and polyps    Colonoscopy N/A 2017    Procedure: COLONOSCOPY, POSSIBLE BIOPSY, POSSIBLE POLYPECTOMY 47071;  Surgeon: Lalito Benton DO;  Location: [facility] intracard cath ablation arrhythmia  2019    Dr. Denis Hurt, [facility]    Tonsillectomy       No family history on file.   reports that he has been smoking cigarettes. He has a 30 pack-year smoking history. He has never been exposed to tobacco smoke. He has never used  smokeless tobacco. He reports current alcohol use of about 3.0 standard drinks of alcohol per week. He reports that he does not use drugs.    Allergies:  Allergies[1]    Medications:    Current Facility-Administered Medications:     [START ON 3/6/2025] chlorhexidine (Hibiclens) 4 % external liquid, , Topical, On Call    OBJECTIVE      Physical Exam:  Physical Exam   Blood pressure 121/73, pulse 62, temperature 97.2 °F (36.2 °C), temperature source Temporal, resp. rate 14, height 5' 9\" (1.753 m), weight 165 lb (74.8 kg), SpO2 95%.  Temp (24hrs), Av.2 °F (36.2 °C), Min:97.2 °F (36.2 °C), Max:97.2 °F (36.2 °C)    Wt Readings from Last 3 Encounters:   25 165 lb (74.8 kg)   10/31/24 159 lb 9.8 oz (72.4 kg)   10/28/24 170 lb (77.1 kg)       NAD  PERRLA/EOMI  Neck veins not elevated  Carotids- no bruits  CTA bilaterally  Cardiac- RRR  Abdomen- Soft,Nontender, normal BS  Extremities- pulses normal  Edema- no edema  Mood /Affect Congruent  Skin- no lesions          Results:   Recent Labs   Lab 25  0919 25  1012   *  --    BUN 15  --    CREATSERUM 1.48*  --    EGFRCR 48* 48*   CA 9.5  --      --    K 4.2  --      --    CO2 25.0  --      Recent Labs   Lab 25  0919   RBC 4.11   HGB 13.4   HCT 40.2   MCV 97.8   MCH 32.6   MCHC 33.3   RDW 15.1   NEPRELIM 5.00   WBC 7.5   .0         [unfilled]  No results for input(s): \"BNP\" in the last 168 hours.  Lab Results   Component Value Date    INR 1.21 (H) 10/31/2024     No results found for: \"TROP\"      No results found.       Assessment and Plan    Patient Active Problem List   Diagnosis    Ascending aortic aneurysm    Hypertrophic cardiomyopathy (HCC)    PAF (paroxysmal atrial fibrillation) (HCC)    Abdominal aortic atherosclerosis    Emphysema lung (HCC)    Atrial fibrillation with rapid ventricular response (HCC)    Wide-complex tachycardia    Myocardial infarction (HCC)       Consent: Risks, Benefits and alternatives discussed in  clinic. Reverified on the day of the procedure    Procedure Planned: RF PVI, RF CTI, and Redo PVI    Sedation Plan: GETA/Attune    Discharge Plan: same day      Amber Lynch MD  Cardiac Electrophysiology  [facility] [place]  3/5/2025  7:33 AM         [1]   Allergies  Allergen Reactions    Demerol Hcl [Meperidine] HIVES    Penicillins OTHER (SEE COMMENTS) and UNKNOWN     Other reaction(s): Other - See Comments    Unknown

## 2025-03-05 NOTE — DISCHARGE INSTRUCTIONS
HOME CARE INSTRUCTIONS FOLLOWING CARDIAC ABLATION (RFA) OR ELECTROPHYSIOLOGIC STUDY (EPS)    Activity:  - DO NOT drive after the procedure. You may resume driving late the following day according to the nurse or physician's instructions.  - Plan on resting and relaxing tonight and tomorrow. It will be normal to tire easily for the first few days, depending on the length of the procedure and the amount of sedation you received.   - DO NOT lift anything over 10 pounds for the next 24 hours.  - Avoid sexual activity for the next 24 hours.  - Avoid repeated stair use and excessive walking for the next 24 hours.   - Avoid drinking alcohol for the next 24 hours.  - Resume your normal activity after 48 hours, or as instructed by your physician.    What is Normal:  - You may feel extra heart beats. If these beats come too often or you feel an episode of multiple fast heartbeats, notify your physician.  - The procedure site may appear bruised or discolored.  - There may be a small amount of drainage on the bandage  - There may be mild tenderness to the procedure site when touched, which is common.     Special Instructions:  - The bandage is to remain in place for 24 hours. Keep the bandage clean and dry. Do not submerge the site for 72 hours (no tub, baths or pools).  - After 24 hours you must remove the bandage. Wash the procedure site gently with soap and water. If you choose to wear a bandaid for a few days, make sure it remains clean and dry and that it is changed daily.  - The day after the procedure you may shower after you remove your dressing (but not baths).    MONITOR YOUR GROIN SITE FOR ANY BLEEDING OR SWELLING. IF EITHER OCCUR, LAY FLAT AND HAVE SOMEONE HOLD PRESSURE OVER THE SITE FOR 20 MINUTES. IF UNABLE TO STOP, CALL 911    When you should NOTIFY YOUR PHYSICIAN:  - If you have shortness of breath or a persistent cough  - If you have chest pain (angina)  - If you have persistent pain at the procedure site  - If  you experience a fever with a temperature >101 degrees, chills, infection (redness, swelling, thick yellow drainage, or a foul odor from procedure site)    Other:  - You may resume your present diet, unless otherwise directed by your physician  - You may resume all of your medications as prescribed, unless otherwise directed by your physician. A list of your medications was provided to you at discharge.  - Please call your physician's office for a follow-up appointment. You should be seen in 2-4 weeks.  - Do not make any personal/business decisions and/or sign any legal documents for the next 24 hours.

## 2025-03-05 NOTE — PROGRESS NOTES
Pt s/p ablation via R fem vein with vascade closure. Site remained CDI throughout recovery period including after voiding and ambulating in hallway. VSS. Denied pain. Tolerated PO intake. IV's d/c'd. Discharge instructions given, including new prescription for Protonix, pt verbalized understanding. Pt to lobby via WC and wife to drive home.

## 2025-03-05 NOTE — ANESTHESIA PREPROCEDURE EVALUATION
PRE-OP EVALUATION    Patient Name: Enoch Walker    Admit Diagnosis: A-fib (HCC) [I48.91]    Pre-op Diagnosis: * No surgery found *      Anesthesia Procedure: CATH EP    * Surgery not found *    Pre-op vitals reviewed.        Body mass index is 24.37 kg/m².    Current medications reviewed.  Hospital Medications:  No current facility-administered medications on file as of 3/5/2025.       Outpatient Medications:   Prescriptions Prior to Admission[1]    Allergies: Demerol hcl [meperidine] and Penicillins      Anesthesia Evaluation    Patient summary reviewed.    Anesthetic Complications           GI/Hepatic/Renal                                 Cardiovascular  Comment: HCM    Echo 10/2024  1. Left ventricle: The cavity size was below normal. Wall thickness was      mildly to moderately increased with assymetric hypertrophy of the septum.      The appearance was consistent with hypertrophic cardiomyopathy. Systolic      function was hyperdynamic. The estimated ejection fraction was 75-80%, by      visual assessment. Unable to assess LV diastolic function due to heart      rhythm.   2. Aortic root: The aortic root was 3.9cm diameter.   3. Left atrium: The left atrial volume was normal.   4. Right ventricle: The cavity size was normal. Systolic function was      hyperdynamic.                   (+) hypertension         (+) CABG/stent         (+) dysrhythmias (atrial tach) and atrial fibrillation                  Endo/Other    Negative endo/other ROS.                              Pulmonary        (+) COPD                   Neuro/Psych    Negative neuro/psych ROS.                                  Past Surgical History:   Procedure Laterality Date    Cataract      B    Cath ablation  03/2018    For A. Fib/flutter    Cath angio  11/19/2024    left circumflex status post drug-eluting stent    Colonoscopy      Li report 3/2010--tics and polyps    Colonoscopy N/A 01/25/2017    Procedure: COLONOSCOPY, POSSIBLE BIOPSY,  POSSIBLE POLYPECTOMY 98783;  Surgeon: Lalito Benton DO;  Location: [facility] intracard cath ablation arrhythmia  01/2019    Dr. Denis Hurt, [facility]    Tonsillectomy       Social History     Socioeconomic History    Marital status:    Tobacco Use    Smoking status: Every Day     Current packs/day: 1.00     Average packs/day: 1 pack/day for 30.0 years (30.0 ttl pk-yrs)     Types: Cigarettes     Passive exposure: Never    Smokeless tobacco: Never   Vaping Use    Vaping status: Never Used   Substance and Sexual Activity    Alcohol use: Yes     Alcohol/week: 3.0 standard drinks of alcohol     Types: 3 Cans of beer per week     Comment: 3-4 beers daily    Drug use: No     History   Drug Use No     Available pre-op labs reviewed.  Lab Results   Component Value Date    WBC 7.5 02/26/2025    RBC 4.11 02/26/2025    HGB 13.4 02/26/2025    HCT 40.2 02/26/2025    MCV 97.8 02/26/2025    MCH 32.6 02/26/2025    MCHC 33.3 02/26/2025    RDW 15.1 02/26/2025    .0 02/26/2025     Lab Results   Component Value Date     02/26/2025    K 4.2 02/26/2025     02/26/2025    CO2 25.0 02/26/2025    BUN 15 02/26/2025    CREATSERUM 1.48 (H) 02/26/2025     (H) 02/26/2025    CA 9.5 02/26/2025            Airway      Mallampati: II  Mouth opening: >3 FB  TM distance: > 6 cm  Neck ROM: full Cardiovascular    Cardiovascular exam normal.         Dental             Pulmonary    Pulmonary exam normal.                 Other findings              ASA: 3   Plan: general  NPO status verified and     Post-procedure pain management plan discussed with surgeon and patient.    Comment:    I explained intrinsic risks of general anesthesia, including nausea, dental damage, sore throat, mouth injury,and hoarseness from airway management. Also discussed need for arterial line access and associated risks/benefits.   All questions were answered and understanding was demonstrated of risks.  Informed permission was  obtained to proceed as documented in the signed consent form.      Plan/risks discussed with: patient      Consented to blood products.          Present on Admission:  **None**             [1] (Not in a hospital admission)

## 2025-03-05 NOTE — ANESTHESIA PROCEDURE NOTES
Arterial Line    Date/Time: 3/5/2025 7:29 AM    Performed by: Umair Pérez DO  Authorized by: Umair Pérez DO    General Information and Staff    Procedure Start:  3/5/2025 7:29 AM  Procedure End:  3/5/2025 7:30 AM  Anesthesiologist:  Umair Pérez DO  Performed By:  Anesthesiologist  Patient Location:  OR  Indication: continuous blood pressure monitoring and blood sampling needed    Site Identification: real time ultrasound guided and image stored and retrievable    Preanesthetic Checklist: 2 patient identifiers, IV checked, risks and benefits discussed, monitors and equipment checked, pre-op evaluation, timeout performed, anesthesia consent and sterile technique used    Procedure Details    Catheter Size:  20 G  Catheter Length:  1 and 3/4 inch  Catheter Type:  Arrow  Seldinger Technique?: Yes    Laterality:  Left  Site:  Radial artery  Site Prep: chlorhexidine    Line Secured:  Tape and Tegaderm    Assessment    Events: patient tolerated procedure well with no complications      Medications  3/5/2025 7:29 AM      Additional Comments

## 2025-03-05 NOTE — ANESTHESIA POSTPROCEDURE EVALUATION
[facility]    Enoch Walker Patient Status:  Outpatient   Age/Gender 79 year old male MRN [id number]   Location [facility] INTERVENTIONAL SUITES Attending Amber Lynch MD   Hosp Day # 0 PCP Joseph Powell MD       Anesthesia Post-op Note        Procedure Summary       Date: 03/05/25 Room / Location: [facility] Interventional Suites    Anesthesia Start: 0715 Anesthesia Stop: 1043    Procedure: CATH EP Diagnosis:       A-fib (HCC)      A-fib (HCC)    Scheduled Providers: Umair Pérez DO Anesthesiologist: Umair Pérez DO    Anesthesia Type: general ASA Status: 3            Anesthesia Type: general    Vitals Value Taken Time   /80 03/05/25 1043   Temp 97 03/05/25 1043   Pulse 81 03/05/25 1043   Resp 16 03/05/25 1043   SpO2 100 03/05/25 1043       REDO AFIB RFA/VOM/ANES    Patient Location: PACU    Anesthesia Type: general    Airway Patency: patent    Postop Pain Control: adequate    Mental Status: mildly sedated but able to meaningfully participate in the post-anesthesia evaluation    Nausea/Vomiting: none    Cardiopulmonary/Hydration status: stable euvolemic    Complications: no apparent anesthesia related complications    Postop vital signs: stable    Dental Exam: Unchanged from Preop    Patient to be discharged from PACU when criteria met.

## 2025-03-05 NOTE — ANESTHESIA PROCEDURE NOTES
Airway  Date/Time: 3/5/2025 7:26 AM  Urgency: elective      General Information and Staff    Patient location during procedure: OR  Anesthesiologist: Umair Pérez DO  Performed: anesthesiologist   Performed by: Umair Pérez DO  Authorized by: Umair Pérez DO      Indications and Patient Condition  Indications for airway management: anesthesia  Sedation level: deep  Preoxygenated: yes  Patient position: sniffing  Mask difficulty assessment: 1 - vent by mask    Final Airway Details  Final airway type: endotracheal airway      Successful airway: ETT  Cuffed: yes   Successful intubation technique: direct laryngoscopy  Endotracheal tube insertion site: oral  Blade: Giovani  Blade size: #3  ETT size (mm): 7.5    Placement verified by: capnometry   Measured from: lips  ETT to lips (cm): 23  Number of attempts at approach: 1    Additional Comments  Oral mucosa remains in preoperative state following airway instrumentation.

## 2025-03-05 NOTE — PROCEDURES
Procedure Note    Enoch Walker Location: Cath Lab   CSN [id number] MRN [id number]   Admission Date 3/5/2025  Operation Date 03/05/25    Attending Physician Amber Lynch MD Operating Physician Amber Lynch MD     Pre-Operative Diagnosis: Atrial fibrillation    Post-Operative Diagnosis: Same as above    Procedure Performed: EP & ABLATE SUPRAVENT ARRHYTHMIA/Atrial Fibrillation  1. Comprehensive electrophysiology study.   2. Coronary sinus catheter placement to record left atrial electrograms and pace the left atrium. .    3. Three-dimensional intracardiac mapping.   4. Intracardiac echocardiography (ICE).   5. Transseptal catheterization.   6. Radiofrequency Pulmonary Vein Isolation ablation for atrial fibrillation (15935)   7. Venous closure with Vascade  8. Secondary Arrhythmia (Distinct mechanism- (20553)-   -Mitral annular flutter-mitral line  9. Secondary lines/Ablation for Atrial Fibrillation(09013):   -#1-superior vena cava isolation  2.-Vein of Adan ablation  10. DCCV-none    Indication: Symptomatic paroxysmal atrial fibrillation.     EBL: Minimal    Summary of Case: The patient was brought to the EP lab in a fasting and   nonsedated state after providing informed consent. . The right and left groins were prepped   and draped in a sterile fashion.  Ultrasound-guided access was obtained.. Catheters were placed in the appropriate   Positions (HRA, CS, RV, HB) under ICE and 3D mapping guidance.   Sheaths:  8 Kosovan- /upsized to a Vizigo for transseptal and mapping/ablation  10 fr: - ICE for transseptal puncture, monitoring lesion formation and assessing for complications  7 fr: Decapolar to the Coronary sinus    An intracardiac echo catheter was placed in the mid right atrium and the  interatrial septum was clearly visualized. A [employer]igo sheath was then advanced   in the right femoral vein over a long guidewire to the SVC-RA junction.  The sheath, dilator  and needle were withdrawn in the 5-6  oclock position until tenting was clearly   visualized within the interatrial septum. The RF wire was advanced   through the fossa ovalis, this was confirmed  by ICE. The dilator   and sheath were gently advanced over the needle followed by removal of the  dilator and needle. A circular tip wire was advanced to the left atrium and the Vizigo  advanced over the wire and then withdrawn to the right atrium.     Heparin was bolused after femoral vein access and additional doses given to achieve an ACT of 300 -350 s.    3-D Mapping:  A three-dimensional electroanatomic map was made using aOctarraymultielectrode catheter.  From this mapping we found that the prior ablations were checked with the Optiray and the veins remained isolated as well as the posterior wall.  The anterior mitral line had a small gap near the right superior pulmonary vein.  The cavotricuspid isthmus was still block.  The superior vena cava was not isolated    We initially gained access into the coronary sinus and did a venogram.  Utilizing a 014 wire we occluded the vein and injected approximately 7 to 8 cc of ethanol with a 5-minute dwell time.  We then performed lesions in the coronary sinus adjacent to the area that we would be ablating on the mitral isthmus on the left atrial side.  We did this at 25 W with the vector pointing towards the left atrial side    We then did a mitral line from the mitral valve towards the left inferior pulmonary vein.  There seem to be epicardial connections near the base and the anterior base of the left atrial appendage.  We ablated these and demonstrated bidirectional block.    These lesions were at 40 W.  We also consolidated the anterior mitral line and did a full voltage map showing a good anterior line as well as the vein of Adan/mitral line    We then turned our attention to the superior vena cava isolation.  Paralytics have been worn off and we mapped the entire length and width of the phrenic nerve.  We  performed a crescent lesion around the SVC from the septal side to the posterior lateral side avoiding the area of the phrenic nerve.  We isolated without having to perform a full circumferential ablation.  We tested the phrenic nerve after the SVC isolation and it remained intact    RF- Ablation:  We utilized a high power short duration strategy with 50 W lesions (QDOT).  In the anterior wall we used an index of:500-550.  On the posterior wall we moved the catheter to avoid stacking lesions and used 90 watt lesions. Esophageal cooling was used.  25 W in the coronary sinus.  40 W on the mitral annulus, anterior mitral valve as well as the SVC    All 4 pulmonary veins were isolated as demonstrated by entrance and exit block. This was confirmed by either differential pacing with the coronary sinus catheter (placed in the CS to record electrograms and pace the left atrium) and the circular pulmonary vein catheter. Catheter positions and cardiac anatomy was visualized by 3D mapping.     Protamine was given after a test dose, the sheaths were pulled and hemostasis was maintained with Vascade    ELECTROPHYSIOLOGY STUDY FINDINGS:                   CONCLUSIONS:   1. Sinus node function normal.   2. Atrioventricular node function normal.   3. His-Purkinje function normal.   4. Pulmonary vein isolation was achieved with radiofrequency.    5. There was no pericardial effusion observed at the conclusion of   the procedure, confirmed by intracardiac echocardiography.  6.  Additional arrhythmias:-Mitral annular flutter-anterior mitral line  - Atypical flutter-lateral mitral line-  7.  Additional lesions for AF: #1-superior vena cava isolation  2.  Vein of Adan ablation  8. Venous Closure-Vascade    Complications:  None      Plan:    1) Continue oral anticoagulation , uninterrupted.  2) Continue all medications unchanged  3) Bedrest for 2 hours  4) Pantoprazole 40 mg daily ( new prescription) - 1 month only  5) Follow up in 1  month with me  6) No TTE needed before discharge.   7.  Would consider left atrial appendage occlusion in the future given that he had a prior anterior mitral line as well as the lateral mitral line.              Amber Lynch MD    Cardiac Electrophysiololgy  [facility] [place]  3/5/2025

## 2025-03-05 NOTE — ADDENDUM NOTE
Addendum  created 03/05/25 1131 by Umair Pérez DO    Clinical Note Signed, Intraprocedure Blocks edited, LDA updated via procedure documentation, SmartForm saved

## 2025-03-05 NOTE — ADDENDUM NOTE
Addendum  created 03/05/25 1129 by Umair Pérez,     Child order released for a procedure order, Clinical Note Signed, Intraprocedure Blocks edited, LDA created via procedure documentation, LDA updated via procedure documentation, SmartForm saved

## 2025-04-10 ENCOUNTER — TELEPHONE (OUTPATIENT)
Dept: CARDIOLOGY CLINIC | Facility: HOSPITAL | Age: 80
End: 2025-04-10

## 2025-04-14 ENCOUNTER — TELEPHONE (OUTPATIENT)
Dept: CARDIOLOGY CLINIC | Facility: HOSPITAL | Age: 80
End: 2025-04-14

## 2025-04-14 NOTE — TELEPHONE ENCOUNTER
Discussed Watchman process/procedure. Implant date of 6/20 given. Pre CT complete. Will have blood work 1-2 weeks prior to procedure at [facility] facility. Res form sent to [facility]

## 2025-04-22 ENCOUNTER — TELEPHONE (OUTPATIENT)
Dept: CARDIOLOGY CLINIC | Facility: HOSPITAL | Age: 80
End: 2025-04-22

## 2025-04-22 DIAGNOSIS — I48.91 A-FIB (HCC): Primary | ICD-10-CM

## 2025-04-22 NOTE — TELEPHONE ENCOUNTER
Pre-Procedural Instructions for  Left Atrial Appendage Occlusion Implant (Watchman/Amulet)  Your Watchman/Amulet Procedure is scheduled for: __June 20th________.   Nothing to eat or drink for 8 hours prior to procedure due to having Anesthesia.   You will receive a phone call the day before your procedure with your arrival time. Please disregard the arrival time given in My Chart.   Check in at the [facility] Desk on the Ground Floor of the [facility]  A RN from the Pre-Admission Testing Department will call you to review your medication and history.  You will be given instructions on what medications you can take the morning of your procedure. Below is a list of certain medications that may need to be held prior to that phone call, please review to see if any apply to you.   Diabetic medication:   If you have a continuous glucose monitor (CGM): You will need to remove the device/transmitter prior to the procedure. Please plan accordingly for when you need to exchange the device around the time of your procedure.   GLP-1 Agonists: Such as but not limited to   Saxenda, Ozempic, Trulicity, Byetta, Victoza, Mounjaro  If taken daily hold day of procedure   If taken weekly hold 1 week prior to procedure   SGLT2 Inhibitor Hypoglycemics: Such as but not limited to     Canagliflozin, Canagliflozin/Metformin, Dapaglifolzin, Empagliflozin, Ertugliflozin  Will need to hold 4 days prior to procedure. Be sure to carefully monitor blood sugar while holding, contact your prescribing MD with any abnormal blood sugars.   Blood pressure medications (ACE's/ARB's): Such as but not limited to  Lisinopril, Ramipril, Captopril, Enalapril, Losartan, Diovan, Olmesaratan, Entresto:   Hold 24 hours prior to procedure     Anticoagulation (Blood Thinners):  ___x___ Eliquis, Xarelto, Pradaxa.  Last dose will be: _6/19 am dose ________  (Hold for 24 hours prior to procedure)  ______ Coumadin (Warfarin).  Last dose will be:  ___________________  (Hold for 3 days prior to procedure)    Prior to your Watchman implant you will need one of two tests to determine the size of the Left Atrial Appendage and to check for blood clots:   X CTA of Pulmonary Vein: Completed   This test will be repeated 45 days after your implant. This is done to check the seal around the device and be sure there are no blood clots. For CT scans, central scheduling will call you to schedule. For RIMA's a date will be provided for the follow up.   You will stop taking your anticoagulant (Eliquis, Xarelto, Pradaxa, or Warfarin) unless otherwise directed  You will take 81mg Aspirin and 75mg Plavix for 6 months after your implant. You will receive a call when you can stop the Plavix.     You will need Pre-Admission testing prior to your implant.  Please have these tests done 1-2 weeks prior to the procedure at an [facility] facility:  Schedule an appointment: [facility] Central Scheduling [phone]  Blood work: CBC, BMP, Type&Screen   Please notify RN if you had a recent blood transfusion within 90 days (then blood test will need to be within 3 days of the procedure)   EKG    Please call us if you have a fever, cough, cold, or any sign of infection prior to the procedure.  Please contact us if you have any questions or concerns:    Sincerely,  [facility] RN's   [phone]

## 2025-06-05 ENCOUNTER — HOSPITAL ENCOUNTER (OUTPATIENT)
Dept: CT IMAGING | Facility: HOSPITAL | Age: 80
Discharge: HOME | End: 2025-06-05
Attending: INTERNAL MEDICINE
Payer: MEDICARE

## 2025-06-05 DIAGNOSIS — I48.91 A-FIB (HCC): Primary | ICD-10-CM

## 2025-06-05 DIAGNOSIS — I48.91 A-FIB (HCC): ICD-10-CM

## 2025-06-06 ENCOUNTER — TELEPHONE (OUTPATIENT)
Dept: FAMILY MEDICINE CLINIC | Facility: CLINIC | Age: 80
End: 2025-06-06

## 2025-06-06 NOTE — TELEPHONE ENCOUNTER
Patient and spouse called first thing this morning    Patient was scheduled for an appointment and checked in yesterday for a CT at [facility] by a Amber Davey, ABRAN    I tried sending a WebX message and bubble message to noted RN with no response.     I tried calling central scheduling and they are unable to help    I also left a voice message first thing this morning and then again right now at the [facility]  [phone]    Patient was at home the entire time. The only reason they know about this is from the [employer] notification.   A $50 charge was also assessed.    Charting for follow up purposes

## 2025-06-09 ENCOUNTER — TELEPHONE (OUTPATIENT)
Dept: FAMILY MEDICINE CLINIC | Facility: CLINIC | Age: 80
End: 2025-06-09

## 2025-06-09 NOTE — TELEPHONE ENCOUNTER
Spoke with spouse again to let her know that either Amber Davey or Ritika Chavis should be following up on this per Epic \"bubble\" message received

## 2025-06-09 NOTE — TELEPHONE ENCOUNTER
Spouse states pt has too many appts right now and she will not be able to get him to make an annual wellness exam right now.

## 2025-06-10 NOTE — DISCHARGE INSTRUCTIONS
Home Care Instructions following  Left Atrial Appendage Closure (LAAC) Device    These discharge instructions are provided for you as a guide until you are seen for a follow-up appointment with your cardiologist.  You may shower the day after the procedure.  Remove the band aid if there is still one applied to groin site.   No bath, hot tubs, or swimming for 72 hours.  Your groin will generally have one access point.  Some minor bruising is common at each site with minor soreness.  If larger swelling or more significant pain occurs at the area, please contact your physician's office.   In the first few days after the procedure, you should take it easy.  No heavy lifting (no more than 10 pounds) or heavy exertion.   You will take Plavix 75mg and aspirin 81 mg for 6 months after the implant. You will stop taking your anticoagulant (Eliquis, Xarelto, Pradaxa, or Warfarin) unless otherwise directed. You will be notified at the 6 month rogelio regarding further medication instructions.   Your follow-up appointment with your cardiologist has been scheduled.  Please see your hospital discharge instructions. Call the physician's office if you need to reschedule.    You will need a CT scan or RIMA 45 days after your procedure to check the seal and verify there are no clots.   CT- Central scheduling will contact you to schedule  You may resume your present diet, unless otherwise specified by your physician.  You may resume all of your medications as prescribed, unless otherwise directed by your physician.  A list of your medications will be provided to you at discharge.  You should call your physician if you experience any of the following:  If you have chest pain (angina).  If you have persistent pain at the procedure site.  If you experience signs of infection: a fever with temperature greater than 101°F; chills; or any redness, swelling, thick yellow drainage, or foul odor coming from the procedure site  General feeling of  being unwell    You should go to the Emergency Room at the hospital if you experience any of the following:   Increasing chest pain  Rapid heartbeats  Shortness of breath  High fever  Any symptom such as numbness, tingling, dizziness or double vision

## 2025-06-11 ENCOUNTER — LAB ENCOUNTER (OUTPATIENT)
Dept: LAB | Age: 80
End: 2025-06-11
Attending: FAMILY MEDICINE
Payer: MEDICARE

## 2025-06-11 ENCOUNTER — EKG ENCOUNTER (OUTPATIENT)
Dept: LAB | Age: 80
End: 2025-06-11
Attending: FAMILY MEDICINE
Payer: MEDICARE

## 2025-06-11 DIAGNOSIS — I48.91 A-FIB (HCC): ICD-10-CM

## 2025-06-11 LAB
ANION GAP SERPL CALC-SCNC: 8 MMOL/L (ref 0–18)
ANTIBODY SCREEN: NEGATIVE
ATRIAL RATE: 78 BPM
BUN BLD-MCNC: 15 MG/DL (ref 9–23)
CALCIUM BLD-MCNC: 9.3 MG/DL (ref 8.7–10.6)
CHLORIDE SERPL-SCNC: 107 MMOL/L (ref 98–112)
CO2 SERPL-SCNC: 25 MMOL/L (ref 21–32)
CREAT BLD-MCNC: 1.28 MG/DL (ref 0.7–1.3)
EGFRCR SERPLBLD CKD-EPI 2021: 58 ML/MIN/1.73M2 (ref 60–?)
ERYTHROCYTE [DISTWIDTH] IN BLOOD BY AUTOMATED COUNT: 13.5 %
FASTING STATUS PATIENT QL REPORTED: NO
GLUCOSE BLD-MCNC: 93 MG/DL (ref 70–99)
HCT VFR BLD AUTO: 41.3 % (ref 39–53)
HGB BLD-MCNC: 14.1 G/DL (ref 13–17.5)
MCH RBC QN AUTO: 32.5 PG (ref 26–34)
MCHC RBC AUTO-ENTMCNC: 34.1 G/DL (ref 31–37)
MCV RBC AUTO: 95.2 FL (ref 80–100)
OSMOLALITY SERPL CALC.SUM OF ELEC: 291 MOSM/KG (ref 275–295)
P AXIS: 88 DEGREES
P-R INTERVAL: 152 MS
PLATELET # BLD AUTO: 241 10(3)UL (ref 150–450)
POTASSIUM SERPL-SCNC: 4.8 MMOL/L (ref 3.5–5.1)
Q-T INTERVAL: 440 MS
QRS DURATION: 110 MS
QTC CALCULATION (BEZET): 501 MS
R AXIS: 62 DEGREES
RBC # BLD AUTO: 4.34 X10(6)UL (ref 3.8–5.8)
RH BLOOD TYPE: POSITIVE
SODIUM SERPL-SCNC: 140 MMOL/L (ref 136–145)
T AXIS: 63 DEGREES
VENTRICULAR RATE: 78 BPM
WBC # BLD AUTO: 8.7 X10(3) UL (ref 4–11)

## 2025-06-11 PROCEDURE — 93005 ELECTROCARDIOGRAM TRACING: CPT

## 2025-06-11 PROCEDURE — 36415 COLL VENOUS BLD VENIPUNCTURE: CPT

## 2025-06-11 PROCEDURE — 93010 ELECTROCARDIOGRAM REPORT: CPT | Performed by: INTERNAL MEDICINE

## 2025-06-11 PROCEDURE — 80048 BASIC METABOLIC PNL TOTAL CA: CPT

## 2025-06-11 PROCEDURE — 85027 COMPLETE CBC AUTOMATED: CPT

## 2025-06-17 NOTE — PAT NURSING NOTE
Per PAT encounter/SeeToohart message sent to pt:    PreOp Instructions     You are scheduled for: a Cardiac Procedure     Date of Procedure: 06/20/25 Friday     Diet Instructions: Do not eat or drink anything after midnight including gum, mints, candy, etc.     Medications: Medications you are allowed to take can be taken with a sip of water the morning of your procedure     Do not take the following Blood Thinner(s): Last dose of Xarelto will be Thursday morning 6/19; Do NOT take your dose Friday morning 6/20.     Medications to Stop: Hold herbal supplements and vitamins starting now.     Skin Prep : Shower with antibacterial soap using a clean washcloth, prior to procedure. Once dried off, no lotions/powders/creams/ointments, etc., Do not shave the procedure area, this will be completed at the hospital during the preparation phase.     Arrival Time: The day prior to your procedure (Thursday) you will receive a phone call before 6:00 pm with your arrival time. If you haven't received a phone call, please check your voicemail messages., If you did not receive a voice mail and it is after 6:00 pm, please call the nursing supervisor at [phone].    Driving After Procedure: Sedation will be given so you WILL NOT be able to drive home. You will need a responsible adult  to drive you home. You can NOT take uber or taxi unless approved by your physician in advance.     Discharge Teaching: Your nurse will give you specific instructions before discharge, Most people can resume normal activities in 2-3 days, Any questions, please call the physician's office     Discharge Teaching (Overnight): We recommend you bring an overnight bag that you leave in the car the day of your procedure in case you need to stay overnight. We do not have lockers to lock up belongings so your  would have to hold onto bag while you are in the procedure. Examples would be chargers for electronics you need, toiletries, change of  clothes, etc.      parking is available starting at 6 am or park in the [place] parking garage at [facility]. Check in at the [facility] reception desk. Our  will be there to check you in for your procedure. Please bring your insurance cards and ID with you.                                                                                                                                      Please DO NOT respond to this message, the inbasket is not monitored for messages. For any questions, please call the physician's office.

## 2025-06-20 ENCOUNTER — APPOINTMENT (OUTPATIENT)
Dept: CV DIAGNOSTICS | Facility: HOSPITAL | Age: 80
End: 2025-06-20
Attending: INTERNAL MEDICINE
Payer: MEDICARE

## 2025-06-20 ENCOUNTER — HOSPITAL ENCOUNTER (INPATIENT)
Dept: INTERVENTIONAL RADIOLOGY/VASCULAR | Facility: HOSPITAL | Age: 80
LOS: 1 days | Discharge: HOME | End: 2025-06-20
Attending: INTERNAL MEDICINE | Admitting: INTERNAL MEDICINE
Payer: MEDICARE

## 2025-06-20 ENCOUNTER — ANESTHESIA EVENT (OUTPATIENT)
Dept: INTERVENTIONAL RADIOLOGY/VASCULAR | Facility: HOSPITAL | Age: 80
End: 2025-06-20
Payer: MEDICARE

## 2025-06-20 VITALS
DIASTOLIC BLOOD PRESSURE: 76 MMHG | WEIGHT: 162 LBS | SYSTOLIC BLOOD PRESSURE: 109 MMHG | BODY MASS INDEX: 23.19 KG/M2 | OXYGEN SATURATION: 95 % | HEART RATE: 86 BPM | TEMPERATURE: 97 F | RESPIRATION RATE: 15 BRPM | HEIGHT: 70 IN

## 2025-06-20 DIAGNOSIS — I48.91 A-FIB (HCC): ICD-10-CM

## 2025-06-20 LAB
ISTAT ACTIVATED CLOTTING TIME: 464 SECONDS (ref 74–137)
RH BLOOD TYPE: POSITIVE

## 2025-06-20 PROCEDURE — 85347 COAGULATION TIME ACTIVATED: CPT

## 2025-06-20 PROCEDURE — 93355 ECHO TRANSESOPHAGEAL (TEE): CPT | Performed by: INTERNAL MEDICINE

## 2025-06-20 PROCEDURE — 93312 ECHO TRANSESOPHAGEAL: CPT | Performed by: INTERNAL MEDICINE

## 2025-06-20 PROCEDURE — B245ZZ4 ULTRASONOGRAPHY OF LEFT HEART, TRANSESOPHAGEAL: ICD-10-PCS | Performed by: INTERNAL MEDICINE

## 2025-06-20 PROCEDURE — 33340 PERQ CLSR TCAT L ATR APNDGE: CPT | Performed by: INTERNAL MEDICINE

## 2025-06-20 PROCEDURE — B51VYZZ FLUOROSCOPY OF OTHER VEINS USING OTHER CONTRAST: ICD-10-PCS | Performed by: INTERNAL MEDICINE

## 2025-06-20 PROCEDURE — 02L73DK OCCLUSION OF LEFT ATRIAL APPENDAGE WITH INTRALUMINAL DEVICE, PERCUTANEOUS APPROACH: ICD-10-PCS | Performed by: INTERNAL MEDICINE

## 2025-06-20 RX ORDER — HYDROCODONE BITARTRATE AND ACETAMINOPHEN 5; 325 MG/1; MG/1
2 TABLET ORAL ONCE AS NEEDED
Status: DISCONTINUED | OUTPATIENT
Start: 2025-06-20 | End: 2025-06-20 | Stop reason: HOSPADM

## 2025-06-20 RX ORDER — HEPARIN SODIUM 5000 [USP'U]/ML
INJECTION, SOLUTION INTRAVENOUS; SUBCUTANEOUS
Status: COMPLETED
Start: 2025-06-20 | End: 2025-06-20

## 2025-06-20 RX ORDER — CLOPIDOGREL BISULFATE 75 MG/1
75 TABLET ORAL DAILY
Status: DISCONTINUED | OUTPATIENT
Start: 2025-06-21 | End: 2025-06-20

## 2025-06-20 RX ORDER — HYDROMORPHONE HYDROCHLORIDE 1 MG/ML
0.2 INJECTION, SOLUTION INTRAMUSCULAR; INTRAVENOUS; SUBCUTANEOUS EVERY 5 MIN PRN
Status: DISCONTINUED | OUTPATIENT
Start: 2025-06-20 | End: 2025-06-20 | Stop reason: HOSPADM

## 2025-06-20 RX ORDER — ROCURONIUM BROMIDE 10 MG/ML
INJECTION, SOLUTION INTRAVENOUS AS NEEDED
Status: DISCONTINUED | OUTPATIENT
Start: 2025-06-20 | End: 2025-06-20 | Stop reason: SURG

## 2025-06-20 RX ORDER — IODIXANOL 320 MG/ML
100 INJECTION, SOLUTION INTRAVASCULAR
Status: COMPLETED | OUTPATIENT
Start: 2025-06-20 | End: 2025-06-20

## 2025-06-20 RX ORDER — HYDROMORPHONE HYDROCHLORIDE 1 MG/ML
0.6 INJECTION, SOLUTION INTRAMUSCULAR; INTRAVENOUS; SUBCUTANEOUS EVERY 5 MIN PRN
Status: DISCONTINUED | OUTPATIENT
Start: 2025-06-20 | End: 2025-06-20 | Stop reason: HOSPADM

## 2025-06-20 RX ORDER — ONDANSETRON 2 MG/ML
INJECTION INTRAMUSCULAR; INTRAVENOUS AS NEEDED
Status: DISCONTINUED | OUTPATIENT
Start: 2025-06-20 | End: 2025-06-20 | Stop reason: SURG

## 2025-06-20 RX ORDER — ATORVASTATIN CALCIUM 10 MG/1
10 TABLET, FILM COATED ORAL NIGHTLY
COMMUNITY

## 2025-06-20 RX ORDER — ACETAMINOPHEN AND CODEINE PHOSPHATE 300; 30 MG/1; MG/1
1 TABLET ORAL EVERY 4 HOURS PRN
Status: DISCONTINUED | OUTPATIENT
Start: 2025-06-20 | End: 2025-06-20

## 2025-06-20 RX ORDER — SODIUM CHLORIDE 9 MG/ML
INJECTION, SOLUTION INTRAVENOUS CONTINUOUS
Status: DISCONTINUED | OUTPATIENT
Start: 2025-06-20 | End: 2025-06-20

## 2025-06-20 RX ORDER — SODIUM CHLORIDE, SODIUM LACTATE, POTASSIUM CHLORIDE, CALCIUM CHLORIDE 600; 310; 30; 20 MG/100ML; MG/100ML; MG/100ML; MG/100ML
INJECTION, SOLUTION INTRAVENOUS CONTINUOUS
Status: DISCONTINUED | OUTPATIENT
Start: 2025-06-20 | End: 2025-06-20 | Stop reason: HOSPADM

## 2025-06-20 RX ORDER — ACETAMINOPHEN 500 MG
1000 TABLET ORAL ONCE AS NEEDED
Status: DISCONTINUED | OUTPATIENT
Start: 2025-06-20 | End: 2025-06-20 | Stop reason: HOSPADM

## 2025-06-20 RX ORDER — PROTAMINE SULFATE 10 MG/ML
INJECTION, SOLUTION INTRAVENOUS AS NEEDED
Status: DISCONTINUED | OUTPATIENT
Start: 2025-06-20 | End: 2025-06-20 | Stop reason: SURG

## 2025-06-20 RX ORDER — NALOXONE HYDROCHLORIDE 0.4 MG/ML
0.08 INJECTION, SOLUTION INTRAMUSCULAR; INTRAVENOUS; SUBCUTANEOUS AS NEEDED
Status: DISCONTINUED | OUTPATIENT
Start: 2025-06-20 | End: 2025-06-20 | Stop reason: HOSPADM

## 2025-06-20 RX ORDER — ASPIRIN 81 MG/1
81 TABLET ORAL DAILY
Qty: 30 TABLET | Refills: 0 | Status: SHIPPED | COMMUNITY
Start: 2025-06-21

## 2025-06-20 RX ORDER — ACETAMINOPHEN 325 MG/1
650 TABLET ORAL EVERY 4 HOURS PRN
Status: DISCONTINUED | OUTPATIENT
Start: 2025-06-20 | End: 2025-06-20

## 2025-06-20 RX ORDER — HYDROCODONE BITARTRATE AND ACETAMINOPHEN 5; 325 MG/1; MG/1
1 TABLET ORAL ONCE AS NEEDED
Status: DISCONTINUED | OUTPATIENT
Start: 2025-06-20 | End: 2025-06-20 | Stop reason: HOSPADM

## 2025-06-20 RX ORDER — HYDROMORPHONE HYDROCHLORIDE 1 MG/ML
0.4 INJECTION, SOLUTION INTRAMUSCULAR; INTRAVENOUS; SUBCUTANEOUS EVERY 5 MIN PRN
Status: DISCONTINUED | OUTPATIENT
Start: 2025-06-20 | End: 2025-06-20 | Stop reason: HOSPADM

## 2025-06-20 RX ORDER — LIDOCAINE HYDROCHLORIDE 10 MG/ML
INJECTION, SOLUTION EPIDURAL; INFILTRATION; INTRACAUDAL; PERINEURAL AS NEEDED
Status: DISCONTINUED | OUTPATIENT
Start: 2025-06-20 | End: 2025-06-20 | Stop reason: SURG

## 2025-06-20 RX ORDER — ACETAMINOPHEN AND CODEINE PHOSPHATE 300; 30 MG/1; MG/1
2 TABLET ORAL EVERY 4 HOURS PRN
Status: DISCONTINUED | OUTPATIENT
Start: 2025-06-20 | End: 2025-06-20

## 2025-06-20 RX ORDER — ONDANSETRON 2 MG/ML
4 INJECTION INTRAMUSCULAR; INTRAVENOUS EVERY 6 HOURS PRN
Status: DISCONTINUED | OUTPATIENT
Start: 2025-06-20 | End: 2025-06-20 | Stop reason: HOSPADM

## 2025-06-20 RX ORDER — ASPIRIN 81 MG/1
81 TABLET ORAL DAILY
Status: DISCONTINUED | OUTPATIENT
Start: 2025-06-21 | End: 2025-06-20

## 2025-06-20 RX ORDER — HEPARIN SODIUM 1000 [USP'U]/ML
INJECTION, SOLUTION INTRAVENOUS; SUBCUTANEOUS AS NEEDED
Status: DISCONTINUED | OUTPATIENT
Start: 2025-06-20 | End: 2025-06-20 | Stop reason: SURG

## 2025-06-20 RX ORDER — LIDOCAINE HYDROCHLORIDE 10 MG/ML
INJECTION, SOLUTION EPIDURAL; INFILTRATION; INTRACAUDAL; PERINEURAL
Status: COMPLETED
Start: 2025-06-20 | End: 2025-06-20

## 2025-06-20 RX ADMIN — SODIUM CHLORIDE: 9 INJECTION, SOLUTION INTRAVENOUS at 09:33:00

## 2025-06-20 RX ADMIN — ROCURONIUM BROMIDE 10 MG: 10 INJECTION, SOLUTION INTRAVENOUS at 10:13:00

## 2025-06-20 RX ADMIN — SODIUM CHLORIDE: 9 INJECTION, SOLUTION INTRAVENOUS at 11:01:00

## 2025-06-20 RX ADMIN — LIDOCAINE HYDROCHLORIDE 50 MG: 10 INJECTION, SOLUTION EPIDURAL; INFILTRATION; INTRACAUDAL; PERINEURAL at 09:36:00

## 2025-06-20 RX ADMIN — IODIXANOL 30 ML: 320 INJECTION, SOLUTION INTRAVASCULAR at 10:58:00

## 2025-06-20 RX ADMIN — ROCURONIUM BROMIDE 70 MG: 10 INJECTION, SOLUTION INTRAVENOUS at 09:36:00

## 2025-06-20 RX ADMIN — PROTAMINE SULFATE 50 MG: 10 INJECTION, SOLUTION INTRAVENOUS at 10:46:00

## 2025-06-20 RX ADMIN — ROCURONIUM BROMIDE 10 MG: 10 INJECTION, SOLUTION INTRAVENOUS at 10:39:00

## 2025-06-20 RX ADMIN — HEPARIN SODIUM 10000 UNITS: 1000 INJECTION, SOLUTION INTRAVENOUS; SUBCUTANEOUS at 10:11:00

## 2025-06-20 RX ADMIN — ONDANSETRON 4 MG: 2 INJECTION INTRAMUSCULAR; INTRAVENOUS at 10:46:00

## 2025-06-20 RX ADMIN — HEPARIN SODIUM 5000 UNITS: 1000 INJECTION, SOLUTION INTRAVENOUS; SUBCUTANEOUS at 10:23:00

## 2025-06-20 NOTE — ANESTHESIA POSTPROCEDURE EVALUATION
[facility]    Enoch Walker Patient Status:  Inpatient   Age/Gender 79 year old male MRN [id number]   Location [facility] INTERVENTIONAL SUITES Attending Amber Lynch MD   Hosp Day # 0 PCP Joseph Powell MD       Anesthesia Post-op Note        Procedure Summary       Date: 06/20/25 Room / Location: [facility] Interventional Suites    Anesthesia Start: 0933 Anesthesia Stop: 1106    Procedure: CATH WATCHMAN Diagnosis:       A-fib (HCC)      A-fib (HCC)    Scheduled Providers: Desean Sprague MD Anesthesiologist: Desean Sprague MD    Anesthesia Type: general ASA Status: 3            Anesthesia Type: general    Vitals Value Taken Time   /75 06/20/25 11:07   Temp 98 06/20/25 11:07   Pulse 85 06/20/25 11:07   Resp 19 06/20/25 11:07   SpO2 99 06/20/25 11:07       WATCHMAN    Patient Location: PACU    Anesthesia Type: general    Airway Patency: patent    Postop Pain Control: adequate    Mental Status: mildly sedated but able to meaningfully participate in the post-anesthesia evaluation    Nausea/Vomiting: none    Cardiopulmonary/Hydration status: stable euvolemic    Complications: no apparent anesthesia related complications    Postop vital signs: stable    Dental Exam: Unchanged from Preop    Patient to be discharged from PACU when criteria met.

## 2025-06-20 NOTE — PROCEDURES
PROCEDURE PERFORMED:   1. Watchman Flx PRO device implant.  2. Transseptal catheterization with left atrial pressure recording.  3.  FEOPS 3d CT Pre procedure imaging and planning.      INDICATIONS FOR PROCEDURE: History of Bleeding events/falls OAC. CHADSVasc score 3    OPERATORS:   Amber Lynch MD, Perry Mcguire MD, Car Hawkins MD    DESCRIPTION OF PROCEDURE: The patient was brought to the endovascular suite in a fasting and nonsedated state after providing informed consent. Sedation was administered by the general anesthesia service.     See separate operative report for groin access and transeptal catheterization.     A Watchman access sheath was then advanced over the the safari exchange wire. A pigtail catheter was then inserted over the long guidewire, and the long guidewire was then withdrawn. The 6-Khmer pigtail catheter was then advanced to the distal part of the left atrial appendage. The Watchman delivery sheath was advanced over the pigtail catheter within the left atrial appendage. The pigtail catheter was withdrawn, and the Watchman delivery sheath was aspirated and flushed.   RIMA measurements were obtained to select the Watchman device size.     Based upon measurements in multiple views, a size of 27 mm was selected for device placement. The Watchman delivery sheath was then inserted into the Watchman access sheath. Once the delivery sheath was positioned in the left atrial appendage, the device was delivered as the access sheath were gently withdrawn. Following deployment of the Watchman device, device stability was evaluated with the PASS criteria. PASS criteria measurements were evaluated both fluoroscopically with left atrial appendage angiography along with RIMA imaging. PASS criteria included adequate positioning, stability of the device with a slight tug test to confirm the Watchman struts were adequately anchored in the left atrial appendage, and size of the device was evaluated by  measuring percentage of compression between 8% and 20%. Also, a seal of the device was measured by color flow Doppler, the size of the residual leak was 0.  The device was then released from the delivery sheath, and the delivery sheath and access sheath were gently withdrawn into the right atrium. There was no pericardial effusion visualized. The 14-Belarusian sheath was removed, and the right femoral vein was closed using a Vascade closure device.       CONCLUSIONS:   1. Successful deployment of a 27 mm Watchman Flx PRO device. Deployment was confirmed by demonstrating the appropriate PASS criteria of position, anchor, size, and seal (Color flow leak was 0 mm). This is outlined in the procedure note.  2. No pericardial effusion by RIMA at the conclusion of the procedure.  3. The patient was transported to postanesthesia recovery in stable condition.   4. CHADSVasc score 3.      _______________________________________  Amber Lynch MD  Cardiac Electrophysiololgy  [facility]  6/20/2025

## 2025-06-20 NOTE — ANESTHESIA PROCEDURE NOTES
Airway  Date/Time: 6/20/2025 9:36 AM  Reason: elective    Airway not difficult    General Information and Staff   Patient location during procedure: OR  Anesthesiologist: Desean Sprague MD  Performed: anesthesiologist   Performed by: Desean Sprague MD  Authorized by: Desean Sprague MD        Indications and Patient Condition  Indications for airway management: anesthesia  Sedation level: deep      Preoxygenated: yesPatient position: sniffing    Mask difficulty assessment: 1 - vent by mask    Final Airway Details    Final airway type: endotracheal airway    Successful airway: ETT  Cuffed: yes   Successful intubation technique: Video laryngoscopy  Endotracheal tube insertion site: oral  Blade: GlideScope  Blade size: #3  ETT size (mm): 8.0    Placement verified by: capnometry   Cuff volume (mL): 8  Measured from: lips  ETT to lips (cm): 23  Number of attempts at approach: 1    Additional Comments  atraumatic

## 2025-06-20 NOTE — H&P
[place]  Pre Procedure History and Physical    Enoch Walker Patient Status:  Inpatient    1945 MRN [id number]   [facility] INTERVENTIONAL SUITES Attending Amber Lynch MD   Hosp Day # 0 PCP Joseph Powell MD     Consults      History of Present Illness:  Enoch Walker is a a(n) 79 year old male here for LAAO      Prior H/P Reviewed with Chadsvasc- 3 CAD/Agex2    History:  Past Medical History[1]  Past Surgical History[2]  Family History[3]   reports that he has been smoking cigarettes. He has a 30 pack-year smoking history. He has never been exposed to tobacco smoke. He has never used smokeless tobacco. He reports current alcohol use of about 3.0 standard drinks of alcohol per week. He reports that he does not use drugs.    Allergies:  Allergies[4]    Medications:  Current Hospital Medications[5]    OBJECTIVE      Physical Exam:  Physical Exam   Height 5' 10\" (1.778 m), weight 162 lb (73.5 kg).  No data recorded.    Wt Readings from Last 3 Encounters:   25 162 lb (73.5 kg)   25 165 lb (74.8 kg)   10/31/24 159 lb 9.8 oz (72.4 kg)       NAD  PERRLA/EOMI  Neck veins not elevated  Carotids- no bruits  CTA bilaterally  Cardiac- RRR  Abdomen- Soft,Nontender, normal BS  Extremities- pulses normal  Edema-   Mood /Affect Congruent  Skin- no lesions          Results:   No results for input(s): \"GLU\", \"BUN\", \"CREATSERUM\", \"GFRAA\", \"GFRNAA\", \"EGFRCR\", \"CA\", \"NA\", \"K\", \"CL\", \"CO2\" in the last 168 hours.  No results for input(s): \"RBC\", \"HGB\", \"HCT\", \"MCV\", \"MCH\", \"MCHC\", \"RDW\", \"NEPRELIM\", \"WBC\", \"PLT\" in the last 168 hours.      [unfilled]  No results for input(s): \"BNP\" in the last 168 hours.  Lab Results   Component Value Date    INR 1.21 (H) 10/31/2024     No results found for: \"TROP\"      No results found.       Assessment and Plan    Problem List[6]    Consent: Risks, Benefits and alternatives discussed in clinic. Reverified on the day of the procedure    Procedure  Planned: LAAO- WAtchman    Sedation Plan: GETA    Discharge Plan: same day      Amber Lynch MD  Cardiac Electrophysiology  [facility]  6/20/2025  7:28 AM         [1]   Past Medical History:   Abdominal aortic atherosclerosis    With mild dilation. 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 Fib/flutter    Cath ablation  03/05/2025    EP & ABLATE SUPRAVENT ARRHYTHMIA/Atrial Fibrillation    Cath angio  11/19/2024    left circumflex status post drug-eluting stent    Colonoscopy      Li report 3/2010--tics and polyps    Colonoscopy N/A 01/25/2017    Procedure: COLONOSCOPY, POSSIBLE BIOPSY, POSSIBLE POLYPECTOMY 92041;  Surgeon: Lalito Benton DO;  Location: [facility] intracard cath ablation arrhythmia  01/2019    Dr. Denis Hurt, [facility]    Tonsillectomy     [3] History reviewed. No pertinent family history.  [4]   Allergies  Allergen Reactions    Demerol Hcl [Meperidine] HIVES    Penicillins OTHER (SEE COMMENTS) and UNKNOWN     Other reaction(s): Other - See Comments    Unknown   [5] No current facility-administered medications for this encounter.  [6]   Patient Active Problem List  Diagnosis    Ascending aortic aneurysm    Hypertrophic  cardiomyopathy (HCC)    PAF (paroxysmal atrial fibrillation) (HCC)    Abdominal aortic atherosclerosis    Emphysema lung (HCC)    Atrial fibrillation with rapid ventricular response (HCC)    Wide-complex tachycardia    Myocardial infarction (HCC)

## 2025-06-20 NOTE — ANESTHESIA PROCEDURE NOTES
Arterial Line    Date/Time: 6/20/2025 10:02 AM    Performed by: Desean Sprague MD  Authorized by: Desean Sprague MD    General Information and Staff    Procedure Start:  6/20/2025 10:02 AM  Procedure End:  6/20/2025 10:02 AM  Anesthesiologist:  Desean Sprague MD  Performed By:  Anesthesiologist  Patient Location:  OR  Indication: continuous blood pressure monitoring and blood sampling needed    Site Identification: real time ultrasound guided, surface landmarks and image stored and retrievable    Preanesthetic Checklist: 2 patient identifiers, IV checked, risks and benefits discussed, monitors and equipment checked, pre-op evaluation, timeout performed, anesthesia consent and sterile technique used    Procedure Details    Catheter Size:  20 G  Catheter Length:  1 and 3/4 inch  Catheter Type:  Arrow  Seldinger Technique?: Yes    Laterality:  Left  Site:  Radial artery  Site Prep: chlorhexidine    Line Secured:  Tape and Tegaderm    Assessment    Events: patient tolerated procedure well with no complications      Medications  6/20/2025 10:02 AM      Additional Comments

## 2025-06-20 NOTE — PROCEDURES
PROCEDURE PERFORMED:   1. Watchman FLX PRO device implant.  2. Transseptal catheterization with left atrial pressure recording.  3. FEOPS 3D CT imaging and procedure planning.      INDICATIONS FOR PROCEDURE: History of persistent atrial fibrillation on OAC. CHADSVasc score 3    OPERATORS:  Perry Mcguire MD, Amber Lynch MD, Abhishek Hawkins MD      DESCRIPTION OF PROCEDURE: The patient was brought to the endovascular suite in a fasting and nonsedated state after providing informed consent. Sedation was administered by the general anesthesia service.     The right and left groins were prepped and draped in a sterile fashion. After administering 1% lidocaine for local anesthesia,  a 10-Kyrgyz short sheath was placed in the right femoral vein.  Using a Versacross pigtail, a Watchman Trusteer sheath was advanced to the SVC/RA junction. The wire was withdrawn to the tip of the sheath. The sheath, dilator, and needle were then withdrawn until tenting was clearly visualized in the atrial septum on RIMA. Transseptal access using the Versacross wire at the dilator tip. Prior to transseptal access, a heparin bolus was given. Following transseptal access, a second heparin bolus was given followed by intermittent boluses. The wire was advanced to the left atrium, and the sheath and dilator advanced over the wire to the left atrium. Wire and dilator were removed and the sheath was aspirated and flushed. Left atrial pressure was recorded.     See separate operative report for device deployment.     CONCLUSIONS:   1. Successful deployment of a 27 mm Watchman FLX PRO device. Deployment was confirmed by demonstrating the appropriate PASS criteria of position, anchor, size, and seal (Color flow leak was 0 mm). This is outlined in the procedure note.  2. No pericardial effusion by RIMA at the conclusion of the procedure.  3. The patient was transported to postanesthesia recovery in stable condition.   4. CHADSVasc score  3.      _______________________________________  Perry Mcguire MD  Cardiac Electrophysiololgy  [facility]  6/20/2025

## 2025-06-20 NOTE — PROGRESS NOTES
Pt post watchman insertion from PACU. Pt awake. Vss. Right femoral venous assess site is CDI.     Recovery complete per protocol. Vss. Pt has been sitting up, voided and walked. Right fem site remains soft with no signs of bleeding or hematoma. Discharge instructions reviewed, iv dc'd and pt discharged home with wife driving.

## 2025-06-20 NOTE — ANESTHESIA PREPROCEDURE EVALUATION
PRE-OP EVALUATION    Patient Name: Enoch Walker    Admit Diagnosis: A-fib (HCC) [I48.91]    Pre-op Diagnosis: * No pre-op diagnosis entered *        Anesthesia Procedure: CATH WATCHMAN    * No surgeons found in log *    Pre-op vitals reviewed.  Temp: 97.5 °F (36.4 °C)  Pulse: 76  Resp: 18  BP: 119/75  SpO2: 97 %  Body mass index is 23.24 kg/m².    Current medications reviewed.  Hospital Medications:  Current Medications[1]    Outpatient Medications:   Prescriptions Prior to Admission[2]    Allergies: Demerol hcl [meperidine] and Penicillins      Anesthesia Evaluation    Patient summary reviewed.    Anesthetic Complications  (-) history of anesthetic complications         GI/Hepatic/Renal                (+) liver disease                 Cardiovascular                  (+) hypertension       (+) past MI  (+) CABG/stent                            Endo/Other                                  Pulmonary        (+) COPD                   Neuro/Psych                              HOCM        Past Surgical History[3]  Social Hx on file[4]  History   Drug Use No     Available pre-op labs reviewed.  Lab Results   Component Value Date    WBC 8.7 06/11/2025    RBC 4.34 06/11/2025    HGB 14.1 06/11/2025    HCT 41.3 06/11/2025    MCV 95.2 06/11/2025    MCH 32.5 06/11/2025    MCHC 34.1 06/11/2025    RDW 13.5 06/11/2025    .0 06/11/2025     Lab Results   Component Value Date     06/11/2025    K 4.8 06/11/2025     06/11/2025    CO2 25.0 06/11/2025    BUN 15 06/11/2025    CREATSERUM 1.28 06/11/2025    GLU 93 06/11/2025    CA 9.3 06/11/2025            Airway      Mallampati: III  Mouth opening: 3 FB  TM distance: 4 - 6 cm   Cardiovascular             Dental             Pulmonary                     Other findings              ASA: 3   Plan: general  NPO status verified and     Post-procedure pain management plan discussed with surgeon and patient.    Comment: GETA/LMA discussed in detail.  Risk of complications  discussed including but not limited to sore throat, cough, PONV discussed. Also, discussed risks including dental injury particularly on any weakened, treated or diseased teeth & pt wishes to proceed  All questions answered.    Plan/risks discussed with: patient  Use of blood product(s) discussed with: patient    Consented to blood products.          Present on Admission:  **None**             [1]    iodixanol (VISIPAQUE) 320 MG/ML injection 100 mL  100 mL Injection ONCE PRN    sodium chloride 0.9% infusion   Intravenous Continuous    vancomycin (Vancocin) 1,000 mg in sodium chloride 0.9% 250 mL IVPB-ADDV  15 mg/kg Intravenous 30 Min Pre-Op    [COMPLETED] lidocaine PF (Xylocaine-MPF) 1 % injection        [COMPLETED] heparin (Porcine) 5000 UNIT/ML injection        lidocaine PF (Xylocaine-MPF) 1% injection   Intravenous PRN    propofol (Diprivan) 10 MG/ML injection   Intravenous PRN    rocuronium (Zemuron) 50 mg/5mL injection   Intravenous PRN    ceFAZolin (Ancef) 2g in 10mL IV syringe premix   Intravenous PRN   [2]   Medications Prior to Admission   Medication Sig Dispense Refill Last Dose/Taking    atorvastatin 10 MG Oral Tab Take 1 tablet (10 mg total) by mouth nightly.   6/19/2025    clopidogrel 75 MG Oral Tab Take 1 tablet (75 mg total) by mouth daily. 30 tablet 5 6/20/2025 Morning    XARELTO 20 MG Oral Tab Take 1 tablet (20 mg total) by mouth every morning.   6/19/2025    Multiple Vitamin (MULTIVITAMIN ADULT OR) Take by mouth.   Taking    Metoprolol Succinate ER 25 MG Oral Tablet 24 Hr Take 1 tablet (25 mg total) by mouth in the morning.   6/20/2025 Morning   [3]   Past Surgical History:  Procedure Laterality Date    Cataract      B    Cath ablation  03/2018    For A. Fib/flutter    Cath ablation  03/05/2025    EP & ABLATE SUPRAVENT ARRHYTHMIA/Atrial Fibrillation    Cath angio  11/19/2024    left circumflex status post drug-eluting stent    Colonoscopy      Li report 3/2010--tics and polyps    Colonoscopy N/A  01/25/2017    Procedure: COLONOSCOPY, POSSIBLE BIOPSY, POSSIBLE POLYPECTOMY 76440;  Surgeon: Lalito Benton DO;  Location: [facility] intracard cath ablation arrhythmia  01/2019    Dr. Denis Hurt, [facility]    Tonsillectomy     [4]   Social History  Socioeconomic History    Marital status:    Tobacco Use    Smoking status: Every Day     Current packs/day: 1.00     Average packs/day: 1 pack/day for 30.0 years (30.0 ttl pk-yrs)     Types: Cigarettes     Passive exposure: Never    Smokeless tobacco: Never   Vaping Use    Vaping status: Never Used   Substance and Sexual Activity    Alcohol use: Yes     Alcohol/week: 3.0 standard drinks of alcohol     Types: 3 Cans of beer per week     Comment: 3-4 beers daily    Drug use: No

## 2025-06-20 NOTE — ANESTHESIA PROCEDURE NOTES
Procedure Performed: RIMA       Start Time:  6/20/2025 10:03 AM       End Time:   6/20/2025 10:03 AM    Preanesthesia Checklist:  Patient identified, IV assessed, risks and benefits discussed, monitors and equipment assessed, procedure being performed at surgeon's request and anesthesia consent obtained.    General Procedure Information  Diagnostic Indications for Echo:  assessment of ascending aorta and assessment of surgical repair  Physician Requesting Echo: Car Hawkins MD  Location performed:  OR  Intubated  Bite block placed  Heart visualized  Probe Insertion:  Easy  Probe Type:  Multiplane  Modalities:  2D only, color flow mapping, pulse wave Doppler and continuous wave Doppler        Anesthesia Information  Performed Personally  Anesthesiologist:  Desean Sprague MD      Post                       Summary: See other report  Complications:None

## 2025-06-20 NOTE — PROCEDURES
[facility] CARDIOLOGY  RIMA Procedure Note    Enoch Walker Location:      MRN [id number]   Admission Date 6/20/2025 Operation Date 6/20/2025   Attending Physician Amber Lynch MD Operating Physician Car Hawkins MD     Pre-Operative Diagnosis: Per history and physical, paroxysmal atrial fibrillation.  History of multiple prior atrial fibrillation and atrial flutter ablation procedures.  History of bleeding on systemic anticoagulation.    Post-Operative Diagnosis: Same as above    Procedure Performed: RIMA, intraoperative with RIMA guided transseptal heart catheterization and watchman SERA occluder device placement.    Indications: Preprocedure assessment of cardiac anatomy.  Intraprocedure imaging guidance for transseptal heart catheterization and watchman SERA occluder device placement.  Post implant assessment of anatomy and function.    Procedural comments: Complete Omni plane transesophageal echocardiography with 2 and three-dimensional imaging, Doppler and color-flow mapping was performed in the cardiac catheterization laboratory.  The procedure was done in conjunction with a RIMA guided transseptal heart catheterization and SERA occluder device placement performed by Hortensia Mcguire and Syed.  Images were obtained from the standard windows and are of excellent quality.  The study was done under general endotracheal tube anesthesia, the RIMA probe placed by Dr. Sprague.  The patient tolerated the procedure well, with no major complications.    Findings: The rhythm is normal sinus.  The left atrium is large.  There are no intracardiac masses or clots noted.  The patient's left atrial appendage has complex multilobe broccoli anatomic appearance.  Assessment was performed in the standard imaging views.  Ostial diameters range from 1.8-2.1 cm, with depth of 1.3-2.2 cm.  The intra-atrial septum appears anatomically normal.  No pericardial effusion.    Based on the above RIMA derived measurements, the decision was made to  proceed with transseptal catheterization and attempted placement of a 27 mm Watchman FLX SERA occluder device.  Transseptal puncture was performed in the mid-midportion atrial septum under direct RIMA visualization.  The transseptal wire, dilator and sheath were advanced sequentially through the septum and into the left atrium.  The safari wire was placed in the left atrial appendage.  The true steer delivery catheter was advanced over this and into the ostium of the left atrial appendage.    Through the delivery catheter, the 27 mm device was advanced to the ball position.  Proper positioning in the desired location left atrial appendage was achieved with RIMA guidance.  Once proper positioning was confirmed, the device was deployed.  A single deployment was performed.  The device centered nicely on the ostium of the appendage.  There was a mild limbus side shoulder of less than one third noted.  Following initial interrogation, a tug test was performed.  This confirmed device stability.  Complete interrogation was then performed.  There was no GONZALO device leak.  Device diameters ranged from 2-2.2 cm, consistent with 18-23% compression.  2 and three-dimensional imaging showed excellent positioning at the appendage ostium, with completed occlusion which was confirmed by catheter angiography.    The SERA occluder device was then released.  The delivery catheter was withdrawn back into the right atrium.  There was trivial left-right shunting through the septal puncture site.  No pericardial effusion or other early mechanical complication.  Excellent appearance of the occluder device on final imaging.    Summary of Case: Successful RIMA guided 27 mm Watchman FLX SERA occluder device placement as described above.    Car Hawkins MD  6/20/2025  11:11 AM

## 2025-06-22 LAB
BLOOD TYPE BARCODE: 7300
UNIT VOLUME: 350 ML

## 2025-06-23 ENCOUNTER — PATIENT OUTREACH (OUTPATIENT)
Age: 80
End: 2025-06-23

## 2025-06-23 NOTE — PROGRESS NOTES
Left message on mailbox for patient to call nurse care manager back for transitions of care call.  Nurse care  information included in message.      S/p   1. Watchman FLX PRO device implant.  2. Transseptal catheterization with left atrial pressure recording.  3. FEOPS 3D CT imaging and procedure plannin  Groin sites

## 2025-06-24 ENCOUNTER — PATIENT OUTREACH (OUTPATIENT)
Dept: CASE MANAGEMENT | Age: 80
End: 2025-06-24

## 2025-06-24 NOTE — PROGRESS NOTES
Hospital follow up.    Joseph Powell M.D.  [facility]  [address] [place] [address]  [phone]    Attempt #1:  Left message on voicemail for patient to call transitions specialist back to schedule follow up appointments. Provided Transitions specialist scheduling phone number [phone].

## 2025-06-25 NOTE — PROGRESS NOTES
Hospital follow up.    Joseph Powell M.D.  [facility]  [address] [place] [address]  [phone]    Patient does not wish to follow up.    Confirmed with patient's spouse.    Closing encounter.

## 2025-06-25 NOTE — PROGRESS NOTES
Voicemail received; Patient's wife, Donna returning call  Called patient's wife back    PCP appointment request (discharged 06/20)    Dr Joseph CULLEN [address] [address] [phone]  Patient's wife declined appointment; advised patient doesn't want to see his PCP at this time  Closing encounter

## 2025-06-25 NOTE — PROGRESS NOTES
Physician Clarification    Additional information related to the patient's atrial fibrillation:    Persistent AF    This note is part of the patient's medical record.

## 2025-07-01 DIAGNOSIS — I48.91 A-FIB (HCC): Primary | ICD-10-CM

## 2025-08-04 ENCOUNTER — HOSPITAL ENCOUNTER (OUTPATIENT)
Dept: CT IMAGING | Facility: HOSPITAL | Age: 80
Discharge: HOME | End: 2025-08-04
Attending: INTERNAL MEDICINE

## 2025-08-04 VITALS — HEART RATE: 69 BPM | DIASTOLIC BLOOD PRESSURE: 74 MMHG | SYSTOLIC BLOOD PRESSURE: 115 MMHG

## 2025-08-04 DIAGNOSIS — I48.91 A-FIB (HCC): ICD-10-CM

## 2025-08-04 LAB
CREAT BLD-MCNC: 1.3 MG/DL (ref 0.7–1.3)
EGFRCR SERPLBLD CKD-EPI 2021: 57 ML/MIN/1.73M2 (ref 60–?)

## 2025-08-04 PROCEDURE — 82565 ASSAY OF CREATININE: CPT

## 2025-08-04 PROCEDURE — 75572 CT HRT W/3D IMAGE: CPT | Performed by: INTERNAL MEDICINE

## 2025-08-04 RX ORDER — METOPROLOL TARTRATE 1 MG/ML
5 INJECTION, SOLUTION INTRAVENOUS SEE ADMIN INSTRUCTIONS
Status: DISCONTINUED | OUTPATIENT
Start: 2025-08-04 | End: 2025-08-06

## 2025-08-04 RX ORDER — DILTIAZEM HYDROCHLORIDE 5 MG/ML
5 INJECTION INTRAVENOUS SEE ADMIN INSTRUCTIONS
Status: DISCONTINUED | OUTPATIENT
Start: 2025-08-04 | End: 2025-08-06

## (undated) NOTE — LETTER
[address]  [address]  Consent for Procedure/Sedation  Date: 11/2/24         Time:1510    I hereby authorize  Mendez Gonzalez, my physician and his/her assistants (if applicable), which may include medical students, residents, and/or fellows, to perform the following surgical operation/ procedure and administer such anesthesia as may be determined necessary by my physician:  Operation/Procedure name (s)  Transesophageal Echocardiogram with cardioversion on Enoch Walker   2.   I recognize that during the surgical operation/procedure, unforeseen conditions may necessitate additional or different procedures than those listed above.  I, therefore, further authorize and request that the above-named surgeon, assistants, or designees perform such procedures as are, in their judgment, necessary and desirable.    3.   My surgeon/physician has discussed prior to my surgery the potential benefits, risks and side effects of this procedure; the likelihood of achieving goals; and potential problems that might occur during recuperation.  They also discussed reasonable alternatives to the procedure, including risks, benefits, and side effects related to the alternatives and risks related to not receiving this procedure.  I have had all my questions answered and I acknowledge that no guarantee has been made as to the result that may be obtained.    4.   Should the need arise during my operation/procedure, which includes change of level of care prior to discharge, I also consent to the administration of blood and/or blood products.  Further, I understand that despite careful testing and screening of blood or blood products by collecting agencies, I may still be subject to ill effects as a result of receiving a blood transfusion and/or blood products.  The following are some, but not all, of the potential risks that can occur: fever and allergic reactions, hemolytic reactions, transmission of  diseases such as Hepatitis, AIDS and Cytomegalovirus (CMV) and fluid overload.  In the event that I wish to have an autologous transfusion of my own blood, or a directed donor transfusion, I will discuss this with my physician.  Check only if Refusing Blood or Blood Products  I understand refusal of blood or blood products as deemed necessary by my physician may have serious consequences to my condition to include possible death. I hereby assume responsibility for my refusal and release the hospital, its personnel, and my physicians from any responsibility for the consequences of my refusal.          o  Refuse      5.   I authorize the use of any specimen, organs, tissues, body parts or foreign objects that may be removed from my body during the operation/procedure for diagnosis, research or teaching purposes and their subsequent disposal by hospital authorities.  I also authorize the release of specimen test results and/or written reports to my treating physician on the hospital medical staff or other referring or consulting physicians involved in my care, at the discretion of the Pathologist or my treating physician.    6.   I consent to the photographing or videotaping of the operations or procedures to be performed, including appropriate portions of my body for medical, scientific, or educational purposes, provided my identity is not revealed by the pictures or by descriptive texts accompanying them.  If the procedure has been photographed/videotaped, the surgeon will obtain the original picture, image, videotape or CD.  The hospital will not be responsible for storage, release or maintenance of the picture, image, tape or CD.    7.   I consent to the presence of a  or observers in the operating room as deemed necessary by my physician or their designees.    8.   I recognize that in the event my procedure results in extended X-Ray/fluoroscopy time, I may develop a skin reaction.    9. If I have  a Do Not Attempt Resuscitation (DNAR) order in place, that status will be suspended while in the operating room, procedural suite, and during the recovery period unless otherwise explicitly stated by me (or a person authorized to consent on my behalf). The surgeon or my attending physician will determine when the applicable recovery period ends for purposes of reinstating the DNAR order.  10. Patients having a sterilization procedure: I understand that if the procedure is successful the results will be permanent and it will therefore be impossible for me to inseminate, conceive, or bear children.  I also understand that the procedure is intended to result in sterility, although the result has not been guaranteed.   11. I acknowledge that my physician has explained sedation/analgesia administration to me including the risk and benefits I consent to the administration of sedation/analgesia as may be necessary or desirable in the judgment of my physician.    I CERTIFY THAT I HAVE READ AND FULLY UNDERSTAND THE ABOVE CONSENT TO OPERATION and/or OTHER PROCEDURE.      ____________________________________       _________________________________      ______________________________  Signature of Patient         Signature of Responsible Person        Printed Name of Responsible Person   ____________________________________      _________________________________      ______________________________       Signature of Witness          Relationship to Patient                       Date                                       Time  Patient Name: Enoch COVARRUBIAS Aaron  : 1945    Reviewed: 2024   Printed: 2024  Medical Record #: [id number] Page 1 of 1

## (undated) NOTE — LETTER
[address]  [address]  Consent for Procedure/Sedation  Date: 11/2/24         Time: 4943    I hereby authorize Mendez Gonzalez , my physician and his/her assistants (if applicable), which may include medical students, residents, and/or fellows, to perform the following surgical operation/ procedure and administer such anesthesia as may be determined necessary by my physician:  Operation/Procedure name (s)  Transesophageal Echocardiogram on Enoch Pavonlibby   2.   I recognize that during the surgical operation/procedure, unforeseen conditions may necessitate additional or different procedures than those listed above.  I, therefore, further authorize and request that the above-named surgeon, assistants, or designees perform such procedures as are, in their judgment, necessary and desirable.    3.   My surgeon/physician has discussed prior to my surgery the potential benefits, risks and side effects of this procedure; the likelihood of achieving goals; and potential problems that might occur during recuperation.  They also discussed reasonable alternatives to the procedure, including risks, benefits, and side effects related to the alternatives and risks related to not receiving this procedure.  I have had all my questions answered and I acknowledge that no guarantee has been made as to the result that may be obtained.    4.   Should the need arise during my operation/procedure, which includes change of level of care prior to discharge, I also consent to the administration of blood and/or blood products.  Further, I understand that despite careful testing and screening of blood or blood products by collecting agencies, I may still be subject to ill effects as a result of receiving a blood transfusion and/or blood products.  The following are some, but not all, of the potential risks that can occur: fever and allergic reactions, hemolytic reactions, transmission of diseases such as  Hepatitis, AIDS and Cytomegalovirus (CMV) and fluid overload.  In the event that I wish to have an autologous transfusion of my own blood, or a directed donor transfusion, I will discuss this with my physician.  Check only if Refusing Blood or Blood Products  I understand refusal of blood or blood products as deemed necessary by my physician may have serious consequences to my condition to include possible death. I hereby assume responsibility for my refusal and release the hospital, its personnel, and my physicians from any responsibility for the consequences of my refusal.          o  Refuse      5.   I authorize the use of any specimen, organs, tissues, body parts or foreign objects that may be removed from my body during the operation/procedure for diagnosis, research or teaching purposes and their subsequent disposal by hospital authorities.  I also authorize the release of specimen test results and/or written reports to my treating physician on the hospital medical staff or other referring or consulting physicians involved in my care, at the discretion of the Pathologist or my treating physician.    6.   I consent to the photographing or videotaping of the operations or procedures to be performed, including appropriate portions of my body for medical, scientific, or educational purposes, provided my identity is not revealed by the pictures or by descriptive texts accompanying them.  If the procedure has been photographed/videotaped, the surgeon will obtain the original picture, image, videotape or CD.  The hospital will not be responsible for storage, release or maintenance of the picture, image, tape or CD.    7.   I consent to the presence of a  or observers in the operating room as deemed necessary by my physician or their designees.    8.   I recognize that in the event my procedure results in extended X-Ray/fluoroscopy time, I may develop a skin reaction.    9. If I have a Do Not Attempt  Resuscitation (DNAR) order in place, that status will be suspended while in the operating room, procedural suite, and during the recovery period unless otherwise explicitly stated by me (or a person authorized to consent on my behalf). The surgeon or my attending physician will determine when the applicable recovery period ends for purposes of reinstating the DNAR order.  10. Patients having a sterilization procedure: I understand that if the procedure is successful the results will be permanent and it will therefore be impossible for me to inseminate, conceive, or bear children.  I also understand that the procedure is intended to result in sterility, although the result has not been guaranteed.   11. I acknowledge that my physician has explained sedation/analgesia administration to me including the risk and benefits I consent to the administration of sedation/analgesia as may be necessary or desirable in the judgment of my physician.    I CERTIFY THAT I HAVE READ AND FULLY UNDERSTAND THE ABOVE CONSENT TO OPERATION and/or OTHER PROCEDURE.      ____________________________________       _________________________________      ______________________________  Signature of Patient         Signature of Responsible Person        Printed Name of Responsible Person   ____________________________________      _________________________________      ______________________________       Signature of Witness          Relationship to Patient                       Date                                       Time  Patient Name: Enoch COVARRUBIAS Nikcornell  : 1945    Reviewed: 2024   Printed: 2024  Medical Record #: [id number] Page 1 of 1

## (undated) NOTE — Clinical Note
Pt is coming for hospital follow up on 12/19/18. Spouse stated the pt is doing better since d/c. She stated he has not had any concerns since coming home. Medication list was reviewed.  Spouse stated the pt has a FU appt with the cardiologist. Pt has no con

## (undated) NOTE — MR AVS SNAPSHOT
[address] [place] [id number] [phone]               Thank you for choosing us for your health care visit with Joan Wilcox DO.   We are glad to serve you and happy to provide you with this summary of your v

## (undated) NOTE — Clinical Note
Time, I saw Cody Garcia in the office today. He is at increased risk for the development of colon carcinoma based on his personal and family history. And currently scheduling him for colonoscopy.   Thank you Shan Mendez

## (undated) NOTE — Clinical Note
Ritika Mack saw Foster Daily in the office today. He presents for screening colonoscopy. He is currently scheduled.   Thank you Dwayne Bruce

## (undated) NOTE — LETTER
[address]  [address]  Consent for Procedure/Sedation  Date: 11/03/2024         Time: 3:30 pm    I hereby authorize Dr Campos, my physician and his/her assistants (if applicable), which may include medical students, residents, and/or fellows, to perform the following surgical operation/ procedure and administer such anesthesia as may be determined necessary by my physician:  Operation/Procedure name (s)  Cardiac Catheterization, Left Ventricular Cineangiography, Bilateral Selective Coronary Angiography and/or Right Heart Catheterization; possible Percutaneous Transluminal Coronary Angioplasty, Coronary Atherectomy, Coronary Stent, Intracoronary Thrombolytic therapy, Antiplatelet therapy and/or Intravascular Ultrasound on Enoch Pavonlibby   2.   I recognize that during the surgical operation/procedure, unforeseen conditions may necessitate additional or different procedures than those listed above.  I, therefore, further authorize and request that the above-named surgeon, assistants, or designees perform such procedures as are, in their judgment, necessary and desirable.    3.   My surgeon/physician has discussed prior to my surgery the potential benefits, risks and side effects of this procedure; the likelihood of achieving goals; and potential problems that might occur during recuperation.  They also discussed reasonable alternatives to the procedure, including risks, benefits, and side effects related to the alternatives and risks related to not receiving this procedure.  I have had all my questions answered and I acknowledge that no guarantee has been made as to the result that may be obtained.    4.   Should the need arise during my operation/procedure, which includes change of level of care prior to discharge, I also consent to the administration of blood and/or blood products.  Further, I understand that despite careful testing and screening of blood or blood products  by collecting agencies, I may still be subject to ill effects as a result of receiving a blood transfusion and/or blood products.  The following are some, but not all, of the potential risks that can occur: fever and allergic reactions, hemolytic reactions, transmission of diseases such as Hepatitis, AIDS and Cytomegalovirus (CMV) and fluid overload.  In the event that I wish to have an autologous transfusion of my own blood, or a directed donor transfusion, I will discuss this with my physician.  Check only if Refusing Blood or Blood Products  I understand refusal of blood or blood products as deemed necessary by my physician may have serious consequences to my condition to include possible death. I hereby assume responsibility for my refusal and release the hospital, its personnel, and my physicians from any responsibility for the consequences of my refusal.          o  Refuse      5.   I authorize the use of any specimen, organs, tissues, body parts or foreign objects that may be removed from my body during the operation/procedure for diagnosis, research or teaching purposes and their subsequent disposal by hospital authorities.  I also authorize the release of specimen test results and/or written reports to my treating physician on the hospital medical staff or other referring or consulting physicians involved in my care, at the discretion of the Pathologist or my treating physician.    6.   I consent to the photographing or videotaping of the operations or procedures to be performed, including appropriate portions of my body for medical, scientific, or educational purposes, provided my identity is not revealed by the pictures or by descriptive texts accompanying them.  If the procedure has been photographed/videotaped, the surgeon will obtain the original picture, image, videotape or CD.  The hospital will not be responsible for storage, release or maintenance of the picture, image, tape or CD.    7.   I  consent to the presence of a  or observers in the operating room as deemed necessary by my physician or their designees.    8.   I recognize that in the event my procedure results in extended X-Ray/fluoroscopy time, I may develop a skin reaction.    9. If I have a Do Not Attempt Resuscitation (DNAR) order in place, that status will be suspended while in the operating room, procedural suite, and during the recovery period unless otherwise explicitly stated by me (or a person authorized to consent on my behalf). The surgeon or my attending physician will determine when the applicable recovery period ends for purposes of reinstating the DNAR order.  10. Patients having a sterilization procedure: I understand that if the procedure is successful the results will be permanent and it will therefore be impossible for me to inseminate, conceive, or bear children.  I also understand that the procedure is intended to result in sterility, although the result has not been guaranteed.   11. I acknowledge that my physician has explained sedation/analgesia administration to me including the risk and benefits I consent to the administration of sedation/analgesia as may be necessary or desirable in the judgment of my physician.    I CERTIFY THAT I HAVE READ AND FULLY UNDERSTAND THE ABOVE CONSENT TO OPERATION and/or OTHER PROCEDURE.      ____________________________________       _________________________________      ______________________________  Signature of Patient         Signature of Responsible Person        Printed Name of Responsible Person   ____________________________________      _________________________________      ______________________________       Signature of Witness          Relationship to Patient                       Date                                       Time  Patient Name: Enoch Walker  : 1945    Reviewed: 2024   Printed: November 3, 2024  Medical Record #: [id number]  Page 1 of 1

## (undated) NOTE — LETTER
Date & Time: 11/3/2024, 3:22 PM  Patient: Enoch Walker  Encounter Provider(s):    Toño Noel MD Bavani, Nazli, MD Patel, Maulik, DO       To Whom It May Concern:    Enoch Walker was seen and treated in our department on 10/31/2024. He {Return to school/sport/work:[id number]}.    If you have any questions or concerns, please do not hesitate to call.        _____________________________  Physician/APC Signature